# Patient Record
Sex: FEMALE | Race: WHITE | Employment: OTHER | URBAN - METROPOLITAN AREA
[De-identification: names, ages, dates, MRNs, and addresses within clinical notes are randomized per-mention and may not be internally consistent; named-entity substitution may affect disease eponyms.]

---

## 2022-12-13 ENCOUNTER — OFFICE VISIT (OUTPATIENT)
Dept: INTERNAL MEDICINE CLINIC | Age: 82
End: 2022-12-13
Payer: MEDICARE

## 2022-12-13 VITALS
TEMPERATURE: 97.3 F | RESPIRATION RATE: 24 BRPM | SYSTOLIC BLOOD PRESSURE: 120 MMHG | WEIGHT: 146.4 LBS | HEIGHT: 64 IN | DIASTOLIC BLOOD PRESSURE: 70 MMHG | OXYGEN SATURATION: 99 % | HEART RATE: 87 BPM | BODY MASS INDEX: 25 KG/M2

## 2022-12-13 DIAGNOSIS — E55.9 VITAMIN D DEFICIENCY: ICD-10-CM

## 2022-12-13 DIAGNOSIS — F32.A DEPRESSION, UNSPECIFIED DEPRESSION TYPE: ICD-10-CM

## 2022-12-13 DIAGNOSIS — M20.41 HAMMERTOES OF BOTH FEET: Primary | ICD-10-CM

## 2022-12-13 DIAGNOSIS — R61 EXCESSIVE SWEATING: ICD-10-CM

## 2022-12-13 DIAGNOSIS — M20.42 HAMMERTOES OF BOTH FEET: Primary | ICD-10-CM

## 2022-12-13 DIAGNOSIS — E78.00 HIGH CHOLESTEROL: ICD-10-CM

## 2022-12-13 PROCEDURE — 1123F ACP DISCUSS/DSCN MKR DOCD: CPT | Performed by: INTERNAL MEDICINE

## 2022-12-13 PROCEDURE — 99204 OFFICE O/P NEW MOD 45 MIN: CPT | Performed by: INTERNAL MEDICINE

## 2022-12-13 RX ORDER — ALPRAZOLAM 0.25 MG/1
0.25 TABLET ORAL EVERY OTHER DAY
COMMUNITY

## 2022-12-13 RX ORDER — ATORVASTATIN CALCIUM 20 MG/1
20 TABLET, FILM COATED ORAL DAILY
COMMUNITY

## 2022-12-13 RX ORDER — BUSPIRONE HYDROCHLORIDE 30 MG/1
30 TABLET ORAL 2 TIMES DAILY
COMMUNITY

## 2022-12-13 SDOH — ECONOMIC STABILITY: FOOD INSECURITY: WITHIN THE PAST 12 MONTHS, THE FOOD YOU BOUGHT JUST DIDN'T LAST AND YOU DIDN'T HAVE MONEY TO GET MORE.: NEVER TRUE

## 2022-12-13 SDOH — ECONOMIC STABILITY: FOOD INSECURITY: WITHIN THE PAST 12 MONTHS, YOU WORRIED THAT YOUR FOOD WOULD RUN OUT BEFORE YOU GOT MONEY TO BUY MORE.: NEVER TRUE

## 2022-12-13 ASSESSMENT — PATIENT HEALTH QUESTIONNAIRE - PHQ9
SUM OF ALL RESPONSES TO PHQ QUESTIONS 1-9: 0
2. FEELING DOWN, DEPRESSED OR HOPELESS: 0
1. LITTLE INTEREST OR PLEASURE IN DOING THINGS: 0
SUM OF ALL RESPONSES TO PHQ QUESTIONS 1-9: 0
SUM OF ALL RESPONSES TO PHQ QUESTIONS 1-9: 0
SUM OF ALL RESPONSES TO PHQ9 QUESTIONS 1 & 2: 0
SUM OF ALL RESPONSES TO PHQ QUESTIONS 1-9: 0

## 2022-12-13 ASSESSMENT — ANXIETY QUESTIONNAIRES
7. FEELING AFRAID AS IF SOMETHING AWFUL MIGHT HAPPEN: 0
2. NOT BEING ABLE TO STOP OR CONTROL WORRYING: 0
3. WORRYING TOO MUCH ABOUT DIFFERENT THINGS: 0
IF YOU CHECKED OFF ANY PROBLEMS ON THIS QUESTIONNAIRE, HOW DIFFICULT HAVE THESE PROBLEMS MADE IT FOR YOU TO DO YOUR WORK, TAKE CARE OF THINGS AT HOME, OR GET ALONG WITH OTHER PEOPLE: SOMEWHAT DIFFICULT
4. TROUBLE RELAXING: 0
GAD7 TOTAL SCORE: 0
1. FEELING NERVOUS, ANXIOUS, OR ON EDGE: 0
6. BECOMING EASILY ANNOYED OR IRRITABLE: 0
5. BEING SO RESTLESS THAT IT IS HARD TO SIT STILL: 0

## 2022-12-13 ASSESSMENT — SOCIAL DETERMINANTS OF HEALTH (SDOH): HOW HARD IS IT FOR YOU TO PAY FOR THE VERY BASICS LIKE FOOD, HOUSING, MEDICAL CARE, AND HEATING?: NOT HARD AT ALL

## 2022-12-13 NOTE — PROGRESS NOTES
Noemi Garcia is a 80 y.o. female who presents for   Chief Complaint   Patient presents with    Established New Doctor    Health Maintenance     Covid, dep, shingles, tdap, pneumo, flu, awv    Insect Bite     Patient currently have bed bug bites over her body    and follow up of chronic medical problems. Patient Active Problem List   Diagnosis    Lesion of liver    Weight loss     HPI  Here to reestablish as a new patient and recently moved from a different place and patient wants to check her feet and also complains of excessive sweating    Current Outpatient Medications   Medication Sig Dispense Refill    DULoxetine HCl 60 MG CSDR Take by mouth in the morning and at bedtime Taking 2 tabs at night      atorvastatin (LIPITOR) 20 MG tablet Take 20 mg by mouth daily      busPIRone (BUSPAR) 30 MG tablet Take 30 mg by mouth 2 times daily      ALPRAZolam (XANAX) 0.25 MG tablet Take 0.25 mg by mouth every other day. No current facility-administered medications for this visit.        No Known Allergies    Past Medical History:   Diagnosis Date    Anxiety and depression     Depression     GERD (gastroesophageal reflux disease)     Weight loss        Past Surgical History:   Procedure Laterality Date    COCCYX REMOVAL  1950's    COLONOSCOPY  2011    COLONOSCOPY  4/25/14    ENDOSCOPY, COLON, DIAGNOSTIC      EYE SURGERY      injury to eye    SPINE SURGERY      tailbone removed as teenager    TONSILLECTOMY      UPPER GASTROINTESTINAL ENDOSCOPY  4/25/14       Family History   Problem Relation Age of Onset    Stroke Father      ROS  Constitutional:  Negative for fatigue, loss of appetite and unexpected weight change  HEENT            : Negative for neck stiffness and pain, no congestion or sinus pressure  Eyes                : No visual disturbance or pain  Cardiovascular: No chest pain or palpitations or leg swelling  Respiratory      : Negative for cough, shortness of breath or wheezing  Gastrointestinal: Negative for abdominal pain, constipation or diarrhea and bloating No nausea or vomiting  Genitourinary:     No urgency or frequency, no burning or hematuria  Musculoskeletal: No arthralgias, back pain or myalgias  Skin                  : Negative for rash or erythema  Neurological    : Negative for dizziness, weakness, tremors ,light headedness or syncope  Psychiatric       : Negative for dysphoric mood, sleep disturbances, nervous or anxious, or decreased concentration  All other review of systems was negative    Objective  Physical Examination:    Nursing note reviewed    /70 (Site: Right Upper Arm, Position: Sitting, Cuff Size: Medium Adult)   Pulse 87   Temp 97.3 °F (36.3 °C) (Temporal)   Resp 24   Ht 5' 4\" (1.626 m)   Wt 146 lb 6.4 oz (66.4 kg)   SpO2 99%   BMI 25.13 kg/m²   BP Readings from Last 3 Encounters:   12/13/22 120/70   11/11/15 158/72   09/14/15 148/66         Constitutional:  Jenny Rodriguez is oriented to place, person and time ,appears well-developed and well-nourished  HEENT:  Atraumatic and normocephalic, external ears normal bilaterally, nose normal no oropharyngeal exudate and is clear and moist  Eyes:  EOCM normal; conjunctivae normal; PERRLA bilaterally  Neck:  Normal range of motion, neck supple, no JVD and no thyromegaly  Cardiovascular:  RRR, normal heart sounds and intact distal pulses  Pulmonary:  effort normal and breath sounds normal bilaterally,no wheezes or rales, no respiratory distress  Abdominal:  Soft, non-tender; normal bowel sounds, no masses  Musculoskeletal:  Normal range of motion and no edema or tenderness bilaterally  No lymphadenopathy  Neurological:  alert, oriented, and normal reflexes bilaterally  Skin: warm and dry  Psychiatric:  normal mood and effect; behavior normal.    Labs:   No results found for: LABA1C  Lab Results   Component Value Date    CHOL 233 (H) 07/09/2015     Lab Results   Component Value Date    HDL 69 07/09/2015     No results found for: [facility]  Lab Results   Component Value Date    TRIG 91 07/09/2015     No results found for: [place], [place]  Lab Results   Component Value Date    WBC 8.5 09/12/2015    HGB 13.2 09/12/2015    HCT 39.6 09/12/2015    MCV 93.6 09/12/2015     09/12/2015     Lab Results   Component Value Date    INR 1.0 09/12/2015    PROTIME 10.3 09/12/2015     Lab Results   Component Value Date    GLUCOSE 104 (H) 09/12/2015    CREATININE 0.45 (L) 09/12/2015    BUN 15 09/12/2015     09/12/2015    K 3.9 09/12/2015    CL 99 09/12/2015    CO2 27 09/12/2015     Lab Results   Component Value Date    ALT 12 09/12/2015    AST 15 09/12/2015    ALKPHOS 76 09/12/2015    BILITOT 0.21 (L) 09/12/2015     Lab Results   Component Value Date    LABALBU 4.3 09/12/2015     Lab Results   Component Value Date    TSH 2.76 07/09/2015     Assessment:  1. Hammertoes of both feet    2. Excessive sweating    3. Depression, unspecified depression type    4. Vitamin D deficiency    5. High cholesterol        Plan:  Patient had severe hammertoes and more distorted because and patient states that she has seen 2 different podiatrist did not help her and so patient is referred to a foot ankle specialist to evaluate and possible intervention  As patient had bipolar/depression and had difficulty ambulating patient wants TARP paperwork to be completed and was done  Patient has no recent lab work and patient has not seen her doctor for a more than a year and also complaining of excessive sweating and requesting a thyroid check and she will complete lab work ordered  Patient asking about Xanax and advised her that I do not give any prescriptions for Xanax and patient verbalized understanding and I explained to her that she can see psychiatrist or a different PCP  Review in 6 months           1. Ana Luisa received counseling on the following healthy behaviors: nutrition and exercise    2. Prior labs and health maintenance reviewed.      3.  Discussed use, benefit, and side effects of prescribed medications. Barriers to medication compliance addressed. All her questions were answered. Pt voiced understanding. Giovanni Castro will continue current medications, diet and exercise. No orders of the defined types were placed in this encounter. Completed Refills               Requested Prescriptions      No prescriptions requested or ordered in this encounter     4. Patient given educational materials - see patient instructions    5. Was a self-tracking handout given in paper form or via [facility]? NO    Orders Placed This Encounter   Procedures    Comprehensive Metabolic Panel     Standing Status:   Future     Standing Expiration Date:   12/13/2023    Lipid Panel     Standing Status:   Future     Standing Expiration Date:   12/13/2023     Order Specific Question:   Is Patient Fasting?/# of Hours     Answer:   12    TSH     Standing Status:   Future     Standing Expiration Date:   12/13/2023    Vitamin B12     Standing Status:   Future     Standing Expiration Date:   12/13/2023    CBC     Standing Status:   Future     Standing Expiration Date:   12/13/2023    Magnesium     Standing Status:   Future     Standing Expiration Date:   12/13/2023    Vitamin D 25 Hydroxy     Standing Status:   Future     Standing Expiration Date:   12/13/2023    Maegan Curry MD, Orthopedic Surgery (foot & ankle), [place]     Referral Priority:   Routine     Referral Type:   Eval and Treat     Referral Reason:   Specialty Services Required     Referred to Provider:   Aster Johns MD     Requested Specialty:   Orthopedic Surgery     Number of Visits Requested:   1     Return in about 6 months (around 6/13/2023). Patient voiced understanding and agreed to treatment plan.      Electronically signed by Rebeca Vera MD on 12/13/2022 at 12:26 PM    This note is created with a voice recognition program and while intend to generate a document that accurately reflects the content of the visit, no guarantee can be provided that every mistake has been identified and corrected by editing.

## 2022-12-19 DIAGNOSIS — M79.671 BILATERAL FOOT PAIN: Primary | ICD-10-CM

## 2022-12-19 DIAGNOSIS — M79.672 BILATERAL FOOT PAIN: Primary | ICD-10-CM

## 2022-12-20 ENCOUNTER — HOSPITAL ENCOUNTER (OUTPATIENT)
Dept: PHYSICAL THERAPY | Facility: CLINIC | Age: 82
Setting detail: THERAPIES SERIES
Discharge: HOME | End: 2022-12-20
Payer: MEDICARE

## 2022-12-20 ENCOUNTER — OFFICE VISIT (OUTPATIENT)
Dept: ORTHOPEDIC SURGERY | Age: 82
End: 2022-12-20
Payer: MEDICARE

## 2022-12-20 VITALS — RESPIRATION RATE: 16 BRPM | BODY MASS INDEX: 24.92 KG/M2 | OXYGEN SATURATION: 100 % | HEIGHT: 64 IN | WEIGHT: 146 LBS

## 2022-12-20 DIAGNOSIS — M20.40 HAMMER TOE, ACQUIRED: ICD-10-CM

## 2022-12-20 DIAGNOSIS — M21.862 GASTROCNEMIUS EQUINUS OF LEFT LOWER EXTREMITY: ICD-10-CM

## 2022-12-20 DIAGNOSIS — M20.11 VALGUS DEFORMITY OF BOTH GREAT TOES: Primary | ICD-10-CM

## 2022-12-20 DIAGNOSIS — M20.12 VALGUS DEFORMITY OF BOTH GREAT TOES: Primary | ICD-10-CM

## 2022-12-20 PROCEDURE — 97161 PT EVAL LOW COMPLEX 20 MIN: CPT

## 2022-12-20 PROCEDURE — 99204 OFFICE O/P NEW MOD 45 MIN: CPT | Performed by: ORTHOPAEDIC SURGERY

## 2022-12-20 PROCEDURE — 1123F ACP DISCUSS/DSCN MKR DOCD: CPT | Performed by: ORTHOPAEDIC SURGERY

## 2022-12-20 NOTE — LETTER
[address] [place] [address] and Sports Medicine  [place] [address]  Phone: [phone]  Fax: [phone]    Albino Sesay MD    December 20, 2022     Carolyn Mcghee MD  [place] [address] [address] [facility] [address]    Patient: Salvador Palm   MR Number: [id number]   YOB: 1940   Date of Visit: 12/20/2022       Dear Carolyn Mcghee:    Thank you for referring Salvador Palm to me for evaluation/treatment. Below are the relevant portions of my assessment and plan of care. She has left greater than right foot pain, secondary to bilateral hallux valgus with bilateral second hammertoes, with underlying gastroc equinus contractures. Notably, she has the past medical history as above. We had a discussion today about the likely diagnosis and its natural history, physical exam and imaging findings, as well as various treatment options in detail. Surgically, we discussed a left first MTP joint fusion with bone grafting, second hammertoe surgical correction with PIP fusion, and gastroc recession. We discussed the expected postoperative course, including the relevant weightbearing restrictions/immobilization. Prior to her initial office visit, she reports that she has tried toe sleeves and splints, but reports that her pain has gotten significantly worse with time, and she is having a difficult time walking through the grocery store. At today's visit, after a thorough discussion of surgical nonsurgical treatment options, she wishes to proceed with surgery in the near future. Orders/referrals were placed as below at today's visit. The patient has been ordered DME (crutches/walker/rolling knee scooter), which are medically necessary items postoperatively (to be used in the home to maintain daily living activities such as going to the kitchen, bedroom, and bathroom).  I also ordered physical therapy for the patient to help reinforce/teach the relevant weight bearing precautions, to help allow for safe transfers and early mobilization, as well as effectively utilize DME. Surgical booking paperwork was completed and submitted. All questions were answered and the above plan was agreed upon. The patient will return to clinic for a surgical discussion (left foot first MTP joint fusion, second hammertoe surgical correction with possible PIP joint fusion, possible use of bone allograft/autograft and/or bone substitute, and gastroc recession). If you have questions, please do not hesitate to call me. I look forward to following Ana Luisa along with you.     Sincerely,      Will Rodriguez MD

## 2022-12-20 NOTE — CONSULTS
THE [facility] &  Therapy  [facility] [address]   [place]: [phone]  F: [phone]           Physical Therapy Evaluation    Date:  2022   Patient: Evelyn Wu  : 1940  MRN: [id number]  Physician: Dr Yonatan Escobedo: [employer] (VERIFICATION PENDING)  Medical Diagnosis: Bilat valgus deformity toes  Rehab Codes: M20.11, M20.12  Onset date:    Next Dr's appt. : --      Subjective:   CC/HPI: The patient is an 80year old female who has bilat foot pain that began in  atraumatically. Left foot surgery planned for 23. She does not walk very much, up to half a block to ride the bus. She uses a straight cane for gait. Per MD note on 22    Surgically, we discussed a left first MTP joint fusion with bone grafting, second hammertoe surgical correction with PIP fusion, and gastroc recession. Given the severity of her deformity, rigidity, and her age/activity level, I do not believe that a bunionectomy surgery would serve the patient as well as a first MTP joint fusion. We discussed the expected postoperative course, including the relevant weightbearing restrictions/immobilization. Scheduled DOS 23    PMHx: Past Medical History:    She   Past Medical History in prose (no negatives)    has a past medical history of Anxiety and depression, Depression, GERD (gastroesophageal reflux disease), and Weight loss. Past Surgical History:    She  has a past surgical history that includes Spine surgery; Tonsillectomy; Colonoscopy (); eye surgery; Upper gastrointestinal endoscopy (14); Colonoscopy (14); Endoscopy, colon, diagnostic; and Coccyx removal (s).          Medications:  [x] Refer to full medical record [] None [] Other:  Allergies:       [x] Refer to full medical record [] None [] Other:        Martial Status Lives alone, have two sons in town    Home type Apartment    Stairs from outside 1   Stairs inside 5th floor, uses elevator       Pain present? yes   Location LLE 2nd toe foot    Pain Rating currently 8/10             Objective:    ROM/Strength: WNLS        Assessment:  STG: (to be met in 1 treatments)  Educate patient on proper use of DME   Patient to perform transfers and weight bearing status independent without assistance         Rehab Potential:  [x] Good  [] Fair  [] Poor   Suggested Professional Referral:  [x] No  [] Yes:  Barriers to Goal Achievement[de-identified]  [x] No  [] Yes:  Domestic Concerns:  [x] No  [] Yes:    Pt. Education:  [x] Plans/Goals, Risks/Benefits discussed  [x] Home exercise program    Method of Education: [x] Verbal  [x] Demo  [] Written  Comprehension of Education:  [x] Verbalizes understanding. [x] Demonstrates understanding. Treatment Plan:  [] Therapeutic Exercise      [] Manual Therapy       [] Instruction in HEP        [] Neuromuscular Re-education     [] Vasocompression Dk )        [x] Gait Training                      []  Medication allergies reviewed for use of    Dexamethasone Sodium Phosphate 4mg/ml     with iontophoresis treatments. Pt is not allergic. Frequency: 1 visit      Todays Treatment:      Educated pt on use of DME, including: (Check box of device used)     [x] Knee Scooter: Instructed pt on appropriate height being even with contralateral knee. Reviewed safety concerns such as not gliding on turns and using breaks appropriately. [x] Rolling Walker: Instructed pt on appropriate height of even with wrists with arms at resting at side. Educated pt on safe gait pattern while using walker. Explained to pt the difference between a 4 wheeled walker and rolling walker and how a rolling walker is most appropriate for a NWB pt. Comments: Pt with good understanding of all techniques/uses and expressed no safety concerns.  At this time pt will most benefit from use of knee scooter and rolling walker          Evaluation Complexity:  History (Personal factors, comorbidities) [] 0 [] 1-2 [] 3+   Exam (limitations, restrictions) [] 1-2 [] 3 [] 4+   Clinical presentation (progression) [] Stable [] Evolving  [] Unstable   Decision Making [] Low [] Moderate [] High    [x] Low Complexity [] Moderate Complexity [] High Complexity       The patient has been evaluated by Physical Therapy:     [x] He/She was able to demonstrate compliance with the relevant weight bearing restriction, and safe discharge to home is anticipated after surgery. []  He/She was unable to demonstrate compliance with the relevant weight bearing restriction, and further sessions with PT are unlikely to help this; discharge to a SNF is anticipated after surgery. The following pieces of DME are mandatory for safe discharge to home:    [x] rolling walker (2-wheel)    [] crutches   [x] rolling knee scooter      [] wheelchair    [] other: ________________    The following pieces of DME are recommended as helpful but are optional:    [] rolling walker (2-wheel)   [] crutches    [] rolling knee scooter      [] wheelchair     [] other: ________________        Treatment Charges: Mins Units   [x] Evaluation       [x]  Low       []  Moderate       []  High 20 1   []  Modalities     []  Ther Exercise     []  Manual Therapy     []  Ther Activities     []  Aquatics     []  Vasocompression     []  Gait       TOTAL TREATMENT TIME: 20    Time in:1000   Time Out:1020    Electronically signed by: Jessica Salinas PT        Physician Signature:________________________________Date:__________________  By signing above or cosigning this note, I have reviewed this plan of care and certify a need for medically necessary rehabilitation services.      *PLEASE SIGN ABOVE AND FAX BACK ALL PAGES*

## 2022-12-20 NOTE — PROGRESS NOTES
[address] [place] [address] AND SPORTS MEDICINE  [facility]  [address] [place] [address]  Dept: [phone]    Ambulatory Orthopedic Consult      CHIEF COMPLAINT:    Chief Complaint   Patient presents with    Foot Pain     Bilateral        HISTORY OF PRESENT ILLNESS:      The patient is a 80 y.o. female who is being seen for evaluation of the above, which began in 2020 atraumatically  . At today's visit, she is using no brace/assistive device. History is obtained today from:   [x]  the patient     [x]  EMR     []  one family member/friend    []  multiple family members/friends    []  other: At today's visit, she localizes her pain to her left greater than right bilateral forefoot. REVIEW OF SYSTEMS:  Musculoskeletal: See HPI for pertinent positives     Past Medical History:    She  has a past medical history of Anxiety and depression, Depression, GERD (gastroesophageal reflux disease), and Weight loss. Past Surgical History:    She  has a past surgical history that includes Spine surgery; Tonsillectomy; Colonoscopy (2011); eye surgery; Upper gastrointestinal endoscopy (4/25/14); Colonoscopy (4/25/14); Endoscopy, colon, diagnostic; and Coccyx removal (1950's). Current Medications:     Current Outpatient Medications:     DULoxetine HCl 60 MG CSDR, Take by mouth in the morning and at bedtime Taking 2 tabs at night, Disp: , Rfl:     atorvastatin (LIPITOR) 20 MG tablet, Take 20 mg by mouth daily, Disp: , Rfl:     busPIRone (BUSPAR) 30 MG tablet, Take 30 mg by mouth 2 times daily, Disp: , Rfl:     ALPRAZolam (XANAX) 0.25 MG tablet, Take 0.25 mg by mouth every other day., Disp: , Rfl:      Allergies:    Patient has no known allergies. Family History:  family history includes Stroke in her father.     Social History:   Social History     Occupational History     Employer: [employer]   Tobacco Use    Smoking status: Never Smokeless tobacco: Never   Substance and Sexual Activity    Alcohol use: No     Comment: very little    Drug use: No    Sexual activity: Not on file     Retired    OBJECTIVE:  Resp 16   Ht 5' 4\" (1.626 m)   Wt 146 lb (66.2 kg)   SpO2 100%   BMI 25.06 kg/m²    Psych: awake, alert  Cardio:  well perfused extremities, no cyanosis  Resp:  normal respiratory effort  Musculoskeletal:    RLE:  Vascular: Limb well perfused, compartments soft/compressible. Skin: No erythema/ulcers. Intact. Neurovascular Status:  Grossly neurovascularly intact throughout   Tenderness to Palpation: First MTP joint, second toe  -Hallux valgus (rigid) with equinus  -Second hammertoe, rigid      LLE:  Vascular: Limb well perfused, compartments soft/compressible. Skin: No erythema/ulcers. Intact. Neurovascular Status:  Grossly neurovascularly intact throughout   Tenderness to Palpation: First MTP joint, second toe  -Hallux valgus (rigid) with equinus  -Second hammertoe, rigid      RADIOLOGY:   12/20/2022 FINDINGS:  Three weightbearing views (AP, Mortise, and Lateral) of the bilateral ankle and three weightbearing views (AP, Oblique, Lateral) of the bilateral foot were obtained in the office today and reviewed, revealing no acute fracture, dislocation, or radioopaque foreign body/tumor. Severe hallux valgus noted bilaterally. Hammertoes noted at the bilateral second toes. IMPRESSION:  No acute fracture/dislocation. Hallux valgus as above. Electronically signed by Alexander Barrientos MD    Relevant previous imaging reviewed, both imaging and report(s) as below:    No results found. ASSESSMENT AND PLAN:  Body mass index is 25.06 kg/m². She has left greater than right foot pain, secondary to bilateral hallux valgus with bilateral second hammertoes, with underlying gastroc equinus contractures. Notably, she has the past medical history as above.      We had a discussion today about the likely diagnosis and its natural history, physical exam and imaging findings, as well as various treatment options in detail. Surgically, we discussed a left first MTP joint fusion with bone grafting, second hammertoe surgical correction with PIP fusion, and gastroc recession. Given the severity of her deformity, rigidity, and her age/activity level, I do not believe that a bunionectomy surgery would serve the patient as well as a first MTP joint fusion. We discussed the expected postoperative course, including the relevant weightbearing restrictions/immobilization. Prior to her initial office visit, she reports that she has tried toe sleeves and splints, but reports that her pain has gotten significantly worse with time, and she is having a difficult time walking through the grocery store. At today's visit, after a thorough discussion of surgical nonsurgical treatment options, she wishes to proceed with surgery in the near future. Orders/referrals were placed as below at today's visit. The patient has been ordered DME (crutches/walker/rolling knee scooter), which are medically necessary items postoperatively (to be used in the home to maintain daily living activities such as going to the kitchen, bedroom, and bathroom). I also ordered physical therapy for the patient to help reinforce/teach the relevant weight bearing precautions, to help allow for safe transfers and early mobilization, as well as effectively utilize DME. Surgical booking paperwork was completed and submitted. All questions were answered and the above plan was agreed upon. The patient will return to clinic for a surgical discussion (left foot first MTP joint fusion, second hammertoe surgical correction with possible PIP joint fusion, possible use of bone allograft/autograft and/or bone substitute, and gastroc recession).            At the patient's next visit, depending on how the patient is doing and/or new imaging/labs results, we may consider the following options:    []  Lace up ankle     []  CAM boot         []  removable wrist brace     []  PT:        []  Wean out immobilization         []  Adv activity      []  Footmind        []  Spenco       []  Custom Orthotic:               []  AZ brace                    []  Rocker Bottom      []  Night splint    []  Heel cups        []  Strap        []  Toe gizmos    []  Topl        []  NSAIDs         []  Lakeshia        []  Ref:         []  Stress Xray    []  CT        []  MRI  []  Inj:          []  Consider OR      []  Pick OR date    No follow-ups on file. No orders of the defined types were placed in this encounter. No orders of the defined types were placed in this encounter. Jabier Vargas MD  Orthopedic Surgery        Please excuse any typos/errors, as this note was created with the assistance of voice recognition software. While intending to generate a document that actually reflects the content of the visit, the document can still have some errors including those of syntax and sound-a-like substitutions which may escape proof reading. In such instances, actual meaning can be extrapolated by context.

## 2022-12-22 ENCOUNTER — HOSPITAL ENCOUNTER (OUTPATIENT)
Age: 82
Discharge: HOME | End: 2022-12-22
Payer: MEDICARE

## 2022-12-22 ENCOUNTER — TELEPHONE (OUTPATIENT)
Dept: ORTHOPEDIC SURGERY | Age: 82
End: 2022-12-22

## 2022-12-22 DIAGNOSIS — M20.41 HAMMERTOES OF BOTH FEET: ICD-10-CM

## 2022-12-22 DIAGNOSIS — E55.9 VITAMIN D DEFICIENCY: ICD-10-CM

## 2022-12-22 DIAGNOSIS — F32.A DEPRESSION, UNSPECIFIED DEPRESSION TYPE: ICD-10-CM

## 2022-12-22 DIAGNOSIS — M20.42 HAMMERTOES OF BOTH FEET: ICD-10-CM

## 2022-12-22 DIAGNOSIS — R61 EXCESSIVE SWEATING: ICD-10-CM

## 2022-12-22 DIAGNOSIS — E78.00 HIGH CHOLESTEROL: ICD-10-CM

## 2022-12-22 LAB
ALBUMIN SERPL-MCNC: 4.3 G/DL (ref 3.5–5.2)
ALBUMIN/GLOBULIN RATIO: 1.7 (ref 1–2.5)
ALP BLD-CCNC: 98 U/L (ref 35–104)
ALT SERPL-CCNC: 19 U/L (ref 5–33)
ANION GAP SERPL CALCULATED.3IONS-SCNC: 13 MMOL/L (ref 9–17)
AST SERPL-CCNC: 21 U/L
BILIRUB SERPL-MCNC: 0.3 MG/DL (ref 0.3–1.2)
BUN BLDV-MCNC: 12 MG/DL (ref 8–23)
CALCIUM SERPL-MCNC: 9.6 MG/DL (ref 8.6–10.4)
CHLORIDE BLD-SCNC: 105 MMOL/L (ref 98–107)
CHOLESTEROL/HDL RATIO: 2.6
CHOLESTEROL: 150 MG/DL
CO2: 25 MMOL/L (ref 20–31)
CREAT SERPL-MCNC: 0.6 MG/DL (ref 0.5–0.9)
GFR SERPL CREATININE-BSD FRML MDRD: >60 ML/MIN/1.73M2
GLUCOSE BLD-MCNC: 102 MG/DL (ref 70–99)
HCT VFR BLD CALC: 39 % (ref 36.3–47.1)
HDLC SERPL-MCNC: 57 MG/DL
HEMOGLOBIN: 12.7 G/DL (ref 11.9–15.1)
LDL CHOLESTEROL: 80 MG/DL (ref 0–130)
MAGNESIUM: 2.3 MG/DL (ref 1.6–2.6)
MCH RBC QN AUTO: 31.1 PG (ref 25.2–33.5)
MCHC RBC AUTO-ENTMCNC: 32.6 G/DL (ref 28.4–34.8)
MCV RBC AUTO: 95.6 FL (ref 82.6–102.9)
NRBC AUTOMATED: 0 PER 100 WBC
PDW BLD-RTO: 13.1 % (ref 11.8–14.4)
PLATELET # BLD: 303 K/UL (ref 138–453)
PMV BLD AUTO: 9.5 FL (ref 8.1–13.5)
POTASSIUM SERPL-SCNC: 4.3 MMOL/L (ref 3.7–5.3)
RBC # BLD: 4.08 M/UL (ref 3.95–5.11)
SODIUM BLD-SCNC: 143 MMOL/L (ref 135–144)
TOTAL PROTEIN: 6.9 G/DL (ref 6.4–8.3)
TRIGL SERPL-MCNC: 64 MG/DL
TSH SERPL DL<=0.05 MIU/L-ACNC: 2.44 UIU/ML (ref 0.3–5)
VITAMIN B-12: 560 PG/ML (ref 232–1245)
VITAMIN D 25-HYDROXY: 30.3 NG/ML
WBC # BLD: 6.6 K/UL (ref 3.5–11.3)

## 2022-12-22 PROCEDURE — 82607 VITAMIN B-12: CPT

## 2022-12-22 PROCEDURE — 85027 COMPLETE CBC AUTOMATED: CPT

## 2022-12-22 PROCEDURE — 80053 COMPREHEN METABOLIC PANEL: CPT

## 2022-12-22 PROCEDURE — 82306 VITAMIN D 25 HYDROXY: CPT

## 2022-12-22 PROCEDURE — 83735 ASSAY OF MAGNESIUM: CPT

## 2022-12-22 PROCEDURE — 84443 ASSAY THYROID STIM HORMONE: CPT

## 2022-12-22 PROCEDURE — 36415 COLL VENOUS BLD VENIPUNCTURE: CPT

## 2022-12-22 PROCEDURE — 80061 LIPID PANEL: CPT

## 2022-12-22 NOTE — TELEPHONE ENCOUNTER
Patient had called in and left voicemail regarding putting off surgery for now. She lives alone and is very concerned about being able to take care of herself after surgery. I spoke with Dr. Anton Padilla who wanted patient to know that arrangements can be made to make things easier for her after surgery. I called patient and advised but she is quite adamant that \"this season (winter)\" is not the time to do this. Patient said she definitely call back to schedule surgery with Dr. Anton Padilla.

## 2022-12-27 ENCOUNTER — TELEPHONE (OUTPATIENT)
Dept: INTERNAL MEDICINE CLINIC | Age: 82
End: 2022-12-27

## 2022-12-27 NOTE — TELEPHONE ENCOUNTER
----- Message from [address] sent at 12/22/2022  4:26 PM EST -----  Subject: Message to Provider    QUESTIONS  Information for Provider? Valentino Carolina would like to know what the status is on   the [employer] application. Was this approved? Please call and advise. Thank   you   ---------------------------------------------------------------------------  --------------  Garima Quinn INFO  [phone]; OK to leave message on voicemail  ---------------------------------------------------------------------------  --------------  SCRIPT ANSWERS  Relationship to Patient?  Self

## 2022-12-28 ENCOUNTER — TELEPHONE (OUTPATIENT)
Dept: INTERNAL MEDICINE CLINIC | Age: 82
End: 2022-12-28

## 2022-12-29 ENCOUNTER — TELEPHONE (OUTPATIENT)
Dept: INTERNAL MEDICINE CLINIC | Age: 82
End: 2022-12-29

## 2022-12-29 NOTE — TELEPHONE ENCOUNTER
----- Message from Prince Munson sent at 12/28/2022  4:14 PM EST -----  Subject: Message to Provider    QUESTIONS  Information for Provider? Patient called to get her lab results mailed out   to her. Patient stated she is use to getting them in the mail. Verified   address in the system which is correct. TY  ---------------------------------------------------------------------------  --------------  Michelle Abbasi [facility]  [phone]; OK to leave message on voicemail  ---------------------------------------------------------------------------  --------------  SCRIPT ANSWERS  Relationship to Patient?  Self

## 2023-01-04 ENCOUNTER — HOSPITAL ENCOUNTER (OUTPATIENT)
Dept: MAMMOGRAPHY | Age: 83
Discharge: HOME | End: 2023-01-06
Payer: MEDICARE

## 2023-01-04 DIAGNOSIS — Z12.31 ENCOUNTER FOR SCREENING MAMMOGRAM FOR BREAST CANCER: ICD-10-CM

## 2023-01-04 PROCEDURE — 77067 SCR MAMMO BI INCL CAD: CPT

## 2023-01-11 ENCOUNTER — TELEPHONE (OUTPATIENT)
Dept: INTERNAL MEDICINE CLINIC | Age: 83
End: 2023-01-11

## 2023-01-11 DIAGNOSIS — L84 CALLUS OF FOOT: Primary | ICD-10-CM

## 2023-01-11 RX ORDER — ATORVASTATIN CALCIUM 20 MG/1
20 TABLET, FILM COATED ORAL DAILY
Qty: 90 TABLET | Refills: 0 | Status: SHIPPED | OUTPATIENT
Start: 2023-01-11

## 2023-01-11 RX ORDER — BUSPIRONE HYDROCHLORIDE 30 MG/1
30 TABLET ORAL 2 TIMES DAILY
Qty: 180 TABLET | Refills: 0 | Status: SHIPPED | OUTPATIENT
Start: 2023-01-11

## 2023-01-11 NOTE — TELEPHONE ENCOUNTER
Genny Stone is calling to request a refill on the following medication(s):    Medication Request:  Requested Prescriptions     Pending Prescriptions Disp Refills    busPIRone (BUSPAR) 30 MG tablet 180 tablet 0     Sig: Take 30 mg by mouth 2 times daily    DULoxetine HCl 60 MG CSDR 180 capsule 0     Sig: Take 60 mg by mouth in the morning and at bedtime Taking 2 tabs at night    atorvastatin (LIPITOR) 20 MG tablet 90 tablet 0     Sig: Take 1 tablet by mouth daily       Last Visit Date (If Applicable):  92/49/3801    Next Visit Date:    Visit date not found

## 2023-01-11 NOTE — TELEPHONE ENCOUNTER
Discussed issues of TIA in the past at Wellness exam.  Patient is not interested in assessment at that point.  If symptoms have resolved suggest observation and follow-up as scheduled.  Continuing with aspirin therapy and if  symptoms recur or worsen should be seen ER   Patient asking for a referral to see podiatry for the calluses on her feet?     Please advise

## 2023-02-02 ENCOUNTER — OFFICE VISIT (OUTPATIENT)
Dept: FAMILY MEDICINE CLINIC | Age: 83
End: 2023-02-02
Payer: MEDICARE

## 2023-02-02 VITALS
WEIGHT: 146.4 LBS | TEMPERATURE: 96.4 F | SYSTOLIC BLOOD PRESSURE: 102 MMHG | OXYGEN SATURATION: 97 % | HEART RATE: 112 BPM | DIASTOLIC BLOOD PRESSURE: 60 MMHG | BODY MASS INDEX: 25.13 KG/M2

## 2023-02-02 DIAGNOSIS — E78.00 HYPERCHOLESTEROLEMIA: ICD-10-CM

## 2023-02-02 DIAGNOSIS — F41.1 GAD (GENERALIZED ANXIETY DISORDER): Primary | ICD-10-CM

## 2023-02-02 PROCEDURE — 1123F ACP DISCUSS/DSCN MKR DOCD: CPT | Performed by: INTERNAL MEDICINE

## 2023-02-02 PROCEDURE — 99214 OFFICE O/P EST MOD 30 MIN: CPT | Performed by: INTERNAL MEDICINE

## 2023-02-02 RX ORDER — ALPRAZOLAM 0.5 MG/1
0.25 TABLET ORAL DAILY PRN
Qty: 15 TABLET | Refills: 0 | Status: SHIPPED | OUTPATIENT
Start: 2023-02-02 | End: 2023-03-04

## 2023-02-02 RX ORDER — HYDROXYZINE HYDROCHLORIDE 10 MG/1
TABLET, FILM COATED ORAL
COMMUNITY
Start: 2023-01-18

## 2023-02-02 SDOH — ECONOMIC STABILITY: FOOD INSECURITY: WITHIN THE PAST 12 MONTHS, YOU WORRIED THAT YOUR FOOD WOULD RUN OUT BEFORE YOU GOT MONEY TO BUY MORE.: NEVER TRUE

## 2023-02-02 SDOH — ECONOMIC STABILITY: HOUSING INSECURITY
IN THE LAST 12 MONTHS, WAS THERE A TIME WHEN YOU DID NOT HAVE A STEADY PLACE TO SLEEP OR SLEPT IN A SHELTER (INCLUDING NOW)?: YES

## 2023-02-02 SDOH — ECONOMIC STABILITY: FOOD INSECURITY: WITHIN THE PAST 12 MONTHS, THE FOOD YOU BOUGHT JUST DIDN'T LAST AND YOU DIDN'T HAVE MONEY TO GET MORE.: NEVER TRUE

## 2023-02-02 SDOH — ECONOMIC STABILITY: INCOME INSECURITY: HOW HARD IS IT FOR YOU TO PAY FOR THE VERY BASICS LIKE FOOD, HOUSING, MEDICAL CARE, AND HEATING?: NOT HARD AT ALL

## 2023-02-02 ASSESSMENT — PATIENT HEALTH QUESTIONNAIRE - PHQ9
7. TROUBLE CONCENTRATING ON THINGS, SUCH AS READING THE NEWSPAPER OR WATCHING TELEVISION: 0
8. MOVING OR SPEAKING SO SLOWLY THAT OTHER PEOPLE COULD HAVE NOTICED. OR THE OPPOSITE, BEING SO FIGETY OR RESTLESS THAT YOU HAVE BEEN MOVING AROUND A LOT MORE THAN USUAL: 0
SUM OF ALL RESPONSES TO PHQ QUESTIONS 1-9: 2
1. LITTLE INTEREST OR PLEASURE IN DOING THINGS: 1
4. FEELING TIRED OR HAVING LITTLE ENERGY: 0
SUM OF ALL RESPONSES TO PHQ QUESTIONS 1-9: 2
SUM OF ALL RESPONSES TO PHQ9 QUESTIONS 1 & 2: 2
5. POOR APPETITE OR OVEREATING: 0
2. FEELING DOWN, DEPRESSED OR HOPELESS: 1
3. TROUBLE FALLING OR STAYING ASLEEP: 0
10. IF YOU CHECKED OFF ANY PROBLEMS, HOW DIFFICULT HAVE THESE PROBLEMS MADE IT FOR YOU TO DO YOUR WORK, TAKE CARE OF THINGS AT HOME, OR GET ALONG WITH OTHER PEOPLE: 0
SUM OF ALL RESPONSES TO PHQ QUESTIONS 1-9: 2
9. THOUGHTS THAT YOU WOULD BE BETTER OFF DEAD, OR OF HURTING YOURSELF: 0
SUM OF ALL RESPONSES TO PHQ QUESTIONS 1-9: 2
6. FEELING BAD ABOUT YOURSELF - OR THAT YOU ARE A FAILURE OR HAVE LET YOURSELF OR YOUR FAMILY DOWN: 0

## 2023-02-02 NOTE — PROGRESS NOTES
Pt is here today as a new pt     Pt would like Xanax called in, states she takes one every other day, states it helps her with her nerves, does have an upcoming appt with a psychiatrist, has been seen at the [address] [place] [address]      Pt states she has a popping in her RT ear, no draining       T states she ad a flu shot at her apartments, also had her [address] will call with the dates     States everything is going okay, just needed a new PCP   She is new to the area from [facility]

## 2023-02-02 NOTE — PROGRESS NOTES
[facility]  [facility] [address]  [address] [facility] [address]  Dept: [phone]  Dept Fax: [phone]      Mera Cedeno is a 80 y.o. female who presents today for hermedical conditions/complaints as noted below. Mera Cedeno is c/o of [facility] Care        Assessment/Plan:     1. OUSMANE (generalized anxiety disorder)  -     ALPRAZolam (XANAX) 0.5 MG tablet; Take 0.5 tablets by mouth daily as needed for Anxiety for up to 30 days. Max Daily Amount: 0.25 mg, Disp-15 tablet, R-0Normal  2. Hypercholesterolemia  Continue current medication, diet and lifestyle management     Pt to continue counseling through [facility]  Will call for refills on duloxetine and buspar when needs them   Med agreement signed today      No follow-ups on file. HPI     Moved here from [facility]   OUSMANE - she has been on xanax 0.25 mg every other day or so for several years, would like to continue. She is attending therapy through [facility], currently getting duloxetine 60mg QAM and 120mg QHS, and buspar through [facility] as well. Hyperlipidemia-tolerating current regimen without myalgias, dyspepsia, jaundice. Mostly compliant with diet recommendations for low salt diet, tries to limit greasy/cheesy/fried foods, not very compliant with exercise recommendations.     Cardiovascular risk factors: advanced age (older than 54 for men, 72 for women), dyslipidemia, and sedentary lifestyle      Previous PCP - [facility]     BP Readings from Last 3 Encounters:   02/02/23 102/60   12/13/22 120/70   11/11/15 158/72              Past Medical History:   Diagnosis Date    Anxiety and depression     Depression     GERD (gastroesophageal reflux disease)     Weight loss       Past Surgical History:   Procedure Laterality Date    BREAST BIOPSY Right 2010    COCCYX REMOVAL  09/01/1950    COLONOSCOPY  01/01/2011    COLONOSCOPY  04/25/2014    ENDOSCOPY, COLON, DIAGNOSTIC      EYE SURGERY      injury to eye    SPINE SURGERY      tailbone removed as teenager    TONSILLECTOMY      UPPER GASTROINTESTINAL ENDOSCOPY  04/25/2014       Family History   Problem Relation Age of Onset    Stroke Father        Social History     Tobacco Use    Smoking status: Never    Smokeless tobacco: Never   Substance Use Topics    Alcohol use: No     Comment: very little        No Known Allergies  Prior to Visit Medications    Medication Sig Taking? Authorizing Provider   hydrOXYzine HCl (ATARAX) 10 MG tablet take 1 tablet by mouth three times a day if needed for anxiety Yes Historical Provider, MD   busPIRone (BUSPAR) 30 MG tablet Take 30 mg by mouth 2 times daily Yes Konrad Baumgarten V, MD   DULoxetine HCl 60 MG CSDR Take 60 mg by mouth in the morning and at bedtime Taking 2 tabs at night Yes Margarita Sandra MD   atorvastatin (LIPITOR) 20 MG tablet Take 1 tablet by mouth daily Yes Margarita Sandra MD   ALPRAZolam Mariza Foristell) 0.25 MG tablet Take 0.25 mg by mouth every other day. Patient not taking: Reported on 2/2/2023  Historical Provider, MD       Review of Systems     Review of Systems   Constitutional:  Negative for fatigue, fever and unexpected weight change. Respiratory:  Negative for cough, choking, chest tightness, shortness of breath and wheezing. Cardiovascular:  Negative for chest pain, palpitations and leg swelling. Gastrointestinal:  Negative for abdominal pain, anal bleeding, blood in stool, constipation, diarrhea, nausea and vomiting. Endocrine: Negative. Musculoskeletal:  Negative for joint swelling and myalgias. Skin: Negative. Neurological:  Negative for dizziness. Psychiatric/Behavioral:  Positive for agitation, decreased concentration, dysphoric mood and sleep disturbance. The patient is nervous/anxious. All other systems reviewed and are negative.     Objective     /60 (Site: Left Upper Arm, Position: Sitting, Cuff Size: Medium Adult)   Pulse (!) 112   Temp (!) 96.4 °F (35.8 °C)   Wt 146 lb 6.4 oz (66.4 kg)   SpO2 97%   BMI 25.13 kg/m²   Physical Exam  Vitals and nursing note reviewed. Constitutional:       General: She is not in acute distress. Appearance: She is well-developed. She is not ill-appearing. Eyes:      General: Lids are normal. Vision grossly intact. Cardiovascular:      Rate and Rhythm: Normal rate and regular rhythm. Heart sounds: Normal heart sounds, S1 normal and S2 normal. No murmur heard. No friction rub. No gallop. Pulmonary:      Effort: Pulmonary effort is normal. No respiratory distress. Breath sounds: Normal breath sounds. No wheezing. Abdominal:      General: Bowel sounds are normal.      Palpations: Abdomen is soft. There is no mass. Tenderness: There is no abdominal tenderness. There is no guarding. Musculoskeletal:         General: Normal range of motion. Skin:     General: Skin is warm and dry. Capillary Refill: Capillary refill takes less than 2 seconds. Neurological:      General: No focal deficit present. Mental Status: She is alert and oriented to person, place, and time. Psychiatric:         Mood and Affect: Mood is anxious. Data Review     Controlled Substance Monitoring:    Acute and Chronic Pain Monitoring:   RX Monitoring 2/2/2023   Periodic Controlled Substance Monitoring Possible medication side effects, risk of tolerance/dependence & alternative treatments discussed. ;No signs of potential drug abuse or diversion identified. ;Assessed functional status. ;Obtaining appropriate analgesic effect of treatment. Health Maintenance Due   Topic Date Due    COVID-19 Vaccine (1) Never done    Shingles vaccine (1 of 2) Never done    DTaP/Tdap/Td vaccine (1 - Tdap) 07/08/2015    Pneumococcal 65+ years Vaccine (2 - PCV) 11/12/2015    Flu vaccine (1) 08/01/2022    Annual Wellness Visit (AWV)  Never done           Patient given educational materials- see patient instructions.   Discussed use, benefit, and side effects of prescribedmedications. All patient questions answered. Pt voiced understanding. Reviewedhealth maintenance. Instructed to continue current medications, diet and exercise. Patient agreed with treatment plan. Follow up as directed.      Electronically signedby Devin Montoya MD on 2/2/2023

## 2023-02-03 ENCOUNTER — TELEPHONE (OUTPATIENT)
Dept: FAMILY MEDICINE CLINIC | Age: 83
End: 2023-02-03

## 2023-02-03 DIAGNOSIS — H26.9 CATARACT OF LEFT EYE, UNSPECIFIED CATARACT TYPE: Primary | ICD-10-CM

## 2023-02-03 NOTE — TELEPHONE ENCOUNTER
----- Message from Talha Frank sent at 2/3/2023  9:54 AM EST -----  Subject: Referral Request    Reason for referral request? Ophthalmologists, pt needing cataract surgery   on left eye. Provider patient wants to be referred to(if known):     Provider Phone Number(if known): Additional Information for Provider? Please call pt back and inform her on   if a referral was sent. Pt states that she had an appointment 02/02/2023   and forgot to ask the  for the referral at that time.   ---------------------------------------------------------------------------  --------------  Jim Duarte PERI    [phone]; OK to leave message on voicemail  ---------------------------------------------------------------------------  --------------

## 2023-02-08 ASSESSMENT — ENCOUNTER SYMPTOMS
NAUSEA: 0
ANAL BLEEDING: 0
CHEST TIGHTNESS: 0
DIARRHEA: 0
SHORTNESS OF BREATH: 0
BLOOD IN STOOL: 0
ABDOMINAL PAIN: 0
CHOKING: 0
WHEEZING: 0
CONSTIPATION: 0
VOMITING: 0
COUGH: 0

## 2023-02-08 ASSESSMENT — VISUAL ACUITY: OU: 1

## 2023-02-21 DIAGNOSIS — E78.00 HYPERCHOLESTEROLEMIA: Primary | ICD-10-CM

## 2023-02-21 DIAGNOSIS — F41.1 GAD (GENERALIZED ANXIETY DISORDER): ICD-10-CM

## 2023-02-23 RX ORDER — BUSPIRONE HYDROCHLORIDE 30 MG/1
30 TABLET ORAL 2 TIMES DAILY
Qty: 180 TABLET | Refills: 1 | Status: SHIPPED | OUTPATIENT
Start: 2023-02-23

## 2023-02-23 RX ORDER — ATORVASTATIN CALCIUM 20 MG/1
20 TABLET, FILM COATED ORAL DAILY
Qty: 90 TABLET | Refills: 1 | Status: SHIPPED | OUTPATIENT
Start: 2023-02-23

## 2023-02-23 RX ORDER — ALPRAZOLAM 0.5 MG/1
0.25 TABLET ORAL DAILY PRN
Qty: 15 TABLET | Refills: 0 | Status: SHIPPED | OUTPATIENT
Start: 2023-02-23 | End: 2023-03-25

## 2023-03-02 ENCOUNTER — OFFICE VISIT (OUTPATIENT)
Dept: FAMILY MEDICINE CLINIC | Age: 83
End: 2023-03-02
Payer: MEDICARE

## 2023-03-02 ENCOUNTER — HOSPITAL ENCOUNTER (OUTPATIENT)
Age: 83
Setting detail: SPECIMEN
Discharge: HOME | End: 2023-03-02

## 2023-03-02 VITALS
SYSTOLIC BLOOD PRESSURE: 130 MMHG | TEMPERATURE: 97.2 F | HEART RATE: 98 BPM | DIASTOLIC BLOOD PRESSURE: 68 MMHG | OXYGEN SATURATION: 98 %

## 2023-03-02 DIAGNOSIS — R52 GENERALIZED BODY ACHES: ICD-10-CM

## 2023-03-02 DIAGNOSIS — F13.939 WITHDRAWAL FROM SEDATIVE, HYPNOTIC, OR ANXIOLYTIC DRUG (HCC): Primary | ICD-10-CM

## 2023-03-02 DIAGNOSIS — F41.9 ANXIETY: ICD-10-CM

## 2023-03-02 DIAGNOSIS — R61 NIGHT SWEATS: ICD-10-CM

## 2023-03-02 PROCEDURE — 1123F ACP DISCUSS/DSCN MKR DOCD: CPT

## 2023-03-02 PROCEDURE — 99213 OFFICE O/P EST LOW 20 MIN: CPT

## 2023-03-02 RX ORDER — HYDROXYZINE HYDROCHLORIDE 25 MG/1
25 TABLET, FILM COATED ORAL EVERY 8 HOURS PRN
Qty: 15 TABLET | Refills: 0 | Status: SHIPPED | OUTPATIENT
Start: 2023-03-02 | End: 2023-03-07

## 2023-03-02 ASSESSMENT — ENCOUNTER SYMPTOMS
ABDOMINAL PAIN: 0
SORE THROAT: 0
RHINORRHEA: 0
DIARRHEA: 1
COLOR CHANGE: 0
COUGH: 0
SHORTNESS OF BREATH: 0
VOMITING: 1

## 2023-03-02 NOTE — PROGRESS NOTES
[facility]  [address]    [facility]  [address] Milena Members [address]tor [address] [facility] [address]  Dept: [phone]    Elke Oneill is a 80 y.o. female Established patient, who presents to the walk-in clinic today with conditions/complaints as noted below:    Chief Complaint   Patient presents with    Generalized Body Aches     Onset 4-5 days, taking tylenol     Diarrhea    Sweats         HPI:     Patient is an 26-year-old female that presents today accompanied by her son for an acute visit. States that she woke up at 3 AM on Sunday with generalized body aches. Reports that she had one episode of diarrhea and vomiting that day, nothing persistent. Denies any fevers. A coworker at her son's work has [phone]. Notes that she's only been able to tolerate milkshakes. Denies any abdominal pain or urinary symptoms. Last night she developed night sweats and states that she hasn't been able to sleep. She attends therapy through [facility] and is prescribed Cymbalta and Buspar. It's also noted that she has been on Xanax for several years. She takes 0.25 mg daily PRN and is given 15 pills a month, but is currently out. Her next refill is scheduled for tomorrow through her PCP. Admits to feeling very anxious and states that she was instructed to come here by her PCP. Denies any URI symptoms, chest pain, shortness of breath, leg swelling, or syncope.        Past Medical History:   Diagnosis Date    Anxiety and depression     Depression     GERD (gastroesophageal reflux disease)     Weight loss        Current Outpatient Medications   Medication Sig Dispense Refill    hydrOXYzine HCl (ATARAX) 25 MG tablet Take 1 tablet by mouth every 8 hours as needed for Itching 15 tablet 0    atorvastatin (LIPITOR) 20 MG tablet Take 1 tablet by mouth daily 90 tablet 1    DULoxetine HCl 60 MG CSDR Take 60 mg by mouth in the morning and at bedtime 180 capsule 1    busPIRone (BUSPAR) 30 MG tablet Take 30 mg by mouth 2 times daily 180 tablet 1    ALPRAZolam (XANAX) 0.5 MG tablet Take 0.5 tablets by mouth daily as needed for Anxiety for up to 30 days. Max Daily Amount: 0.25 mg 15 tablet 0    hydrOXYzine HCl (ATARAX) 10 MG tablet take 1 tablet by mouth three times a day if needed for anxiety      ALPRAZolam (XANAX) 0.25 MG tablet Take 0.25 mg by mouth every other day. (Patient not taking: Reported on 3/2/2023)       No current facility-administered medications for this visit. No Known Allergies    :     Review of Systems   Constitutional:  Positive for diaphoresis (\"night sweats\"). Negative for chills and fever. HENT:  Negative for congestion, rhinorrhea and sore throat. Eyes:  Negative for visual disturbance. Respiratory:  Negative for cough and shortness of breath. Cardiovascular:  Negative for chest pain and leg swelling. Gastrointestinal:  Positive for diarrhea (x1 episode) and vomiting (x1 episode). Negative for abdominal pain. Genitourinary:  Negative for dysuria, frequency and hematuria. Musculoskeletal:  Positive for arthralgias and myalgias. Skin:  Negative for color change and rash. Neurological:  Negative for dizziness, syncope and headaches. Psychiatric/Behavioral:  Positive for sleep disturbance. The patient is nervous/anxious.      :     /68 (Site: Left Upper Arm, Position: Sitting, Cuff Size: Medium Adult)   Pulse 98   Temp 97.2 °F (36.2 °C) (Infrared)   SpO2 98%     Physical Exam  Vitals reviewed. Constitutional:       General: She is not in acute distress. Appearance: Normal appearance. She is diaphoretic. She is not toxic-appearing. HENT:      Head: Normocephalic and atraumatic.       Right Ear: Tympanic membrane, ear canal and external ear normal.      Left Ear: Tympanic membrane, ear canal and external ear normal.      Nose: Nose normal.      Mouth/Throat:      Mouth: Mucous membranes are moist.      Pharynx: Oropharynx is clear. Eyes:      Conjunctiva/sclera: Conjunctivae normal.      Pupils: Pupils are equal, round, and reactive to light. Cardiovascular:      Rate and Rhythm: Normal rate and regular rhythm. Heart sounds: Normal heart sounds. No murmur heard. Pulmonary:      Effort: Pulmonary effort is normal. Tachypnea (hyperventilating) present. No respiratory distress. Breath sounds: Normal breath sounds. Abdominal:      General: Bowel sounds are normal.      Palpations: Abdomen is soft. Tenderness: There is no abdominal tenderness. Musculoskeletal:         General: Normal range of motion. Cervical back: Neck supple. Right lower leg: No edema. Left lower leg: No edema. Lymphadenopathy:      Cervical: No cervical adenopathy. Skin:     General: Skin is warm. Capillary Refill: Capillary refill takes less than 2 seconds. Coloration: Skin is not pale. Neurological:      General: No focal deficit present. Mental Status: She is alert and oriented to person, place, and time. Psychiatric:         Mood and Affect: Mood normal.         Behavior: Behavior normal.         :          1. Withdrawal from sedative, hypnotic, or anxiolytic drug ([address]  2. Anxiety  3. Generalized body aches  -     COVID-19; Future  4. Night sweats  -     COVID-19; Future     :      Return if symptoms worsen or fail to improve. Orders Placed This Encounter   Medications    hydrOXYzine HCl (ATARAX) 25 MG tablet     Sig: Take 1 tablet by mouth every 8 hours as needed for Itching     Dispense:  15 tablet     Refill:  0      PCR COVID-19 swab obtained in office, will call with results. Spoke with the patient's son and advised to closely monitor her. Explained that I believe her symptoms may be related to withdrawal from Xanax as evidenced by long term use and being out.   Recommend trying Atarax to help alleviate symptoms, reinforced side effects. Call PCP office tomorrow for refill and follow-up. Instructed to go to the ER immediately for worsening symptoms or other concerns. Patient and/or parent given educational materials - see patient instructions. Discussed use, benefit, and side effects of prescribed medications. All patient questions answered. Patient and/or parent voiced understanding.       Electronically signed by AILYN Yanez 3/2/2023 at 11:28 AM

## 2023-03-03 ENCOUNTER — TELEPHONE (OUTPATIENT)
Dept: PRIMARY CARE CLINIC | Age: 83
End: 2023-03-03

## 2023-03-03 LAB
SARS-COV-2 RNA RESP QL NAA+PROBE: NORMAL
SARS-COV-2 RNA RESP QL NAA+PROBE: NOT DETECTED
SOURCE: NORMAL

## 2023-03-03 NOTE — TELEPHONE ENCOUNTER
Spoke with the patient, updated that she's doing better. Advised not to combine Xanax and Atarax. Patient acknowledged.

## 2023-03-09 ENCOUNTER — TELEPHONE (OUTPATIENT)
Dept: FAMILY MEDICINE CLINIC | Age: 83
End: 2023-03-09

## 2023-03-09 NOTE — TELEPHONE ENCOUNTER
Patient called the office very anxious. States she is shaking and sweating and is not sure what to do. Patient states she took her xanax this morning. Also [facility] Monday for a virtual appt. Explained to her that we do not have any openings today and if she feels she needs to be seen then suggested she goes to a walk-in clinic. Patient verbalized understanding.

## 2023-03-09 NOTE — TELEPHONE ENCOUNTER
Spoke with patient and states she is doing better. States she ate something and got some sleep. Patient is scheduled for a f/u on 3/15.

## 2023-03-09 NOTE — TELEPHONE ENCOUNTER
She was seen at the walk-in clinic on 3/2 with anxiety-at that time they felt that was due to Xanax withdrawal but she only has been getting 15 pills a month for several years by her report at the time of her visit. She can take an extra BuSpar today if she feels very anxious, please schedule for office visit to follow-up next week.

## 2023-03-14 ENCOUNTER — TELEPHONE (OUTPATIENT)
Dept: FAMILY MEDICINE CLINIC | Age: 83
End: 2023-03-14

## 2023-03-14 NOTE — TELEPHONE ENCOUNTER
She can take an extra Xanax today. She should also take her buspirone her usual medicines and start eating, this will help her anxiety as well. Lets revisit her anxiety tomorrow at her visit with me.

## 2023-03-14 NOTE — TELEPHONE ENCOUNTER
Pts son called stating pt is sweating and lynda     Is taking more than half a Xanax a day, son is not exactly sure how much she is taking a day     Son stated symptoms are not life threatening, no chest pain    Son stated that they know to take pt to ER if needed     Advised son to make sure pt comes to appt to discuss with provider, to see if there may be better treatment options

## 2023-03-14 NOTE — TELEPHONE ENCOUNTER
Patient called to ask if she can take another Xanax today. She is shaky, sweating and states nerves are really bad today. She has not taken the Buspar today or really eaten anything. She was advised to eat something and to take a Buspar.  She was notified some one from the office would call her back w=once Dr Te Phan responded to the message

## 2023-03-15 ENCOUNTER — TELEPHONE (OUTPATIENT)
Dept: ORTHOPEDIC SURGERY | Age: 83
End: 2023-03-15

## 2023-03-15 ENCOUNTER — OFFICE VISIT (OUTPATIENT)
Dept: FAMILY MEDICINE CLINIC | Age: 83
End: 2023-03-15
Payer: MEDICARE

## 2023-03-15 VITALS
WEIGHT: 132 LBS | BODY MASS INDEX: 22.66 KG/M2 | OXYGEN SATURATION: 99 % | DIASTOLIC BLOOD PRESSURE: 78 MMHG | SYSTOLIC BLOOD PRESSURE: 122 MMHG | TEMPERATURE: 97 F | HEART RATE: 72 BPM

## 2023-03-15 DIAGNOSIS — F41.1 GAD (GENERALIZED ANXIETY DISORDER): Primary | ICD-10-CM

## 2023-03-15 DIAGNOSIS — L30.9 DERMATITIS: ICD-10-CM

## 2023-03-15 PROCEDURE — 1123F ACP DISCUSS/DSCN MKR DOCD: CPT | Performed by: INTERNAL MEDICINE

## 2023-03-15 PROCEDURE — 99215 OFFICE O/P EST HI 40 MIN: CPT | Performed by: INTERNAL MEDICINE

## 2023-03-15 RX ORDER — DIAPER,BRIEF,INFANT-TODD,DISP
EACH MISCELLANEOUS
Qty: 30 G | Refills: 0 | Status: SHIPPED | OUTPATIENT
Start: 2023-03-15 | End: 2023-03-22

## 2023-03-15 RX ORDER — HYDROXYZINE HYDROCHLORIDE 25 MG/1
25 TABLET, FILM COATED ORAL EVERY 8 HOURS PRN
Qty: 180 TABLET | Refills: 1 | Status: SHIPPED | OUTPATIENT
Start: 2023-03-15

## 2023-03-15 RX ORDER — HYDROXYZINE HYDROCHLORIDE 25 MG/1
25 TABLET, FILM COATED ORAL EVERY 8 HOURS PRN
Qty: 60 TABLET | Refills: 0 | Status: SHIPPED | OUTPATIENT
Start: 2023-03-15

## 2023-03-15 RX ORDER — ALPRAZOLAM 0.5 MG/1
0.25 TABLET ORAL DAILY PRN
Qty: 15 TABLET | Refills: 0 | Status: SHIPPED | OUTPATIENT
Start: 2023-03-15 | End: 2023-04-14

## 2023-03-15 NOTE — TELEPHONE ENCOUNTER
Writer called patient to advise of details for surgery. Patient was VERY anxious from the time she answered the phone. Says she is deathly afraid of surgery and is not going to be able to go through with it. Wants to know if there is anything else you can offer.

## 2023-03-15 NOTE — PROGRESS NOTES
Patient is here due to being very shaky and sweating through all her clothes. She is not eating. She is fixated on taking Xanax.  She is very anxious

## 2023-03-15 NOTE — PROGRESS NOTES
[facility]  [facility] [address]  [address] [facility] [address]  Dept: [phone]  Dept Fax: [phone]      Mari Ashford is a 80 y.o. female who presents today for hermedical conditions/complaints as noted below. Mari Ashford is c/o of Anxiety        Assessment/Plan:     1. OUSMANE (generalized anxiety disorder)  -     hydrOXYzine HCl (ATARAX) 25 MG tablet; Take 1 tablet by mouth every 8 hours as needed for Anxiety, Disp-180 tablet, R-1Normal  -     hydrOXYzine HCl (ATARAX) 25 MG tablet; Take 1 tablet by mouth every 8 hours as needed for Anxiety, Disp-60 tablet, R-0Normal  -     ALPRAZolam (XANAX) 0.5 MG tablet; Take 0.5 tablets by mouth daily as needed for Anxiety for up to 30 days. Max Daily Amount: 0.25 mg, Disp-15 tablet, R-0Normal  2. Dermatitis  -     hydrocortisone (ALA-MATT) 1 % cream; Apply topically 2 times daily. , Disp-30 g, R-0, Normal        No follow-ups on file. HPI     Anxiety flared up about a week ago   She had an argument with someone in the apartment building where she lives at and following that her anxiety has been really bad. She would like more xanax because it helps her anxiety.        Anxiety          BP Readings from Last 3 Encounters:   03/15/23 122/78   03/02/23 130/68   02/02/23 102/60              Past Medical History:   Diagnosis Date    Anxiety and depression     Depression     GERD (gastroesophageal reflux disease)     Weight loss       Past Surgical History:   Procedure Laterality Date    BREAST BIOPSY Right 2010    COCCYX REMOVAL  09/01/1950    COLONOSCOPY  01/01/2011    COLONOSCOPY  04/25/2014    ENDOSCOPY, COLON, DIAGNOSTIC      EYE SURGERY      injury to eye    SPINE SURGERY      tailbone removed as teenager    TONSILLECTOMY      UPPER GASTROINTESTINAL ENDOSCOPY  04/25/2014       Family History   Problem Relation Age of Onset    Stroke Father        Social History     Tobacco Use    Smoking status: Never    Smokeless tobacco:

## 2023-03-30 ENCOUNTER — OFFICE VISIT (OUTPATIENT)
Dept: ORTHOPEDIC SURGERY | Age: 83
End: 2023-03-30
Payer: MEDICARE

## 2023-03-30 VITALS — HEIGHT: 64 IN | WEIGHT: 132 LBS | OXYGEN SATURATION: 100 % | RESPIRATION RATE: 16 BRPM | BODY MASS INDEX: 22.53 KG/M2

## 2023-03-30 DIAGNOSIS — M21.862 GASTROCNEMIUS EQUINUS OF LEFT LOWER EXTREMITY: ICD-10-CM

## 2023-03-30 DIAGNOSIS — L60.8 TOENAIL DEFORMITY: ICD-10-CM

## 2023-03-30 DIAGNOSIS — M79.672 BILATERAL FOOT PAIN: ICD-10-CM

## 2023-03-30 DIAGNOSIS — M20.12 VALGUS DEFORMITY OF BOTH GREAT TOES: Primary | ICD-10-CM

## 2023-03-30 DIAGNOSIS — M79.671 BILATERAL FOOT PAIN: ICD-10-CM

## 2023-03-30 DIAGNOSIS — M20.11 VALGUS DEFORMITY OF BOTH GREAT TOES: Primary | ICD-10-CM

## 2023-03-30 DIAGNOSIS — M20.40 HAMMER TOE, ACQUIRED: ICD-10-CM

## 2023-03-30 PROCEDURE — 1123F ACP DISCUSS/DSCN MKR DOCD: CPT | Performed by: ORTHOPAEDIC SURGERY

## 2023-03-30 PROCEDURE — 99213 OFFICE O/P EST LOW 20 MIN: CPT | Performed by: ORTHOPAEDIC SURGERY

## 2023-03-30 NOTE — PROGRESS NOTES
[address] [place] [address] AND SPORTS MEDICINE  [facility] Maryann Gamble  [address] [facility] [address]  Dept: [phone]    Ambulatory Orthopedic Consult      CHIEF COMPLAINT:    Chief Complaint   Patient presents with    Foot Pain     Left        HISTORY OF PRESENT ILLNESS:      The patient is a 80 y.o. female who is being seen for evaluation of the above, which began in 2020 atraumatically  . At today's visit, she is using no brace/assistive device. History is obtained today from:   [x]  the patient     [x]  EMR     []  one family member/friend    []  multiple family members/friends    []  other: At today's visit, she localizes her pain to her left greater than right bilateral forefoot. INTERVAL HISTORY 3/30/2023:  She is seen again today in the office for follow up of a previous issue (as above). Since being seen last, the patient is doing about the same overall. At today's visit, she is not using a brace or assistive device. History is obtained today from:   [x]  the patient     []  EMR     []  one family member/friend    []  multiple family members/friends    []  other:      Notably, a member of the office staff is present for the entire patient interaction, Katelin Tavares RN. Notably, the patient is extremely anxious throughout the entirety of the visit, it does seem as though she does potentially in the middle of a panic attack. REVIEW OF SYSTEMS:  Musculoskeletal: See HPI for pertinent positives     Past Medical History:    She  has a past medical history of Anxiety and depression, Depression, GERD (gastroesophageal reflux disease), and Weight loss. Past Surgical History:    She  has a past surgical history that includes Spine surgery; Tonsillectomy; Colonoscopy (01/01/2011); eye surgery; Upper gastrointestinal endoscopy (04/25/2014); Colonoscopy (04/25/2014);  Endoscopy, colon, diagnostic; Coccyx removal (09/01/1950); and

## 2023-04-06 DIAGNOSIS — F41.1 GAD (GENERALIZED ANXIETY DISORDER): ICD-10-CM

## 2023-04-06 RX ORDER — ALPRAZOLAM 0.5 MG/1
0.25 TABLET ORAL DAILY PRN
Qty: 15 TABLET | Refills: 0 | Status: SHIPPED | OUTPATIENT
Start: 2023-04-06 | End: 2023-05-06

## 2023-04-06 RX ORDER — HYDROXYZINE HYDROCHLORIDE 25 MG/1
25 TABLET, FILM COATED ORAL EVERY 8 HOURS PRN
Qty: 60 TABLET | Refills: 0 | Status: SHIPPED | OUTPATIENT
Start: 2023-04-06

## 2023-04-06 NOTE — TELEPHONE ENCOUNTER
Pt called and states she has an appointment with her therapist today. Pt states she needs a refill on her xanax. Pt states she took them all. Pt also states she has not received they hydroxyzine mail order yet. Pt states she needs them to go to [facility] because she is out.

## 2023-04-26 ENCOUNTER — OFFICE VISIT (OUTPATIENT)
Dept: FAMILY MEDICINE CLINIC | Age: 83
End: 2023-04-26
Payer: MEDICARE

## 2023-04-26 VITALS
TEMPERATURE: 96.4 F | HEART RATE: 119 BPM | BODY MASS INDEX: 21.49 KG/M2 | OXYGEN SATURATION: 98 % | SYSTOLIC BLOOD PRESSURE: 102 MMHG | DIASTOLIC BLOOD PRESSURE: 60 MMHG | WEIGHT: 125.2 LBS

## 2023-04-26 DIAGNOSIS — F41.1 GAD (GENERALIZED ANXIETY DISORDER): Primary | ICD-10-CM

## 2023-04-26 DIAGNOSIS — H26.9 CATARACT OF LEFT EYE, UNSPECIFIED CATARACT TYPE: ICD-10-CM

## 2023-04-26 PROCEDURE — 99214 OFFICE O/P EST MOD 30 MIN: CPT | Performed by: INTERNAL MEDICINE

## 2023-04-26 PROCEDURE — 1123F ACP DISCUSS/DSCN MKR DOCD: CPT | Performed by: INTERNAL MEDICINE

## 2023-04-26 RX ORDER — QUETIAPINE FUMARATE 25 MG/1
25 TABLET, FILM COATED ORAL
Qty: 30 TABLET | Refills: 3 | Status: SHIPPED | OUTPATIENT
Start: 2023-04-26

## 2023-05-06 ASSESSMENT — ENCOUNTER SYMPTOMS
ABDOMINAL PAIN: 0
DIARRHEA: 0
COUGH: 0
ANAL BLEEDING: 0
SHORTNESS OF BREATH: 0
CHOKING: 0
BLOOD IN STOOL: 0
VOMITING: 0
CONSTIPATION: 0
CHEST TIGHTNESS: 0
NAUSEA: 0
WHEEZING: 0

## 2023-05-06 ASSESSMENT — VISUAL ACUITY: OU: 1

## 2023-05-16 ENCOUNTER — OFFICE VISIT (OUTPATIENT)
Dept: PODIATRY | Age: 83
End: 2023-05-16
Payer: MEDICARE

## 2023-05-16 VITALS — WEIGHT: 125 LBS | HEIGHT: 64 IN | BODY MASS INDEX: 21.34 KG/M2

## 2023-05-16 DIAGNOSIS — M21.611 BILATERAL BUNIONS: ICD-10-CM

## 2023-05-16 DIAGNOSIS — M21.612 BILATERAL BUNIONS: ICD-10-CM

## 2023-05-16 DIAGNOSIS — M79.604 PAIN IN BOTH LOWER EXTREMITIES: ICD-10-CM

## 2023-05-16 DIAGNOSIS — M20.42 HAMMER TOE OF LEFT FOOT: Primary | ICD-10-CM

## 2023-05-16 DIAGNOSIS — M79.605 PAIN IN BOTH LOWER EXTREMITIES: ICD-10-CM

## 2023-05-16 PROCEDURE — 99204 OFFICE O/P NEW MOD 45 MIN: CPT | Performed by: PODIATRIST

## 2023-05-16 PROCEDURE — 1123F ACP DISCUSS/DSCN MKR DOCD: CPT | Performed by: PODIATRIST

## 2023-06-01 ENCOUNTER — TELEPHONE (OUTPATIENT)
Dept: PODIATRY | Age: 83
End: 2023-06-01

## 2023-06-01 NOTE — TELEPHONE ENCOUNTER
LVM confirming Erick Ko on 7/21/2023 at 10:15 a.am  arrive at 8:15 a.am. P.A.T  7/7/2023  at. 1:00 pm.am., and to call our [place] office to schedule post op visit on 7/27/203.  Mailed OP brochure

## 2023-06-05 PROBLEM — F41.1 GAD (GENERALIZED ANXIETY DISORDER): Status: ACTIVE | Noted: 2023-06-05

## 2023-06-20 ENCOUNTER — TELEPHONE (OUTPATIENT)
Dept: FAMILY MEDICINE CLINIC | Age: 83
End: 2023-06-20

## 2023-06-20 DIAGNOSIS — F41.1 GAD (GENERALIZED ANXIETY DISORDER): Primary | ICD-10-CM

## 2023-06-20 DIAGNOSIS — R25.1 SHAKING: ICD-10-CM

## 2023-06-20 DIAGNOSIS — F41.9 ANXIETY: ICD-10-CM

## 2023-06-20 RX ORDER — HYDROXYZINE HYDROCHLORIDE 25 MG/1
1 TABLET, FILM COATED ORAL DAILY
COMMUNITY
Start: 2023-06-13

## 2023-06-20 NOTE — TELEPHONE ENCOUNTER
Patient walked into the office this morning stating she is having a severe panic attack. She states it started on 06/19/2023. She said she placed a call to [facility] this morning but has not heard from her PSY yet. She did not call 911  She was advised to take 1 more hydroxyzine. She was advised to go to ER if the extra tab does not help. She will be given referral to different PSY.

## 2023-06-21 ENCOUNTER — TELEPHONE (OUTPATIENT)
Dept: FAMILY MEDICINE CLINIC | Age: 83
End: 2023-06-21

## 2023-06-27 ENCOUNTER — TELEPHONE (OUTPATIENT)
Dept: FAMILY MEDICINE CLINIC | Age: 83
End: 2023-06-27

## 2023-07-02 ENCOUNTER — HOSPITAL ENCOUNTER (INPATIENT)
Age: 83
LOS: 8 days | Discharge: NURSING, REHAB OR LONG-TERM CARE | DRG: 880 | End: 2023-07-10
Attending: EMERGENCY MEDICINE | Admitting: INTERNAL MEDICINE
Payer: MEDICARE

## 2023-07-02 ENCOUNTER — APPOINTMENT (OUTPATIENT)
Dept: CT IMAGING | Age: 83
DRG: 880 | End: 2023-07-02
Payer: MEDICARE

## 2023-07-02 ENCOUNTER — APPOINTMENT (OUTPATIENT)
Dept: MRI IMAGING | Age: 83
DRG: 880 | End: 2023-07-02
Payer: MEDICARE

## 2023-07-02 ENCOUNTER — APPOINTMENT (OUTPATIENT)
Dept: GENERAL RADIOLOGY | Age: 83
DRG: 880 | End: 2023-07-02
Payer: MEDICARE

## 2023-07-02 DIAGNOSIS — R29.898 RIGIDITY: ICD-10-CM

## 2023-07-02 DIAGNOSIS — E78.00 HYPERCHOLESTEROLEMIA: ICD-10-CM

## 2023-07-02 DIAGNOSIS — R25.1 TREMOR: ICD-10-CM

## 2023-07-02 DIAGNOSIS — G25.2 ACTION TREMOR: ICD-10-CM

## 2023-07-02 DIAGNOSIS — F41.1 ANXIETY STATE: Primary | ICD-10-CM

## 2023-07-02 DIAGNOSIS — F41.1 GAD (GENERALIZED ANXIETY DISORDER): ICD-10-CM

## 2023-07-02 PROBLEM — F41.9 ANXIETY: Status: ACTIVE | Noted: 2023-07-02

## 2023-07-02 PROBLEM — N39.0 UTI (URINARY TRACT INFECTION): Status: ACTIVE | Noted: 2023-07-02

## 2023-07-02 LAB
25(OH)D3 SERPL-MCNC: 17.7 NG/ML
ALBUMIN SERPL-MCNC: 4.5 G/DL (ref 3.5–5.2)
ALBUMIN/GLOB SERPL: 1.5 {RATIO} (ref 1–2.5)
ALP SERPL-CCNC: 86 U/L (ref 35–104)
ALT SERPL-CCNC: 17 U/L (ref 5–33)
AMMONIA PLAS-SCNC: 31 UMOL/L (ref 11–51)
ANION GAP SERPL CALCULATED.3IONS-SCNC: 18 MMOL/L (ref 9–17)
AST SERPL-CCNC: 21 U/L
BACTERIA URNS QL MICRO: ABNORMAL
BASOPHILS # BLD: 0.04 K/UL (ref 0–0.2)
BASOPHILS NFR BLD: 1 % (ref 0–2)
BILIRUB SERPL-MCNC: 0.7 MG/DL (ref 0.3–1.2)
BILIRUB UR QL STRIP: NEGATIVE
BUN SERPL-MCNC: 23 MG/DL (ref 8–23)
CALCIUM SERPL-MCNC: 10.6 MG/DL (ref 8.6–10.4)
CALCIUM SERPL-MCNC: 9.2 MG/DL (ref 8.6–10.4)
CHLORIDE SERPL-SCNC: 103 MMOL/L (ref 98–107)
CLARITY UR: ABNORMAL
CO2 SERPL-SCNC: 23 MMOL/L (ref 20–31)
COLOR UR: ABNORMAL
CREAT SERPL-MCNC: 0.67 MG/DL (ref 0.5–0.9)
CRP SERPL HS-MCNC: 8.3 MG/L (ref 0–5)
EOSINOPHIL # BLD: 0.05 K/UL (ref 0–0.44)
EOSINOPHILS RELATIVE PERCENT: 1 % (ref 1–4)
EPI CELLS #/AREA URNS HPF: ABNORMAL /HPF (ref 0–5)
ERYTHROCYTE [DISTWIDTH] IN BLOOD BY AUTOMATED COUNT: 12.9 % (ref 11.8–14.4)
GFR SERPL CREATININE-BSD FRML MDRD: >60 ML/MIN/1.73M2
GLUCOSE SERPL-MCNC: 95 MG/DL (ref 70–99)
GLUCOSE UR STRIP-MCNC: NEGATIVE MG/DL
HCT VFR BLD AUTO: 40.7 % (ref 36.3–47.1)
HGB BLD-MCNC: 13.6 G/DL (ref 11.9–15.1)
HGB UR QL STRIP.AUTO: ABNORMAL
IMM GRANULOCYTES # BLD AUTO: <0.03 K/UL (ref 0–0.3)
IMM GRANULOCYTES NFR BLD: 0 %
KETONES UR STRIP-MCNC: ABNORMAL MG/DL
LACTIC ACID, WHOLE BLOOD: 0.8 MMOL/L (ref 0.7–2.1)
LEUKOCYTE ESTERASE UR QL STRIP: ABNORMAL
LYMPHOCYTES # BLD: 32 % (ref 24–43)
LYMPHOCYTES NFR BLD: 2.6 K/UL (ref 1.1–3.7)
MAGNESIUM SERPL-MCNC: 2.2 MG/DL (ref 1.6–2.6)
MCH RBC QN AUTO: 30.7 PG (ref 25.2–33.5)
MCHC RBC AUTO-ENTMCNC: 33.4 G/DL (ref 28.4–34.8)
MCV RBC AUTO: 91.9 FL (ref 82.6–102.9)
MONOCYTES NFR BLD: 0.61 K/UL (ref 0.1–1.2)
MONOCYTES NFR BLD: 8 % (ref 3–12)
NEUTROPHILS NFR BLD: 58 % (ref 36–65)
NEUTS SEG NFR BLD: 4.7 K/UL (ref 1.5–8.1)
NITRITE UR QL STRIP: NEGATIVE
NRBC BLD-RTO: 0 PER 100 WBC
PH UR STRIP: 6 [PH] (ref 5–8)
PLATELET # BLD AUTO: 316 K/UL (ref 138–453)
PMV BLD AUTO: 8.8 FL (ref 8.1–13.5)
POTASSIUM SERPL-SCNC: 3.6 MMOL/L (ref 3.7–5.3)
PROT SERPL-MCNC: 7.5 G/DL (ref 6.4–8.3)
PROT UR STRIP-MCNC: ABNORMAL MG/DL
RBC # BLD AUTO: 4.43 M/UL (ref 3.95–5.11)
RBC #/AREA URNS HPF: ABNORMAL /HPF (ref 0–2)
SARS-COV-2 RDRP RESP QL NAA+PROBE: NOT DETECTED
SODIUM SERPL-SCNC: 144 MMOL/L (ref 135–144)
SP GR UR STRIP: 1.03 (ref 1–1.03)
SPECIMEN DESCRIPTION: NORMAL
TROPONIN I SERPL HS-MCNC: 13 NG/L (ref 0–14)
TSH SERPL DL<=0.05 MIU/L-ACNC: 0.88 UIU/ML (ref 0.3–5)
UROBILINOGEN UR STRIP-ACNC: NORMAL
WBC #/AREA URNS HPF: ABNORMAL /HPF (ref 0–5)
WBC OTHER # BLD: 8 K/UL (ref 3.5–11.3)

## 2023-07-02 PROCEDURE — 93005 ELECTROCARDIOGRAM TRACING: CPT | Performed by: STUDENT IN AN ORGANIZED HEALTH CARE EDUCATION/TRAINING PROGRAM

## 2023-07-02 PROCEDURE — 81001 URINALYSIS AUTO W/SCOPE: CPT

## 2023-07-02 PROCEDURE — 6370000000 HC RX 637 (ALT 250 FOR IP)

## 2023-07-02 PROCEDURE — 70551 MRI BRAIN STEM W/O DYE: CPT

## 2023-07-02 PROCEDURE — 2060000000 HC ICU INTERMEDIATE R&B

## 2023-07-02 PROCEDURE — 71045 X-RAY EXAM CHEST 1 VIEW: CPT

## 2023-07-02 PROCEDURE — 99285 EMERGENCY DEPT VISIT HI MDM: CPT

## 2023-07-02 PROCEDURE — 84484 ASSAY OF TROPONIN QUANT: CPT

## 2023-07-02 PROCEDURE — 6370000000 HC RX 637 (ALT 250 FOR IP): Performed by: STUDENT IN AN ORGANIZED HEALTH CARE EDUCATION/TRAINING PROGRAM

## 2023-07-02 PROCEDURE — 83735 ASSAY OF MAGNESIUM: CPT

## 2023-07-02 PROCEDURE — 2580000003 HC RX 258

## 2023-07-02 PROCEDURE — 82310 ASSAY OF CALCIUM: CPT

## 2023-07-02 PROCEDURE — 83605 ASSAY OF LACTIC ACID: CPT

## 2023-07-02 PROCEDURE — 36415 COLL VENOUS BLD VENIPUNCTURE: CPT

## 2023-07-02 PROCEDURE — 70450 CT HEAD/BRAIN W/O DYE: CPT

## 2023-07-02 PROCEDURE — 87635 SARS-COV-2 COVID-19 AMP PRB: CPT

## 2023-07-02 PROCEDURE — 99222 1ST HOSP IP/OBS MODERATE 55: CPT | Performed by: PSYCHIATRY & NEUROLOGY

## 2023-07-02 PROCEDURE — 99222 1ST HOSP IP/OBS MODERATE 55: CPT | Performed by: NURSE PRACTITIONER

## 2023-07-02 PROCEDURE — 82140 ASSAY OF AMMONIA: CPT

## 2023-07-02 PROCEDURE — 2580000003 HC RX 258: Performed by: STUDENT IN AN ORGANIZED HEALTH CARE EDUCATION/TRAINING PROGRAM

## 2023-07-02 PROCEDURE — 80053 COMPREHEN METABOLIC PANEL: CPT

## 2023-07-02 PROCEDURE — 82306 VITAMIN D 25 HYDROXY: CPT

## 2023-07-02 PROCEDURE — 86140 C-REACTIVE PROTEIN: CPT

## 2023-07-02 PROCEDURE — 6360000002 HC RX W HCPCS

## 2023-07-02 PROCEDURE — 73080 X-RAY EXAM OF ELBOW: CPT

## 2023-07-02 PROCEDURE — 72141 MRI NECK SPINE W/O DYE: CPT

## 2023-07-02 PROCEDURE — 87086 URINE CULTURE/COLONY COUNT: CPT

## 2023-07-02 PROCEDURE — 84443 ASSAY THYROID STIM HORMONE: CPT

## 2023-07-02 PROCEDURE — 85027 COMPLETE CBC AUTOMATED: CPT

## 2023-07-02 RX ORDER — POLYETHYLENE GLYCOL 3350 17 G/17G
17 POWDER, FOR SOLUTION ORAL DAILY PRN
Status: DISCONTINUED | OUTPATIENT
Start: 2023-07-02 | End: 2023-07-10 | Stop reason: HOSPADM

## 2023-07-02 RX ORDER — CEPHALEXIN 250 MG/1
500 CAPSULE ORAL ONCE
Status: COMPLETED | OUTPATIENT
Start: 2023-07-02 | End: 2023-07-02

## 2023-07-02 RX ORDER — LORAZEPAM 0.5 MG/1
0.5 TABLET ORAL
Status: DISCONTINUED | OUTPATIENT
Start: 2023-07-02 | End: 2023-07-03

## 2023-07-02 RX ORDER — BUSPIRONE HYDROCHLORIDE 15 MG/1
30 TABLET ORAL 2 TIMES DAILY
Status: DISCONTINUED | OUTPATIENT
Start: 2023-07-02 | End: 2023-07-03

## 2023-07-02 RX ORDER — ONDANSETRON 2 MG/ML
4 INJECTION INTRAMUSCULAR; INTRAVENOUS EVERY 6 HOURS PRN
Status: DISCONTINUED | OUTPATIENT
Start: 2023-07-02 | End: 2023-07-10 | Stop reason: HOSPADM

## 2023-07-02 RX ORDER — ATORVASTATIN CALCIUM 20 MG/1
20 TABLET, FILM COATED ORAL DAILY
Status: DISCONTINUED | OUTPATIENT
Start: 2023-07-02 | End: 2023-07-10 | Stop reason: HOSPADM

## 2023-07-02 RX ORDER — 0.9 % SODIUM CHLORIDE 0.9 %
500 INTRAVENOUS SOLUTION INTRAVENOUS ONCE
Status: COMPLETED | OUTPATIENT
Start: 2023-07-02 | End: 2023-07-02

## 2023-07-02 RX ORDER — SODIUM CHLORIDE, SODIUM LACTATE, POTASSIUM CHLORIDE, CALCIUM CHLORIDE 600; 310; 30; 20 MG/100ML; MG/100ML; MG/100ML; MG/100ML
INJECTION, SOLUTION INTRAVENOUS CONTINUOUS
Status: DISCONTINUED | OUTPATIENT
Start: 2023-07-02 | End: 2023-07-06

## 2023-07-02 RX ORDER — ENOXAPARIN SODIUM 100 MG/ML
40 INJECTION SUBCUTANEOUS DAILY
Status: DISCONTINUED | OUTPATIENT
Start: 2023-07-02 | End: 2023-07-10 | Stop reason: HOSPADM

## 2023-07-02 RX ORDER — OLANZAPINE 5 MG/1
5 TABLET ORAL NIGHTLY
Status: DISCONTINUED | OUTPATIENT
Start: 2023-07-02 | End: 2023-07-03

## 2023-07-02 RX ORDER — SODIUM CHLORIDE 0.9 % (FLUSH) 0.9 %
5-40 SYRINGE (ML) INJECTION PRN
Status: DISCONTINUED | OUTPATIENT
Start: 2023-07-02 | End: 2023-07-10 | Stop reason: HOSPADM

## 2023-07-02 RX ORDER — ACETAMINOPHEN 650 MG/1
650 SUPPOSITORY RECTAL EVERY 6 HOURS PRN
Status: DISCONTINUED | OUTPATIENT
Start: 2023-07-02 | End: 2023-07-10 | Stop reason: HOSPADM

## 2023-07-02 RX ORDER — POTASSIUM CHLORIDE 20 MEQ/1
40 TABLET, EXTENDED RELEASE ORAL ONCE
Status: DISCONTINUED | OUTPATIENT
Start: 2023-07-02 | End: 2023-07-05

## 2023-07-02 RX ORDER — SODIUM CHLORIDE 0.9 % (FLUSH) 0.9 %
5-40 SYRINGE (ML) INJECTION EVERY 12 HOURS SCHEDULED
Status: DISCONTINUED | OUTPATIENT
Start: 2023-07-02 | End: 2023-07-10 | Stop reason: HOSPADM

## 2023-07-02 RX ORDER — LORAZEPAM 0.5 MG/1
0.5 TABLET ORAL ONCE
Status: COMPLETED | OUTPATIENT
Start: 2023-07-02 | End: 2023-07-02

## 2023-07-02 RX ORDER — HYDROXYZINE HYDROCHLORIDE 10 MG/1
10 TABLET, FILM COATED ORAL 3 TIMES DAILY PRN
Status: DISCONTINUED | OUTPATIENT
Start: 2023-07-02 | End: 2023-07-03

## 2023-07-02 RX ORDER — DULOXETIN HYDROCHLORIDE 30 MG/1
60 CAPSULE, DELAYED RELEASE ORAL 2 TIMES DAILY
Status: DISCONTINUED | OUTPATIENT
Start: 2023-07-02 | End: 2023-07-10 | Stop reason: HOSPADM

## 2023-07-02 RX ORDER — LORAZEPAM 1 MG/1
1 TABLET ORAL EVERY 6 HOURS PRN
Status: DISCONTINUED | OUTPATIENT
Start: 2023-07-02 | End: 2023-07-02

## 2023-07-02 RX ORDER — SODIUM CHLORIDE 9 MG/ML
INJECTION, SOLUTION INTRAVENOUS PRN
Status: DISCONTINUED | OUTPATIENT
Start: 2023-07-02 | End: 2023-07-10 | Stop reason: HOSPADM

## 2023-07-02 RX ORDER — ACETAMINOPHEN 325 MG/1
650 TABLET ORAL EVERY 6 HOURS PRN
Status: DISCONTINUED | OUTPATIENT
Start: 2023-07-02 | End: 2023-07-10 | Stop reason: HOSPADM

## 2023-07-02 RX ORDER — ONDANSETRON 4 MG/1
4 TABLET, ORALLY DISINTEGRATING ORAL EVERY 8 HOURS PRN
Status: DISCONTINUED | OUTPATIENT
Start: 2023-07-02 | End: 2023-07-10 | Stop reason: HOSPADM

## 2023-07-02 RX ADMIN — LORAZEPAM 0.5 MG: 0.5 TABLET ORAL at 05:22

## 2023-07-02 RX ADMIN — DULOXETINE HYDROCHLORIDE 60 MG: 30 CAPSULE, DELAYED RELEASE ORAL at 21:08

## 2023-07-02 RX ADMIN — LORAZEPAM 0.5 MG: 0.5 TABLET ORAL at 13:41

## 2023-07-02 RX ADMIN — OLANZAPINE 5 MG: 5 TABLET, FILM COATED ORAL at 21:09

## 2023-07-02 RX ADMIN — LORAZEPAM 0.5 MG: 0.5 TABLET ORAL at 21:09

## 2023-07-02 RX ADMIN — ATORVASTATIN CALCIUM 20 MG: 20 TABLET, FILM COATED ORAL at 13:07

## 2023-07-02 RX ADMIN — HYDROXYZINE HYDROCHLORIDE 10 MG: 10 TABLET ORAL at 13:07

## 2023-07-02 RX ADMIN — SODIUM CHLORIDE, PRESERVATIVE FREE 10 ML: 5 INJECTION INTRAVENOUS at 21:09

## 2023-07-02 RX ADMIN — SODIUM CHLORIDE 500 ML: 9 INJECTION, SOLUTION INTRAVENOUS at 08:23

## 2023-07-02 RX ADMIN — SODIUM CHLORIDE, POTASSIUM CHLORIDE, SODIUM LACTATE AND CALCIUM CHLORIDE: 600; 310; 30; 20 INJECTION, SOLUTION INTRAVENOUS at 22:20

## 2023-07-02 RX ADMIN — ACETAMINOPHEN 650 MG: 325 TABLET ORAL at 15:14

## 2023-07-02 RX ADMIN — SODIUM CHLORIDE 500 ML: 9 INJECTION, SOLUTION INTRAVENOUS at 05:24

## 2023-07-02 RX ADMIN — DULOXETINE HYDROCHLORIDE 60 MG: 30 CAPSULE, DELAYED RELEASE ORAL at 13:07

## 2023-07-02 RX ADMIN — CEPHALEXIN 500 MG: 250 CAPSULE ORAL at 10:34

## 2023-07-02 RX ADMIN — BUSPIRONE HYDROCHLORIDE 30 MG: 15 TABLET ORAL at 13:07

## 2023-07-02 RX ADMIN — LORAZEPAM 0.5 MG: 0.5 TABLET ORAL at 16:34

## 2023-07-02 RX ADMIN — POLYETHYLENE GLYCOL 3350 17 G: 17 POWDER, FOR SOLUTION ORAL at 13:07

## 2023-07-02 RX ADMIN — BUSPIRONE HYDROCHLORIDE 30 MG: 15 TABLET ORAL at 21:08

## 2023-07-02 RX ADMIN — SODIUM CHLORIDE, PRESERVATIVE FREE 10 ML: 5 INJECTION INTRAVENOUS at 13:42

## 2023-07-02 RX ADMIN — CEFTRIAXONE SODIUM 1000 MG: 10 INJECTION, POWDER, FOR SOLUTION INTRAVENOUS at 13:42

## 2023-07-02 RX ADMIN — LORAZEPAM 0.5 MG: 0.5 TABLET ORAL at 22:23

## 2023-07-02 ASSESSMENT — ENCOUNTER SYMPTOMS
SHORTNESS OF BREATH: 0
RHINORRHEA: 0
DIARRHEA: 0
ABDOMINAL DISTENTION: 0
SHORTNESS OF BREATH: 1
VOMITING: 0
COLOR CHANGE: 0
CONSTIPATION: 0
BACK PAIN: 0
ABDOMINAL PAIN: 0
NAUSEA: 0

## 2023-07-02 ASSESSMENT — PAIN SCALES - GENERAL
PAINLEVEL_OUTOF10: 0
PAINLEVEL_OUTOF10: 0

## 2023-07-02 ASSESSMENT — PAIN - FUNCTIONAL ASSESSMENT: PAIN_FUNCTIONAL_ASSESSMENT: 0-10

## 2023-07-03 PROBLEM — R25.1 TREMOR: Status: ACTIVE | Noted: 2023-07-03

## 2023-07-03 PROBLEM — G25.2 ACTION TREMOR: Status: ACTIVE | Noted: 2023-07-03

## 2023-07-03 PROBLEM — F05 DELIRIUM DUE TO ANOTHER MEDICAL CONDITION: Status: ACTIVE | Noted: 2023-07-03

## 2023-07-03 LAB
MICROORGANISM SPEC CULT: NORMAL
SPECIMEN DESCRIPTION: NORMAL

## 2023-07-03 PROCEDURE — 99232 SBSQ HOSP IP/OBS MODERATE 35: CPT | Performed by: PSYCHIATRY & NEUROLOGY

## 2023-07-03 PROCEDURE — 6360000002 HC RX W HCPCS

## 2023-07-03 PROCEDURE — 2580000003 HC RX 258

## 2023-07-03 PROCEDURE — 99222 1ST HOSP IP/OBS MODERATE 55: CPT | Performed by: PSYCHIATRY & NEUROLOGY

## 2023-07-03 PROCEDURE — 99222 1ST HOSP IP/OBS MODERATE 55: CPT | Performed by: INTERNAL MEDICINE

## 2023-07-03 PROCEDURE — 6370000000 HC RX 637 (ALT 250 FOR IP)

## 2023-07-03 PROCEDURE — 97530 THERAPEUTIC ACTIVITIES: CPT

## 2023-07-03 PROCEDURE — 6370000000 HC RX 637 (ALT 250 FOR IP): Performed by: PSYCHIATRY & NEUROLOGY

## 2023-07-03 PROCEDURE — 97166 OT EVAL MOD COMPLEX 45 MIN: CPT

## 2023-07-03 PROCEDURE — 2060000000 HC ICU INTERMEDIATE R&B

## 2023-07-03 PROCEDURE — 97162 PT EVAL MOD COMPLEX 30 MIN: CPT

## 2023-07-03 PROCEDURE — 97535 SELF CARE MNGMENT TRAINING: CPT

## 2023-07-03 PROCEDURE — APPSS30 APP SPLIT SHARED TIME 16-30 MINUTES: Performed by: NURSE PRACTITIONER

## 2023-07-03 RX ORDER — QUETIAPINE FUMARATE 25 MG/1
25 TABLET, FILM COATED ORAL PRN
Status: DISCONTINUED | OUTPATIENT
Start: 2023-07-03 | End: 2023-07-05

## 2023-07-03 RX ORDER — BUSPIRONE HYDROCHLORIDE 15 MG/1
15 TABLET ORAL 3 TIMES DAILY
Status: DISCONTINUED | OUTPATIENT
Start: 2023-07-03 | End: 2023-07-10 | Stop reason: HOSPADM

## 2023-07-03 RX ORDER — PROPRANOLOL HYDROCHLORIDE 10 MG/1
10 TABLET ORAL 2 TIMES DAILY
Status: DISCONTINUED | OUTPATIENT
Start: 2023-07-03 | End: 2023-07-10 | Stop reason: HOSPADM

## 2023-07-03 RX ADMIN — LORAZEPAM 0.5 MG: 0.5 TABLET ORAL at 04:32

## 2023-07-03 RX ADMIN — DULOXETINE HYDROCHLORIDE 60 MG: 30 CAPSULE, DELAYED RELEASE ORAL at 20:49

## 2023-07-03 RX ADMIN — ATORVASTATIN CALCIUM 20 MG: 20 TABLET, FILM COATED ORAL at 08:53

## 2023-07-03 RX ADMIN — CEFTRIAXONE SODIUM 1000 MG: 10 INJECTION, POWDER, FOR SOLUTION INTRAVENOUS at 11:10

## 2023-07-03 RX ADMIN — DULOXETINE HYDROCHLORIDE 60 MG: 30 CAPSULE, DELAYED RELEASE ORAL at 08:53

## 2023-07-03 RX ADMIN — LORAZEPAM 0.5 MG: 0.5 TABLET ORAL at 08:53

## 2023-07-03 RX ADMIN — BUSPIRONE HYDROCHLORIDE 15 MG: 15 TABLET ORAL at 20:49

## 2023-07-03 RX ADMIN — LORAZEPAM 0.5 MG: 0.5 TABLET ORAL at 11:10

## 2023-07-03 RX ADMIN — PROPRANOLOL HYDROCHLORIDE 10 MG: 10 TABLET ORAL at 20:49

## 2023-07-03 RX ADMIN — ACETAMINOPHEN 650 MG: 325 TABLET ORAL at 04:34

## 2023-07-03 RX ADMIN — QUETIAPINE FUMARATE 25 MG: 25 TABLET ORAL at 19:52

## 2023-07-03 RX ADMIN — ENOXAPARIN SODIUM 40 MG: 40 INJECTION SUBCUTANEOUS at 08:53

## 2023-07-03 RX ADMIN — LORAZEPAM 0.5 MG: 0.5 TABLET ORAL at 14:03

## 2023-07-03 RX ADMIN — SODIUM CHLORIDE, PRESERVATIVE FREE 10 ML: 5 INJECTION INTRAVENOUS at 20:50

## 2023-07-03 RX ADMIN — BUSPIRONE HYDROCHLORIDE 30 MG: 15 TABLET ORAL at 08:53

## 2023-07-03 ASSESSMENT — ENCOUNTER SYMPTOMS
CONSTIPATION: 0
COUGH: 0
CHEST TIGHTNESS: 0
DIARRHEA: 0
SHORTNESS OF BREATH: 1
ABDOMINAL DISTENTION: 0
ABDOMINAL PAIN: 0
VOMITING: 0
WHEEZING: 0
NAUSEA: 0

## 2023-07-03 ASSESSMENT — PAIN DESCRIPTION - ORIENTATION: ORIENTATION: LEFT

## 2023-07-03 ASSESSMENT — PAIN SCALES - GENERAL
PAINLEVEL_OUTOF10: 3
PAINLEVEL_OUTOF10: 0

## 2023-07-03 ASSESSMENT — PAIN DESCRIPTION - LOCATION: LOCATION: SHOULDER

## 2023-07-03 ASSESSMENT — PAIN DESCRIPTION - DESCRIPTORS: DESCRIPTORS: ACHING

## 2023-07-03 ASSESSMENT — PAIN - FUNCTIONAL ASSESSMENT: PAIN_FUNCTIONAL_ASSESSMENT: PREVENTS OR INTERFERES SOME ACTIVE ACTIVITIES AND ADLS

## 2023-07-04 PROBLEM — G20.C PARKINSONISM: Status: ACTIVE | Noted: 2023-07-04

## 2023-07-04 PROBLEM — G25.2 ACTION TREMOR: Status: ACTIVE | Noted: 2023-07-04

## 2023-07-04 PROBLEM — G20 PARKINSONISM (HCC): Status: ACTIVE | Noted: 2023-07-04

## 2023-07-04 PROBLEM — R41.0 DELIRIUM: Status: ACTIVE | Noted: 2023-07-03

## 2023-07-04 LAB
ANION GAP SERPL CALCULATED.3IONS-SCNC: 11 MMOL/L (ref 9–17)
BASOPHILS # BLD: 0.05 K/UL (ref 0–0.2)
BASOPHILS NFR BLD: 1 % (ref 0–2)
BUN SERPL-MCNC: 7 MG/DL (ref 8–23)
CALCIUM SERPL-MCNC: 10 MG/DL (ref 8.6–10.4)
CHLORIDE SERPL-SCNC: 103 MMOL/L (ref 98–107)
CO2 SERPL-SCNC: 24 MMOL/L (ref 20–31)
CREAT SERPL-MCNC: 0.44 MG/DL (ref 0.5–0.9)
EKG ATRIAL RATE: 107 BPM
EKG P AXIS: 75 DEGREES
EKG P-R INTERVAL: 116 MS
EKG Q-T INTERVAL: 340 MS
EKG QRS DURATION: 80 MS
EKG QTC CALCULATION (BAZETT): 453 MS
EKG R AXIS: 69 DEGREES
EKG T AXIS: 39 DEGREES
EKG VENTRICULAR RATE: 107 BPM
EOSINOPHIL # BLD: 0.08 K/UL (ref 0–0.44)
EOSINOPHILS RELATIVE PERCENT: 1 % (ref 1–4)
ERYTHROCYTE [DISTWIDTH] IN BLOOD BY AUTOMATED COUNT: 13 % (ref 11.8–14.4)
GFR SERPL CREATININE-BSD FRML MDRD: >60 ML/MIN/1.73M2
GLUCOSE SERPL-MCNC: 108 MG/DL (ref 70–99)
HCT VFR BLD AUTO: 38.3 % (ref 36.3–47.1)
HGB BLD-MCNC: 12.7 G/DL (ref 11.9–15.1)
IMM GRANULOCYTES # BLD AUTO: <0.03 K/UL (ref 0–0.3)
IMM GRANULOCYTES NFR BLD: 0 %
LYMPHOCYTES # BLD: 35 % (ref 24–43)
LYMPHOCYTES NFR BLD: 2.2 K/UL (ref 1.1–3.7)
MCH RBC QN AUTO: 30.8 PG (ref 25.2–33.5)
MCHC RBC AUTO-ENTMCNC: 33.2 G/DL (ref 28.4–34.8)
MCV RBC AUTO: 93 FL (ref 82.6–102.9)
MONOCYTES NFR BLD: 0.63 K/UL (ref 0.1–1.2)
MONOCYTES NFR BLD: 10 % (ref 3–12)
NEUTROPHILS NFR BLD: 53 % (ref 36–65)
NEUTS SEG NFR BLD: 3.39 K/UL (ref 1.5–8.1)
NRBC BLD-RTO: 0 PER 100 WBC
PLATELET # BLD AUTO: 293 K/UL (ref 138–453)
PMV BLD AUTO: 9.6 FL (ref 8.1–13.5)
POTASSIUM SERPL-SCNC: 4 MMOL/L (ref 3.7–5.3)
RBC # BLD AUTO: 4.12 M/UL (ref 3.95–5.11)
SODIUM SERPL-SCNC: 138 MMOL/L (ref 135–144)
WBC OTHER # BLD: 6.4 K/UL (ref 3.5–11.3)

## 2023-07-04 PROCEDURE — 2060000000 HC ICU INTERMEDIATE R&B

## 2023-07-04 PROCEDURE — 36415 COLL VENOUS BLD VENIPUNCTURE: CPT

## 2023-07-04 PROCEDURE — 6370000000 HC RX 637 (ALT 250 FOR IP): Performed by: PSYCHIATRY & NEUROLOGY

## 2023-07-04 PROCEDURE — 93010 ELECTROCARDIOGRAM REPORT: CPT | Performed by: INTERNAL MEDICINE

## 2023-07-04 PROCEDURE — 6360000002 HC RX W HCPCS

## 2023-07-04 PROCEDURE — 6370000000 HC RX 637 (ALT 250 FOR IP)

## 2023-07-04 PROCEDURE — 2580000003 HC RX 258

## 2023-07-04 PROCEDURE — 80048 BASIC METABOLIC PNL TOTAL CA: CPT

## 2023-07-04 PROCEDURE — 99232 SBSQ HOSP IP/OBS MODERATE 35: CPT | Performed by: PSYCHIATRY & NEUROLOGY

## 2023-07-04 PROCEDURE — APPSS30 APP SPLIT SHARED TIME 16-30 MINUTES: Performed by: NURSE PRACTITIONER

## 2023-07-04 PROCEDURE — 85027 COMPLETE CBC AUTOMATED: CPT

## 2023-07-04 PROCEDURE — 99232 SBSQ HOSP IP/OBS MODERATE 35: CPT | Performed by: INTERNAL MEDICINE

## 2023-07-04 RX ADMIN — BUSPIRONE HYDROCHLORIDE 15 MG: 15 TABLET ORAL at 09:46

## 2023-07-04 RX ADMIN — BUSPIRONE HYDROCHLORIDE 15 MG: 15 TABLET ORAL at 20:26

## 2023-07-04 RX ADMIN — BUSPIRONE HYDROCHLORIDE 15 MG: 15 TABLET ORAL at 14:47

## 2023-07-04 RX ADMIN — DULOXETINE HYDROCHLORIDE 60 MG: 30 CAPSULE, DELAYED RELEASE ORAL at 20:26

## 2023-07-04 RX ADMIN — ENOXAPARIN SODIUM 40 MG: 40 INJECTION SUBCUTANEOUS at 09:46

## 2023-07-04 RX ADMIN — SODIUM CHLORIDE, PRESERVATIVE FREE 10 ML: 5 INJECTION INTRAVENOUS at 20:27

## 2023-07-04 RX ADMIN — PROPRANOLOL HYDROCHLORIDE 10 MG: 10 TABLET ORAL at 20:25

## 2023-07-04 RX ADMIN — SODIUM CHLORIDE, PRESERVATIVE FREE 10 ML: 5 INJECTION INTRAVENOUS at 09:52

## 2023-07-04 RX ADMIN — ATORVASTATIN CALCIUM 20 MG: 20 TABLET, FILM COATED ORAL at 09:46

## 2023-07-04 RX ADMIN — PROPRANOLOL HYDROCHLORIDE 10 MG: 10 TABLET ORAL at 10:53

## 2023-07-04 RX ADMIN — DULOXETINE HYDROCHLORIDE 60 MG: 30 CAPSULE, DELAYED RELEASE ORAL at 09:46

## 2023-07-04 RX ADMIN — CEFTRIAXONE SODIUM 1000 MG: 10 INJECTION, POWDER, FOR SOLUTION INTRAVENOUS at 09:45

## 2023-07-04 ASSESSMENT — ENCOUNTER SYMPTOMS
CHEST TIGHTNESS: 0
NAUSEA: 0
CONSTIPATION: 0
ABDOMINAL PAIN: 0
DIARRHEA: 0
VOMITING: 0
SHORTNESS OF BREATH: 0
COUGH: 0

## 2023-07-05 LAB
ANION GAP SERPL CALCULATED.3IONS-SCNC: 10 MMOL/L (ref 9–17)
BUN SERPL-MCNC: 11 MG/DL (ref 8–23)
CALCIUM SERPL-MCNC: 9.5 MG/DL (ref 8.6–10.4)
CHLORIDE SERPL-SCNC: 105 MMOL/L (ref 98–107)
CO2 SERPL-SCNC: 25 MMOL/L (ref 20–31)
CREAT SERPL-MCNC: 0.45 MG/DL (ref 0.5–0.9)
GFR SERPL CREATININE-BSD FRML MDRD: >60 ML/MIN/1.73M2
GLUCOSE SERPL-MCNC: 115 MG/DL (ref 70–99)
POTASSIUM SERPL-SCNC: 3.3 MMOL/L (ref 3.7–5.3)
SODIUM SERPL-SCNC: 140 MMOL/L (ref 135–144)

## 2023-07-05 PROCEDURE — 6370000000 HC RX 637 (ALT 250 FOR IP): Performed by: PSYCHIATRY & NEUROLOGY

## 2023-07-05 PROCEDURE — 80048 BASIC METABOLIC PNL TOTAL CA: CPT

## 2023-07-05 PROCEDURE — 97530 THERAPEUTIC ACTIVITIES: CPT

## 2023-07-05 PROCEDURE — 2060000000 HC ICU INTERMEDIATE R&B

## 2023-07-05 PROCEDURE — 6360000002 HC RX W HCPCS

## 2023-07-05 PROCEDURE — 2580000003 HC RX 258

## 2023-07-05 PROCEDURE — 6370000000 HC RX 637 (ALT 250 FOR IP)

## 2023-07-05 PROCEDURE — 36415 COLL VENOUS BLD VENIPUNCTURE: CPT

## 2023-07-05 PROCEDURE — 99232 SBSQ HOSP IP/OBS MODERATE 35: CPT | Performed by: INTERNAL MEDICINE

## 2023-07-05 PROCEDURE — 97535 SELF CARE MNGMENT TRAINING: CPT

## 2023-07-05 RX ORDER — DULOXETIN HYDROCHLORIDE 60 MG/1
60 CAPSULE, DELAYED RELEASE ORAL 2 TIMES DAILY
Qty: 30 CAPSULE | Refills: 3 | Status: SHIPPED | OUTPATIENT
Start: 2023-07-05 | End: 2023-07-09 | Stop reason: SDUPTHER

## 2023-07-05 RX ORDER — PROPRANOLOL HYDROCHLORIDE 10 MG/1
10 TABLET ORAL 2 TIMES DAILY
Qty: 90 TABLET | Refills: 3 | Status: SHIPPED | OUTPATIENT
Start: 2023-07-05 | End: 2023-07-09 | Stop reason: SDUPTHER

## 2023-07-05 RX ORDER — BUSPIRONE HYDROCHLORIDE 15 MG/1
15 TABLET ORAL 3 TIMES DAILY
Qty: 60 TABLET | Refills: 1 | Status: SHIPPED | OUTPATIENT
Start: 2023-07-05 | End: 2023-07-09 | Stop reason: SDUPTHER

## 2023-07-05 RX ORDER — QUETIAPINE FUMARATE 25 MG/1
25 TABLET, FILM COATED ORAL EVERY 6 HOURS PRN
Status: DISCONTINUED | OUTPATIENT
Start: 2023-07-05 | End: 2023-07-05

## 2023-07-05 RX ORDER — DOCUSATE SODIUM 100 MG/1
100 CAPSULE, LIQUID FILLED ORAL DAILY
Status: DISCONTINUED | OUTPATIENT
Start: 2023-07-05 | End: 2023-07-10 | Stop reason: HOSPADM

## 2023-07-05 RX ORDER — PSEUDOEPHEDRINE HCL 30 MG
100 TABLET ORAL DAILY PRN
Qty: 30 CAPSULE | Refills: 0 | Status: SHIPPED | OUTPATIENT
Start: 2023-07-05 | End: 2023-07-09 | Stop reason: HOSPADM

## 2023-07-05 RX ORDER — POTASSIUM CHLORIDE 20 MEQ/1
40 TABLET, EXTENDED RELEASE ORAL ONCE
Status: COMPLETED | OUTPATIENT
Start: 2023-07-05 | End: 2023-07-05

## 2023-07-05 RX ORDER — QUETIAPINE FUMARATE 25 MG/1
25 TABLET, FILM COATED ORAL
Status: COMPLETED | OUTPATIENT
Start: 2023-07-05 | End: 2023-07-06

## 2023-07-05 RX ADMIN — BUSPIRONE HYDROCHLORIDE 15 MG: 15 TABLET ORAL at 15:31

## 2023-07-05 RX ADMIN — CEFTRIAXONE SODIUM 1000 MG: 10 INJECTION, POWDER, FOR SOLUTION INTRAVENOUS at 09:49

## 2023-07-05 RX ADMIN — DOCUSATE SODIUM 100 MG: 100 CAPSULE ORAL at 15:31

## 2023-07-05 RX ADMIN — QUETIAPINE FUMARATE 25 MG: 25 TABLET ORAL at 08:38

## 2023-07-05 RX ADMIN — POTASSIUM CHLORIDE 40 MEQ: 1500 TABLET, EXTENDED RELEASE ORAL at 09:50

## 2023-07-05 RX ADMIN — ATORVASTATIN CALCIUM 20 MG: 20 TABLET, FILM COATED ORAL at 07:36

## 2023-07-05 RX ADMIN — PROPRANOLOL HYDROCHLORIDE 10 MG: 10 TABLET ORAL at 07:36

## 2023-07-05 RX ADMIN — DULOXETINE HYDROCHLORIDE 60 MG: 30 CAPSULE, DELAYED RELEASE ORAL at 07:36

## 2023-07-05 RX ADMIN — SODIUM CHLORIDE, PRESERVATIVE FREE 10 ML: 5 INJECTION INTRAVENOUS at 07:38

## 2023-07-05 RX ADMIN — ENOXAPARIN SODIUM 40 MG: 40 INJECTION SUBCUTANEOUS at 07:38

## 2023-07-05 RX ADMIN — SODIUM CHLORIDE, PRESERVATIVE FREE 10 ML: 5 INJECTION INTRAVENOUS at 21:44

## 2023-07-05 RX ADMIN — BUSPIRONE HYDROCHLORIDE 15 MG: 15 TABLET ORAL at 21:42

## 2023-07-05 RX ADMIN — BUSPIRONE HYDROCHLORIDE 15 MG: 15 TABLET ORAL at 07:38

## 2023-07-05 ASSESSMENT — ENCOUNTER SYMPTOMS
ABDOMINAL PAIN: 0
SORE THROAT: 0
GASTROINTESTINAL NEGATIVE: 1
CONSTIPATION: 0
NAUSEA: 0
WHEEZING: 0
RESPIRATORY NEGATIVE: 1
DIARRHEA: 0
ABDOMINAL DISTENTION: 0
STRIDOR: 0
APNEA: 0
SHORTNESS OF BREATH: 0
CHEST TIGHTNESS: 0
VOMITING: 0
EYES NEGATIVE: 1
COUGH: 0

## 2023-07-05 ASSESSMENT — PAIN SCALES - GENERAL: PAINLEVEL_OUTOF10: 0

## 2023-07-06 PROBLEM — R29.898 RIGIDITY: Status: ACTIVE | Noted: 2023-07-06

## 2023-07-06 LAB
AMPHET UR QL SCN: NEGATIVE
ANION GAP SERPL CALCULATED.3IONS-SCNC: 6 MMOL/L (ref 9–17)
BARBITURATES UR QL SCN: NEGATIVE
BASOPHILS # BLD: 0.04 K/UL (ref 0–0.2)
BASOPHILS NFR BLD: 1 % (ref 0–2)
BENZODIAZ UR QL: NEGATIVE
BUN SERPL-MCNC: 12 MG/DL (ref 8–23)
CALCIUM SERPL-MCNC: 9.8 MG/DL (ref 8.6–10.4)
CANNABINOIDS UR QL SCN: NEGATIVE
CHLORIDE SERPL-SCNC: 106 MMOL/L (ref 98–107)
CO2 SERPL-SCNC: 28 MMOL/L (ref 20–31)
COCAINE UR QL SCN: NEGATIVE
CREAT SERPL-MCNC: 0.53 MG/DL (ref 0.5–0.9)
EKG ATRIAL RATE: 97 BPM
EKG P AXIS: 72 DEGREES
EKG P-R INTERVAL: 134 MS
EKG Q-T INTERVAL: 362 MS
EKG QRS DURATION: 72 MS
EKG QTC CALCULATION (BAZETT): 459 MS
EKG R AXIS: 72 DEGREES
EKG T AXIS: 63 DEGREES
EKG VENTRICULAR RATE: 97 BPM
EOSINOPHIL # BLD: 0.13 K/UL (ref 0–0.44)
EOSINOPHILS RELATIVE PERCENT: 2 % (ref 1–4)
ERYTHROCYTE [DISTWIDTH] IN BLOOD BY AUTOMATED COUNT: 13.3 % (ref 11.8–14.4)
FENTANYL UR QL: NEGATIVE
GFR SERPL CREATININE-BSD FRML MDRD: >60 ML/MIN/1.73M2
GLUCOSE SERPL-MCNC: 104 MG/DL (ref 70–99)
HCT VFR BLD AUTO: 40.7 % (ref 36.3–47.1)
HGB BLD-MCNC: 13 G/DL (ref 11.9–15.1)
IMM GRANULOCYTES # BLD AUTO: <0.03 K/UL (ref 0–0.3)
IMM GRANULOCYTES NFR BLD: 0 %
LYMPHOCYTES # BLD: 38 % (ref 24–43)
LYMPHOCYTES NFR BLD: 2.18 K/UL (ref 1.1–3.7)
MCH RBC QN AUTO: 31.2 PG (ref 25.2–33.5)
MCHC RBC AUTO-ENTMCNC: 31.9 G/DL (ref 28.4–34.8)
MCV RBC AUTO: 97.6 FL (ref 82.6–102.9)
METHADONE UR QL: NEGATIVE
MONOCYTES NFR BLD: 0.55 K/UL (ref 0.1–1.2)
MONOCYTES NFR BLD: 10 % (ref 3–12)
NEUTROPHILS NFR BLD: 49 % (ref 36–65)
NEUTS SEG NFR BLD: 2.79 K/UL (ref 1.5–8.1)
NRBC BLD-RTO: 0 PER 100 WBC
OPIATES UR QL SCN: NEGATIVE
OXYCODONE UR QL SCN: NEGATIVE
PCP UR QL SCN: NEGATIVE
PLATELET # BLD AUTO: 275 K/UL (ref 138–453)
PMV BLD AUTO: 9.9 FL (ref 8.1–13.5)
POTASSIUM SERPL-SCNC: 4.7 MMOL/L (ref 3.7–5.3)
RBC # BLD AUTO: 4.17 M/UL (ref 3.95–5.11)
SODIUM SERPL-SCNC: 140 MMOL/L (ref 135–144)
TEST INFORMATION: NORMAL
TROPONIN I SERPL HS-MCNC: 15 NG/L (ref 0–14)
WBC OTHER # BLD: 5.7 K/UL (ref 3.5–11.3)

## 2023-07-06 PROCEDURE — 6370000000 HC RX 637 (ALT 250 FOR IP): Performed by: PSYCHIATRY & NEUROLOGY

## 2023-07-06 PROCEDURE — 93005 ELECTROCARDIOGRAM TRACING: CPT

## 2023-07-06 PROCEDURE — 93010 ELECTROCARDIOGRAM REPORT: CPT | Performed by: INTERNAL MEDICINE

## 2023-07-06 PROCEDURE — 6370000000 HC RX 637 (ALT 250 FOR IP)

## 2023-07-06 PROCEDURE — 80048 BASIC METABOLIC PNL TOTAL CA: CPT

## 2023-07-06 PROCEDURE — 2500000003 HC RX 250 WO HCPCS

## 2023-07-06 PROCEDURE — 6360000002 HC RX W HCPCS

## 2023-07-06 PROCEDURE — 2580000003 HC RX 258

## 2023-07-06 PROCEDURE — 99232 SBSQ HOSP IP/OBS MODERATE 35: CPT | Performed by: PSYCHIATRY & NEUROLOGY

## 2023-07-06 PROCEDURE — 99232 SBSQ HOSP IP/OBS MODERATE 35: CPT | Performed by: INTERNAL MEDICINE

## 2023-07-06 PROCEDURE — 2060000000 HC ICU INTERMEDIATE R&B

## 2023-07-06 PROCEDURE — 94761 N-INVAS EAR/PLS OXIMETRY MLT: CPT

## 2023-07-06 PROCEDURE — 85027 COMPLETE CBC AUTOMATED: CPT

## 2023-07-06 PROCEDURE — 80307 DRUG TEST PRSMV CHEM ANLYZR: CPT

## 2023-07-06 PROCEDURE — 36415 COLL VENOUS BLD VENIPUNCTURE: CPT

## 2023-07-06 PROCEDURE — 84484 ASSAY OF TROPONIN QUANT: CPT

## 2023-07-06 PROCEDURE — 6370000000 HC RX 637 (ALT 250 FOR IP): Performed by: INTERNAL MEDICINE

## 2023-07-06 RX ORDER — CLONAZEPAM 1 MG/1
1 TABLET ORAL 2 TIMES DAILY
Status: DISCONTINUED | OUTPATIENT
Start: 2023-07-06 | End: 2023-07-10 | Stop reason: HOSPADM

## 2023-07-06 RX ORDER — ERGOCALCIFEROL 1.25 MG/1
50000 CAPSULE ORAL WEEKLY
Status: DISCONTINUED | OUTPATIENT
Start: 2023-07-06 | End: 2023-07-10 | Stop reason: HOSPADM

## 2023-07-06 RX ORDER — METOPROLOL TARTRATE 5 MG/5ML
5 INJECTION INTRAVENOUS ONCE
Status: COMPLETED | OUTPATIENT
Start: 2023-07-06 | End: 2023-07-06

## 2023-07-06 RX ORDER — KETOROLAC TROMETHAMINE 30 MG/ML
15 INJECTION, SOLUTION INTRAMUSCULAR; INTRAVENOUS ONCE
Status: COMPLETED | OUTPATIENT
Start: 2023-07-06 | End: 2023-07-06

## 2023-07-06 RX ORDER — QUETIAPINE FUMARATE 25 MG/1
25 TABLET, FILM COATED ORAL ONCE
Status: COMPLETED | OUTPATIENT
Start: 2023-07-06 | End: 2023-07-06

## 2023-07-06 RX ORDER — VITAMIN B COMPLEX
1000 TABLET ORAL DAILY
Status: DISCONTINUED | OUTPATIENT
Start: 2023-07-06 | End: 2023-07-06

## 2023-07-06 RX ADMIN — DULOXETINE HYDROCHLORIDE 60 MG: 30 CAPSULE, DELAYED RELEASE ORAL at 20:22

## 2023-07-06 RX ADMIN — SODIUM CHLORIDE, PRESERVATIVE FREE 10 ML: 5 INJECTION INTRAVENOUS at 08:11

## 2023-07-06 RX ADMIN — ACETAMINOPHEN 650 MG: 325 TABLET ORAL at 11:47

## 2023-07-06 RX ADMIN — SODIUM CHLORIDE, PRESERVATIVE FREE 10 ML: 5 INJECTION INTRAVENOUS at 20:36

## 2023-07-06 RX ADMIN — CLONAZEPAM 1 MG: 1 TABLET ORAL at 16:15

## 2023-07-06 RX ADMIN — ENOXAPARIN SODIUM 40 MG: 40 INJECTION SUBCUTANEOUS at 10:41

## 2023-07-06 RX ADMIN — DOCUSATE SODIUM 100 MG: 100 CAPSULE ORAL at 10:41

## 2023-07-06 RX ADMIN — QUETIAPINE FUMARATE 25 MG: 25 TABLET ORAL at 05:10

## 2023-07-06 RX ADMIN — KETOROLAC TROMETHAMINE 15 MG: 30 INJECTION, SOLUTION INTRAMUSCULAR; INTRAVENOUS at 23:38

## 2023-07-06 RX ADMIN — DULOXETINE HYDROCHLORIDE 60 MG: 30 CAPSULE, DELAYED RELEASE ORAL at 10:40

## 2023-07-06 RX ADMIN — QUETIAPINE FUMARATE 25 MG: 25 TABLET ORAL at 12:10

## 2023-07-06 RX ADMIN — METOPROLOL TARTRATE 5 MG: 1 INJECTION, SOLUTION INTRAVENOUS at 12:56

## 2023-07-06 RX ADMIN — ERGOCALCIFEROL 50000 UNITS: 1.25 CAPSULE ORAL at 14:17

## 2023-07-06 RX ADMIN — ACETAMINOPHEN 650 MG: 325 TABLET ORAL at 20:23

## 2023-07-06 RX ADMIN — ATORVASTATIN CALCIUM 20 MG: 20 TABLET, FILM COATED ORAL at 10:40

## 2023-07-06 RX ADMIN — BUSPIRONE HYDROCHLORIDE 15 MG: 15 TABLET ORAL at 20:22

## 2023-07-06 RX ADMIN — BUSPIRONE HYDROCHLORIDE 15 MG: 15 TABLET ORAL at 14:16

## 2023-07-06 RX ADMIN — BUSPIRONE HYDROCHLORIDE 15 MG: 15 TABLET ORAL at 10:40

## 2023-07-06 RX ADMIN — ACETAMINOPHEN 650 MG: 325 TABLET ORAL at 18:01

## 2023-07-06 RX ADMIN — PROPRANOLOL HYDROCHLORIDE 10 MG: 10 TABLET ORAL at 20:23

## 2023-07-06 ASSESSMENT — PAIN SCALES - GENERAL
PAINLEVEL_OUTOF10: 3
PAINLEVEL_OUTOF10: 0
PAINLEVEL_OUTOF10: 3
PAINLEVEL_OUTOF10: 6
PAINLEVEL_OUTOF10: 6
PAINLEVEL_OUTOF10: 0
PAINLEVEL_OUTOF10: 0

## 2023-07-06 ASSESSMENT — ENCOUNTER SYMPTOMS
WHEEZING: 0
NAUSEA: 0
ABDOMINAL PAIN: 0
DIARRHEA: 0
APNEA: 0
COUGH: 0
GASTROINTESTINAL NEGATIVE: 1
CONSTIPATION: 0
EYES NEGATIVE: 1
SHORTNESS OF BREATH: 0
SORE THROAT: 0
ABDOMINAL DISTENTION: 0
VOMITING: 0
CHEST TIGHTNESS: 0
RESPIRATORY NEGATIVE: 1
STRIDOR: 0

## 2023-07-06 ASSESSMENT — PAIN DESCRIPTION - LOCATION
LOCATION: SHOULDER

## 2023-07-06 ASSESSMENT — PAIN DESCRIPTION - ORIENTATION
ORIENTATION: LEFT

## 2023-07-06 ASSESSMENT — PAIN DESCRIPTION - DESCRIPTORS
DESCRIPTORS: ACHING

## 2023-07-06 ASSESSMENT — PAIN - FUNCTIONAL ASSESSMENT
PAIN_FUNCTIONAL_ASSESSMENT: PREVENTS OR INTERFERES WITH MANY ACTIVE NOT PASSIVE ACTIVITIES
PAIN_FUNCTIONAL_ASSESSMENT: ACTIVITIES ARE NOT PREVENTED
PAIN_FUNCTIONAL_ASSESSMENT: PREVENTS OR INTERFERES SOME ACTIVE ACTIVITIES AND ADLS
PAIN_FUNCTIONAL_ASSESSMENT: PREVENTS OR INTERFERES WITH MANY ACTIVE NOT PASSIVE ACTIVITIES

## 2023-07-06 ASSESSMENT — PAIN SCALES - WONG BAKER: WONGBAKER_NUMERICALRESPONSE: 2

## 2023-07-07 LAB
ANION GAP SERPL CALCULATED.3IONS-SCNC: 9 MMOL/L (ref 9–17)
BASOPHILS # BLD: 0.04 K/UL (ref 0–0.2)
BASOPHILS NFR BLD: 1 % (ref 0–2)
BUN SERPL-MCNC: 16 MG/DL (ref 8–23)
CALCIUM SERPL-MCNC: 9.5 MG/DL (ref 8.6–10.4)
CHLORIDE SERPL-SCNC: 107 MMOL/L (ref 98–107)
CO2 SERPL-SCNC: 20 MMOL/L (ref 20–31)
CREAT SERPL-MCNC: 0.6 MG/DL (ref 0.5–0.9)
EOSINOPHIL # BLD: 0.14 K/UL (ref 0–0.44)
EOSINOPHILS RELATIVE PERCENT: 2 % (ref 1–4)
ERYTHROCYTE [DISTWIDTH] IN BLOOD BY AUTOMATED COUNT: 13.2 % (ref 11.8–14.4)
GFR SERPL CREATININE-BSD FRML MDRD: >60 ML/MIN/1.73M2
GLUCOSE SERPL-MCNC: 111 MG/DL (ref 70–99)
HCT VFR BLD AUTO: 38.2 % (ref 36.3–47.1)
HGB BLD-MCNC: 11.6 G/DL (ref 11.9–15.1)
IMM GRANULOCYTES # BLD AUTO: <0.03 K/UL (ref 0–0.3)
IMM GRANULOCYTES NFR BLD: 0 %
LYMPHOCYTES # BLD: 37 % (ref 24–43)
LYMPHOCYTES NFR BLD: 2.17 K/UL (ref 1.1–3.7)
MCH RBC QN AUTO: 30.6 PG (ref 25.2–33.5)
MCHC RBC AUTO-ENTMCNC: 30.4 G/DL (ref 28.4–34.8)
MCV RBC AUTO: 100.8 FL (ref 82.6–102.9)
MONOCYTES NFR BLD: 0.54 K/UL (ref 0.1–1.2)
MONOCYTES NFR BLD: 9 % (ref 3–12)
NEUTROPHILS NFR BLD: 51 % (ref 36–65)
NEUTS SEG NFR BLD: 2.93 K/UL (ref 1.5–8.1)
NRBC BLD-RTO: 0 PER 100 WBC
PLATELET # BLD AUTO: 265 K/UL (ref 138–453)
PMV BLD AUTO: 9.4 FL (ref 8.1–13.5)
POTASSIUM SERPL-SCNC: 3.9 MMOL/L (ref 3.7–5.3)
RBC # BLD AUTO: 3.79 M/UL (ref 3.95–5.11)
SODIUM SERPL-SCNC: 136 MMOL/L (ref 135–144)
TROPONIN I SERPL HS-MCNC: 17 NG/L (ref 0–14)
WBC OTHER # BLD: 5.8 K/UL (ref 3.5–11.3)

## 2023-07-07 PROCEDURE — 6370000000 HC RX 637 (ALT 250 FOR IP)

## 2023-07-07 PROCEDURE — 6360000002 HC RX W HCPCS

## 2023-07-07 PROCEDURE — 2060000000 HC ICU INTERMEDIATE R&B

## 2023-07-07 PROCEDURE — 2580000003 HC RX 258

## 2023-07-07 PROCEDURE — 6370000000 HC RX 637 (ALT 250 FOR IP): Performed by: PSYCHIATRY & NEUROLOGY

## 2023-07-07 PROCEDURE — 80048 BASIC METABOLIC PNL TOTAL CA: CPT

## 2023-07-07 PROCEDURE — 84484 ASSAY OF TROPONIN QUANT: CPT

## 2023-07-07 PROCEDURE — 36415 COLL VENOUS BLD VENIPUNCTURE: CPT

## 2023-07-07 PROCEDURE — 85027 COMPLETE CBC AUTOMATED: CPT

## 2023-07-07 PROCEDURE — 99232 SBSQ HOSP IP/OBS MODERATE 35: CPT | Performed by: INTERNAL MEDICINE

## 2023-07-07 RX ORDER — QUETIAPINE FUMARATE 25 MG/1
25 TABLET, FILM COATED ORAL ONCE
Status: COMPLETED | OUTPATIENT
Start: 2023-07-07 | End: 2023-07-07

## 2023-07-07 RX ADMIN — ATORVASTATIN CALCIUM 20 MG: 20 TABLET, FILM COATED ORAL at 09:08

## 2023-07-07 RX ADMIN — ACETAMINOPHEN 650 MG: 325 TABLET ORAL at 11:34

## 2023-07-07 RX ADMIN — DOCUSATE SODIUM 100 MG: 100 CAPSULE ORAL at 09:08

## 2023-07-07 RX ADMIN — ACETAMINOPHEN 650 MG: 325 TABLET ORAL at 03:06

## 2023-07-07 RX ADMIN — ACETAMINOPHEN 650 MG: 325 TABLET ORAL at 20:22

## 2023-07-07 RX ADMIN — BUSPIRONE HYDROCHLORIDE 15 MG: 15 TABLET ORAL at 15:32

## 2023-07-07 RX ADMIN — SODIUM CHLORIDE, PRESERVATIVE FREE 10 ML: 5 INJECTION INTRAVENOUS at 09:10

## 2023-07-07 RX ADMIN — CLONAZEPAM 1 MG: 1 TABLET ORAL at 09:08

## 2023-07-07 RX ADMIN — PROPRANOLOL HYDROCHLORIDE 10 MG: 10 TABLET ORAL at 20:22

## 2023-07-07 RX ADMIN — DULOXETINE HYDROCHLORIDE 60 MG: 30 CAPSULE, DELAYED RELEASE ORAL at 20:22

## 2023-07-07 RX ADMIN — QUETIAPINE FUMARATE 25 MG: 25 TABLET ORAL at 20:22

## 2023-07-07 RX ADMIN — ENOXAPARIN SODIUM 40 MG: 40 INJECTION SUBCUTANEOUS at 09:08

## 2023-07-07 RX ADMIN — CLONAZEPAM 1 MG: 1 TABLET ORAL at 20:22

## 2023-07-07 RX ADMIN — SODIUM CHLORIDE, PRESERVATIVE FREE 10 ML: 5 INJECTION INTRAVENOUS at 20:27

## 2023-07-07 RX ADMIN — PROPRANOLOL HYDROCHLORIDE 10 MG: 10 TABLET ORAL at 09:09

## 2023-07-07 RX ADMIN — BUSPIRONE HYDROCHLORIDE 15 MG: 15 TABLET ORAL at 09:08

## 2023-07-07 RX ADMIN — BUSPIRONE HYDROCHLORIDE 15 MG: 15 TABLET ORAL at 20:22

## 2023-07-07 RX ADMIN — DULOXETINE HYDROCHLORIDE 60 MG: 30 CAPSULE, DELAYED RELEASE ORAL at 09:09

## 2023-07-07 RX ADMIN — POLYETHYLENE GLYCOL 3350 17 G: 17 POWDER, FOR SOLUTION ORAL at 18:10

## 2023-07-07 ASSESSMENT — PAIN DESCRIPTION - LOCATION
LOCATION: SHOULDER

## 2023-07-07 ASSESSMENT — PAIN DESCRIPTION - DESCRIPTORS
DESCRIPTORS: DISCOMFORT
DESCRIPTORS: ACHING
DESCRIPTORS: ACHING

## 2023-07-07 ASSESSMENT — PAIN SCALES - GENERAL
PAINLEVEL_OUTOF10: 3
PAINLEVEL_OUTOF10: 0
PAINLEVEL_OUTOF10: 0
PAINLEVEL_OUTOF10: 3
PAINLEVEL_OUTOF10: 0
PAINLEVEL_OUTOF10: 2
PAINLEVEL_OUTOF10: 1
PAINLEVEL_OUTOF10: 3
PAINLEVEL_OUTOF10: 0
PAINLEVEL_OUTOF10: 3

## 2023-07-07 ASSESSMENT — PAIN DESCRIPTION - ORIENTATION
ORIENTATION: LEFT

## 2023-07-07 ASSESSMENT — PAIN - FUNCTIONAL ASSESSMENT
PAIN_FUNCTIONAL_ASSESSMENT: PREVENTS OR INTERFERES WITH MANY ACTIVE NOT PASSIVE ACTIVITIES
PAIN_FUNCTIONAL_ASSESSMENT: ACTIVITIES ARE NOT PREVENTED
PAIN_FUNCTIONAL_ASSESSMENT: PREVENTS OR INTERFERES WITH MANY ACTIVE NOT PASSIVE ACTIVITIES

## 2023-07-08 ENCOUNTER — APPOINTMENT (OUTPATIENT)
Dept: CT IMAGING | Age: 83
DRG: 880 | End: 2023-07-08
Payer: MEDICARE

## 2023-07-08 PROBLEM — G20.C PARKINSONISM: Status: RESOLVED | Noted: 2023-07-04 | Resolved: 2023-07-08

## 2023-07-08 PROBLEM — R10.9 ABDOMINAL PAIN: Status: ACTIVE | Noted: 2023-07-08

## 2023-07-08 PROBLEM — R41.0 DELIRIUM: Status: RESOLVED | Noted: 2023-07-03 | Resolved: 2023-07-08

## 2023-07-08 PROBLEM — N39.0 UTI (URINARY TRACT INFECTION): Status: RESOLVED | Noted: 2023-07-02 | Resolved: 2023-07-08

## 2023-07-08 PROBLEM — R25.1 TREMOR: Status: RESOLVED | Noted: 2023-07-03 | Resolved: 2023-07-08

## 2023-07-08 PROBLEM — F41.1 ANXIETY STATE: Status: RESOLVED | Noted: 2023-07-02 | Resolved: 2023-07-08

## 2023-07-08 PROBLEM — G20 PARKINSONISM (HCC): Status: RESOLVED | Noted: 2023-07-04 | Resolved: 2023-07-08

## 2023-07-08 PROBLEM — G25.2 ACTION TREMOR: Status: RESOLVED | Noted: 2023-07-04 | Resolved: 2023-07-08

## 2023-07-08 PROBLEM — R29.898 RIGIDITY: Status: ACTIVE | Noted: 2023-07-08

## 2023-07-08 PROBLEM — N17.9 AKI (ACUTE KIDNEY INJURY) (HCC): Status: ACTIVE | Noted: 2023-07-08

## 2023-07-08 LAB
ALBUMIN SERPL-MCNC: 3.3 G/DL (ref 3.5–5.2)
ALBUMIN/GLOB SERPL: 1.2 {RATIO} (ref 1–2.5)
ALP SERPL-CCNC: 68 U/L (ref 35–104)
ALT SERPL-CCNC: 23 U/L (ref 5–33)
ANION GAP SERPL CALCULATED.3IONS-SCNC: 10 MMOL/L (ref 9–17)
ANION GAP SERPL CALCULATED.3IONS-SCNC: 15 MMOL/L (ref 9–17)
AST SERPL-CCNC: 30 U/L
BASOPHILS # BLD: 0 K/UL (ref 0–0.2)
BASOPHILS NFR BLD: 0 % (ref 0–2)
BILIRUB DIRECT SERPL-MCNC: 0.2 MG/DL
BILIRUB INDIRECT SERPL-MCNC: 0.5 MG/DL (ref 0–1)
BILIRUB SERPL-MCNC: 0.7 MG/DL (ref 0.3–1.2)
BUN SERPL-MCNC: 28 MG/DL (ref 8–23)
BUN SERPL-MCNC: 30 MG/DL (ref 8–23)
CALCIUM SERPL-MCNC: 10.3 MG/DL (ref 8.6–10.4)
CALCIUM SERPL-MCNC: 9.8 MG/DL (ref 8.6–10.4)
CHLORIDE SERPL-SCNC: 100 MMOL/L (ref 98–107)
CHLORIDE SERPL-SCNC: 99 MMOL/L (ref 98–107)
CO2 SERPL-SCNC: 21 MMOL/L (ref 20–31)
CO2 SERPL-SCNC: 22 MMOL/L (ref 20–31)
CREAT SERPL-MCNC: 1.25 MG/DL (ref 0.5–0.9)
CREAT SERPL-MCNC: 1.4 MG/DL (ref 0.5–0.9)
EOSINOPHIL # BLD: 0 K/UL (ref 0–0.4)
EOSINOPHILS RELATIVE PERCENT: 0 % (ref 1–4)
ERYTHROCYTE [DISTWIDTH] IN BLOOD BY AUTOMATED COUNT: 13.2 % (ref 11.8–14.4)
GFR SERPL CREATININE-BSD FRML MDRD: 38 ML/MIN/1.73M2
GFR SERPL CREATININE-BSD FRML MDRD: 43 ML/MIN/1.73M2
GLUCOSE SERPL-MCNC: 120 MG/DL (ref 70–99)
GLUCOSE SERPL-MCNC: 180 MG/DL (ref 70–99)
HCT VFR BLD AUTO: 41.4 % (ref 36.3–47.1)
HCT VFR BLD AUTO: 43.4 % (ref 36.3–47.1)
HGB BLD-MCNC: 13.4 G/DL (ref 11.9–15.1)
HGB BLD-MCNC: 14.3 G/DL (ref 11.9–15.1)
IMM GRANULOCYTES # BLD AUTO: 0.57 K/UL (ref 0–0.3)
IMM GRANULOCYTES NFR BLD: 3 %
LACTIC ACID, WHOLE BLOOD: 1.5 MMOL/L (ref 0.7–2.1)
LYMPHOCYTES # BLD: 8 % (ref 24–44)
LYMPHOCYTES NFR BLD: 1.51 K/UL (ref 1–4.8)
MCH RBC QN AUTO: 31 PG (ref 25.2–33.5)
MCHC RBC AUTO-ENTMCNC: 32.9 G/DL (ref 28.4–34.8)
MCV RBC AUTO: 94.1 FL (ref 82.6–102.9)
MONOCYTES NFR BLD: 1.32 K/UL (ref 0.1–0.8)
MONOCYTES NFR BLD: 7 % (ref 1–7)
MORPHOLOGY: NORMAL
NEUTROPHILS NFR BLD: 82 % (ref 36–66)
NEUTS SEG NFR BLD: 15.5 K/UL (ref 1.8–7.7)
NRBC BLD-RTO: 0 PER 100 WBC
PLATELET # BLD AUTO: 276 K/UL (ref 138–453)
PMV BLD AUTO: 9.4 FL (ref 8.1–13.5)
POTASSIUM SERPL-SCNC: 3.6 MMOL/L (ref 3.7–5.3)
POTASSIUM SERPL-SCNC: 4.9 MMOL/L (ref 3.7–5.3)
PROT SERPL-MCNC: 6 G/DL (ref 6.4–8.3)
RBC # BLD AUTO: 4.61 M/UL (ref 3.95–5.11)
SODIUM SERPL-SCNC: 132 MMOL/L (ref 135–144)
SODIUM SERPL-SCNC: 135 MMOL/L (ref 135–144)
WBC OTHER # BLD: 18.9 K/UL (ref 3.5–11.3)

## 2023-07-08 PROCEDURE — 80076 HEPATIC FUNCTION PANEL: CPT

## 2023-07-08 PROCEDURE — 6370000000 HC RX 637 (ALT 250 FOR IP): Performed by: PSYCHIATRY & NEUROLOGY

## 2023-07-08 PROCEDURE — 85018 HEMOGLOBIN: CPT

## 2023-07-08 PROCEDURE — 6370000000 HC RX 637 (ALT 250 FOR IP)

## 2023-07-08 PROCEDURE — 36415 COLL VENOUS BLD VENIPUNCTURE: CPT

## 2023-07-08 PROCEDURE — 2580000003 HC RX 258

## 2023-07-08 PROCEDURE — 99232 SBSQ HOSP IP/OBS MODERATE 35: CPT | Performed by: INTERNAL MEDICINE

## 2023-07-08 PROCEDURE — 6360000002 HC RX W HCPCS

## 2023-07-08 PROCEDURE — 51798 US URINE CAPACITY MEASURE: CPT

## 2023-07-08 PROCEDURE — 85027 COMPLETE CBC AUTOMATED: CPT

## 2023-07-08 PROCEDURE — 85014 HEMATOCRIT: CPT

## 2023-07-08 PROCEDURE — 74176 CT ABD & PELVIS W/O CONTRAST: CPT

## 2023-07-08 PROCEDURE — 2580000003 HC RX 258: Performed by: INTERNAL MEDICINE

## 2023-07-08 PROCEDURE — 2060000000 HC ICU INTERMEDIATE R&B

## 2023-07-08 PROCEDURE — 80048 BASIC METABOLIC PNL TOTAL CA: CPT

## 2023-07-08 PROCEDURE — 94761 N-INVAS EAR/PLS OXIMETRY MLT: CPT

## 2023-07-08 PROCEDURE — 83605 ASSAY OF LACTIC ACID: CPT

## 2023-07-08 PROCEDURE — 2700000000 HC OXYGEN THERAPY PER DAY

## 2023-07-08 RX ORDER — POTASSIUM CHLORIDE 20 MEQ/1
40 TABLET, EXTENDED RELEASE ORAL ONCE
Status: COMPLETED | OUTPATIENT
Start: 2023-07-08 | End: 2023-07-08

## 2023-07-08 RX ORDER — METRONIDAZOLE 500 MG/100ML
500 INJECTION, SOLUTION INTRAVENOUS EVERY 8 HOURS
Status: DISCONTINUED | OUTPATIENT
Start: 2023-07-08 | End: 2023-07-10 | Stop reason: HOSPADM

## 2023-07-08 RX ORDER — SODIUM CHLORIDE 9 MG/ML
INJECTION, SOLUTION INTRAVENOUS CONTINUOUS
Status: DISCONTINUED | OUTPATIENT
Start: 2023-07-08 | End: 2023-07-09

## 2023-07-08 RX ORDER — CIPROFLOXACIN 2 MG/ML
400 INJECTION, SOLUTION INTRAVENOUS EVERY 12 HOURS
Status: DISCONTINUED | OUTPATIENT
Start: 2023-07-08 | End: 2023-07-10 | Stop reason: HOSPADM

## 2023-07-08 RX ORDER — 0.9 % SODIUM CHLORIDE 0.9 %
500 INTRAVENOUS SOLUTION INTRAVENOUS ONCE
Status: COMPLETED | OUTPATIENT
Start: 2023-07-08 | End: 2023-07-08

## 2023-07-08 RX ADMIN — DULOXETINE HYDROCHLORIDE 60 MG: 30 CAPSULE, DELAYED RELEASE ORAL at 20:56

## 2023-07-08 RX ADMIN — POTASSIUM CHLORIDE 40 MEQ: 1500 TABLET, EXTENDED RELEASE ORAL at 08:11

## 2023-07-08 RX ADMIN — METRONIDAZOLE 500 MG: 500 INJECTION, SOLUTION INTRAVENOUS at 15:12

## 2023-07-08 RX ADMIN — SODIUM CHLORIDE 500 ML: 9 INJECTION, SOLUTION INTRAVENOUS at 17:33

## 2023-07-08 RX ADMIN — CIPROFLOXACIN 400 MG: 400 INJECTION, SOLUTION INTRAVENOUS at 13:54

## 2023-07-08 RX ADMIN — BUSPIRONE HYDROCHLORIDE 15 MG: 15 TABLET ORAL at 20:55

## 2023-07-08 RX ADMIN — PROPRANOLOL HYDROCHLORIDE 10 MG: 10 TABLET ORAL at 08:13

## 2023-07-08 RX ADMIN — BUSPIRONE HYDROCHLORIDE 15 MG: 15 TABLET ORAL at 08:07

## 2023-07-08 RX ADMIN — SODIUM CHLORIDE, PRESERVATIVE FREE 10 ML: 5 INJECTION INTRAVENOUS at 08:13

## 2023-07-08 RX ADMIN — SODIUM CHLORIDE: 9 INJECTION, SOLUTION INTRAVENOUS at 08:16

## 2023-07-08 RX ADMIN — DULOXETINE HYDROCHLORIDE 60 MG: 30 CAPSULE, DELAYED RELEASE ORAL at 08:11

## 2023-07-08 RX ADMIN — CLONAZEPAM 1 MG: 1 TABLET ORAL at 20:56

## 2023-07-08 RX ADMIN — ENOXAPARIN SODIUM 40 MG: 40 INJECTION SUBCUTANEOUS at 08:12

## 2023-07-08 RX ADMIN — BUSPIRONE HYDROCHLORIDE 15 MG: 15 TABLET ORAL at 13:54

## 2023-07-08 RX ADMIN — METRONIDAZOLE 500 MG: 500 INJECTION, SOLUTION INTRAVENOUS at 21:29

## 2023-07-08 RX ADMIN — ATORVASTATIN CALCIUM 20 MG: 20 TABLET, FILM COATED ORAL at 08:13

## 2023-07-08 RX ADMIN — PROPRANOLOL HYDROCHLORIDE 10 MG: 10 TABLET ORAL at 20:58

## 2023-07-08 RX ADMIN — CLONAZEPAM 1 MG: 1 TABLET ORAL at 08:11

## 2023-07-08 ASSESSMENT — ENCOUNTER SYMPTOMS
ABDOMINAL PAIN: 1
COUGH: 0
NAUSEA: 0
SORE THROAT: 0
ABDOMINAL DISTENTION: 0
CONSTIPATION: 0
WHEEZING: 0
CHOKING: 0
BACK PAIN: 0
TROUBLE SWALLOWING: 0
CHEST TIGHTNESS: 0
SHORTNESS OF BREATH: 0
VOMITING: 0
DIARRHEA: 0

## 2023-07-09 ENCOUNTER — APPOINTMENT (OUTPATIENT)
Dept: ULTRASOUND IMAGING | Age: 83
DRG: 880 | End: 2023-07-09
Payer: MEDICARE

## 2023-07-09 PROBLEM — N17.9 AKI (ACUTE KIDNEY INJURY) (HCC): Status: RESOLVED | Noted: 2023-07-08 | Resolved: 2023-07-09

## 2023-07-09 PROBLEM — K52.9 COLITIS: Status: ACTIVE | Noted: 2023-07-09

## 2023-07-09 PROBLEM — N39.0 URINARY TRACT INFECTION WITHOUT HEMATURIA: Status: RESOLVED | Noted: 2023-07-02 | Resolved: 2023-07-09

## 2023-07-09 LAB
ANION GAP SERPL CALCULATED.3IONS-SCNC: 10 MMOL/L (ref 9–17)
BASOPHILS # BLD: 0 K/UL (ref 0–0.2)
BASOPHILS NFR BLD: 0 % (ref 0–2)
BILIRUB UR QL STRIP: NEGATIVE
BUN SERPL-MCNC: 21 MG/DL (ref 8–23)
CALCIUM SERPL-MCNC: 9.1 MG/DL (ref 8.6–10.4)
CASTS #/AREA URNS LPF: ABNORMAL /LPF (ref 0–8)
CHLORIDE SERPL-SCNC: 106 MMOL/L (ref 98–107)
CLARITY UR: CLEAR
CO2 SERPL-SCNC: 19 MMOL/L (ref 20–31)
COLOR UR: ABNORMAL
CREAT SERPL-MCNC: 0.76 MG/DL (ref 0.5–0.9)
CREAT UR-MCNC: 42.9 MG/DL (ref 28–217)
EOSINOPHIL # BLD: 0.14 K/UL (ref 0–0.4)
EOSINOPHILS RELATIVE PERCENT: 1 % (ref 1–4)
EPI CELLS #/AREA URNS HPF: ABNORMAL /HPF (ref 0–5)
ERYTHROCYTE [DISTWIDTH] IN BLOOD BY AUTOMATED COUNT: 13.5 % (ref 11.8–14.4)
GFR SERPL CREATININE-BSD FRML MDRD: >60 ML/MIN/1.73M2
GLUCOSE SERPL-MCNC: 104 MG/DL (ref 70–99)
GLUCOSE UR STRIP-MCNC: NEGATIVE MG/DL
HCT VFR BLD AUTO: 34.8 % (ref 36.3–47.1)
HGB BLD-MCNC: 11.3 G/DL (ref 11.9–15.1)
HGB UR QL STRIP.AUTO: NEGATIVE
IMM GRANULOCYTES # BLD AUTO: 0 K/UL (ref 0–0.3)
IMM GRANULOCYTES NFR BLD: 0 %
KETONES UR STRIP-MCNC: NEGATIVE MG/DL
LEUKOCYTE ESTERASE UR QL STRIP: NEGATIVE
LYMPHOCYTES # BLD: 13 % (ref 24–44)
LYMPHOCYTES NFR BLD: 1.76 K/UL (ref 1–4.8)
MCH RBC QN AUTO: 30.5 PG (ref 25.2–33.5)
MCHC RBC AUTO-ENTMCNC: 32.5 G/DL (ref 28.4–34.8)
MCV RBC AUTO: 94.1 FL (ref 82.6–102.9)
MONOCYTES NFR BLD: 1.08 K/UL (ref 0.1–0.8)
MONOCYTES NFR BLD: 8 % (ref 1–7)
MORPHOLOGY: NORMAL
NEUTROPHILS NFR BLD: 78 % (ref 36–66)
NEUTS SEG NFR BLD: 10.52 K/UL (ref 1.8–7.7)
NITRITE UR QL STRIP: NEGATIVE
NRBC BLD-RTO: 0 PER 100 WBC
PH UR STRIP: 5 [PH] (ref 5–8)
PLATELET # BLD AUTO: 248 K/UL (ref 138–453)
PMV BLD AUTO: 9.7 FL (ref 8.1–13.5)
POTASSIUM SERPL-SCNC: 4.5 MMOL/L (ref 3.7–5.3)
PROT UR STRIP-MCNC: NEGATIVE MG/DL
RBC # BLD AUTO: 3.7 M/UL (ref 3.95–5.11)
RBC #/AREA URNS HPF: ABNORMAL /HPF (ref 0–4)
SODIUM SERPL-SCNC: 135 MMOL/L (ref 135–144)
SODIUM UR-SCNC: <20 MMOL/L
SP GR UR STRIP: 1.01 (ref 1–1.03)
UROBILINOGEN UR STRIP-ACNC: NORMAL
WBC #/AREA URNS HPF: ABNORMAL /HPF (ref 0–5)
WBC OTHER # BLD: 13.5 K/UL (ref 3.5–11.3)

## 2023-07-09 PROCEDURE — 6370000000 HC RX 637 (ALT 250 FOR IP): Performed by: PSYCHIATRY & NEUROLOGY

## 2023-07-09 PROCEDURE — 51798 US URINE CAPACITY MEASURE: CPT

## 2023-07-09 PROCEDURE — 6370000000 HC RX 637 (ALT 250 FOR IP)

## 2023-07-09 PROCEDURE — 80048 BASIC METABOLIC PNL TOTAL CA: CPT

## 2023-07-09 PROCEDURE — 81001 URINALYSIS AUTO W/SCOPE: CPT

## 2023-07-09 PROCEDURE — 84300 ASSAY OF URINE SODIUM: CPT

## 2023-07-09 PROCEDURE — 85027 COMPLETE CBC AUTOMATED: CPT

## 2023-07-09 PROCEDURE — 76705 ECHO EXAM OF ABDOMEN: CPT

## 2023-07-09 PROCEDURE — 2580000003 HC RX 258

## 2023-07-09 PROCEDURE — 51701 INSERT BLADDER CATHETER: CPT

## 2023-07-09 PROCEDURE — 36415 COLL VENOUS BLD VENIPUNCTURE: CPT

## 2023-07-09 PROCEDURE — 6360000002 HC RX W HCPCS

## 2023-07-09 PROCEDURE — 99232 SBSQ HOSP IP/OBS MODERATE 35: CPT | Performed by: INTERNAL MEDICINE

## 2023-07-09 PROCEDURE — 2060000000 HC ICU INTERMEDIATE R&B

## 2023-07-09 PROCEDURE — 82570 ASSAY OF URINE CREATININE: CPT

## 2023-07-09 RX ORDER — CIPROFLOXACIN 500 MG/1
500 TABLET, FILM COATED ORAL 2 TIMES DAILY
Qty: 14 TABLET | Refills: 0 | DISCHARGE
Start: 2023-07-09 | End: 2023-07-16

## 2023-07-09 RX ORDER — ATORVASTATIN CALCIUM 20 MG/1
20 TABLET, FILM COATED ORAL DAILY
Qty: 90 TABLET | Refills: 1 | DISCHARGE
Start: 2023-07-09

## 2023-07-09 RX ORDER — QUETIAPINE FUMARATE 25 MG/1
25 TABLET, FILM COATED ORAL EVERY 12 HOURS PRN
Status: DISCONTINUED | OUTPATIENT
Start: 2023-07-09 | End: 2023-07-10 | Stop reason: HOSPADM

## 2023-07-09 RX ORDER — BUSPIRONE HYDROCHLORIDE 15 MG/1
15 TABLET ORAL 3 TIMES DAILY
Qty: 60 TABLET | Refills: 1 | DISCHARGE
Start: 2023-07-09

## 2023-07-09 RX ORDER — SODIUM CHLORIDE, SODIUM LACTATE, POTASSIUM CHLORIDE, CALCIUM CHLORIDE 600; 310; 30; 20 MG/100ML; MG/100ML; MG/100ML; MG/100ML
INJECTION, SOLUTION INTRAVENOUS CONTINUOUS
Status: DISCONTINUED | OUTPATIENT
Start: 2023-07-09 | End: 2023-07-10 | Stop reason: HOSPADM

## 2023-07-09 RX ORDER — METRONIDAZOLE 500 MG/1
500 TABLET ORAL 2 TIMES DAILY
Qty: 14 TABLET | Refills: 0 | DISCHARGE
Start: 2023-07-09 | End: 2023-07-16

## 2023-07-09 RX ORDER — PROPRANOLOL HYDROCHLORIDE 10 MG/1
10 TABLET ORAL 2 TIMES DAILY
Qty: 90 TABLET | Refills: 3 | DISCHARGE
Start: 2023-07-09

## 2023-07-09 RX ORDER — DULOXETIN HYDROCHLORIDE 60 MG/1
60 CAPSULE, DELAYED RELEASE ORAL 2 TIMES DAILY
Qty: 30 CAPSULE | Refills: 3 | DISCHARGE
Start: 2023-07-09

## 2023-07-09 RX ORDER — QUETIAPINE FUMARATE 25 MG/1
25 TABLET, FILM COATED ORAL DAILY PRN
Qty: 30 TABLET | Refills: 0 | DISCHARGE
Start: 2023-07-09 | End: 2023-08-08

## 2023-07-09 RX ORDER — CLONAZEPAM 1 MG/1
1 TABLET ORAL 2 TIMES DAILY PRN
Qty: 60 TABLET | Refills: 0 | Status: SHIPPED | OUTPATIENT
Start: 2023-07-09 | End: 2023-08-08

## 2023-07-09 RX ADMIN — ENOXAPARIN SODIUM 40 MG: 40 INJECTION SUBCUTANEOUS at 10:05

## 2023-07-09 RX ADMIN — ACETAMINOPHEN 650 MG: 325 TABLET ORAL at 16:23

## 2023-07-09 RX ADMIN — SODIUM CHLORIDE, PRESERVATIVE FREE 10 ML: 5 INJECTION INTRAVENOUS at 10:08

## 2023-07-09 RX ADMIN — CLONAZEPAM 1 MG: 1 TABLET ORAL at 10:04

## 2023-07-09 RX ADMIN — METRONIDAZOLE 500 MG: 500 INJECTION, SOLUTION INTRAVENOUS at 05:26

## 2023-07-09 RX ADMIN — BUSPIRONE HYDROCHLORIDE 15 MG: 15 TABLET ORAL at 20:50

## 2023-07-09 RX ADMIN — BUSPIRONE HYDROCHLORIDE 15 MG: 15 TABLET ORAL at 14:43

## 2023-07-09 RX ADMIN — ATORVASTATIN CALCIUM 20 MG: 20 TABLET, FILM COATED ORAL at 10:04

## 2023-07-09 RX ADMIN — METRONIDAZOLE 500 MG: 500 INJECTION, SOLUTION INTRAVENOUS at 14:44

## 2023-07-09 RX ADMIN — PROPRANOLOL HYDROCHLORIDE 10 MG: 10 TABLET ORAL at 10:05

## 2023-07-09 RX ADMIN — DULOXETINE HYDROCHLORIDE 60 MG: 30 CAPSULE, DELAYED RELEASE ORAL at 20:50

## 2023-07-09 RX ADMIN — DULOXETINE HYDROCHLORIDE 60 MG: 30 CAPSULE, DELAYED RELEASE ORAL at 10:04

## 2023-07-09 RX ADMIN — CLONAZEPAM 1 MG: 1 TABLET ORAL at 20:50

## 2023-07-09 RX ADMIN — SODIUM CHLORIDE, POTASSIUM CHLORIDE, SODIUM LACTATE AND CALCIUM CHLORIDE: 600; 310; 30; 20 INJECTION, SOLUTION INTRAVENOUS at 11:45

## 2023-07-09 RX ADMIN — CIPROFLOXACIN 400 MG: 400 INJECTION, SOLUTION INTRAVENOUS at 16:12

## 2023-07-09 RX ADMIN — CIPROFLOXACIN 400 MG: 400 INJECTION, SOLUTION INTRAVENOUS at 01:23

## 2023-07-09 RX ADMIN — BUSPIRONE HYDROCHLORIDE 15 MG: 15 TABLET ORAL at 10:04

## 2023-07-09 RX ADMIN — METRONIDAZOLE 500 MG: 500 INJECTION, SOLUTION INTRAVENOUS at 22:42

## 2023-07-09 ASSESSMENT — ENCOUNTER SYMPTOMS
CHOKING: 0
DIARRHEA: 1
SHORTNESS OF BREATH: 0
ABDOMINAL PAIN: 1
TROUBLE SWALLOWING: 0
ABDOMINAL DISTENTION: 0
CONSTIPATION: 0
NAUSEA: 0
WHEEZING: 0
COUGH: 0
CHEST TIGHTNESS: 0
VOMITING: 0
SORE THROAT: 0
BACK PAIN: 0

## 2023-07-09 ASSESSMENT — PAIN DESCRIPTION - DESCRIPTORS: DESCRIPTORS: ACHING

## 2023-07-09 ASSESSMENT — PAIN DESCRIPTION - LOCATION: LOCATION: NECK

## 2023-07-09 ASSESSMENT — PAIN DESCRIPTION - ORIENTATION: ORIENTATION: POSTERIOR

## 2023-07-09 ASSESSMENT — PAIN SCALES - GENERAL
PAINLEVEL_OUTOF10: 3
PAINLEVEL_OUTOF10: 0
PAINLEVEL_OUTOF10: 0

## 2023-07-10 VITALS
HEART RATE: 94 BPM | BODY MASS INDEX: 20.7 KG/M2 | WEIGHT: 120.59 LBS | TEMPERATURE: 97.8 F | RESPIRATION RATE: 16 BRPM | SYSTOLIC BLOOD PRESSURE: 136 MMHG | DIASTOLIC BLOOD PRESSURE: 77 MMHG | OXYGEN SATURATION: 97 %

## 2023-07-10 PROBLEM — R33.8 ACUTE URINARY RETENTION: Status: ACTIVE | Noted: 2023-07-10

## 2023-07-10 LAB
ANION GAP SERPL CALCULATED.3IONS-SCNC: 8 MMOL/L (ref 9–17)
BASOPHILS # BLD: <0.03 K/UL (ref 0–0.2)
BASOPHILS NFR BLD: 0 % (ref 0–2)
BILIRUB UR QL STRIP: NEGATIVE
BUN SERPL-MCNC: 10 MG/DL (ref 8–23)
CALCIUM SERPL-MCNC: 9.1 MG/DL (ref 8.6–10.4)
CHLORIDE SERPL-SCNC: 106 MMOL/L (ref 98–107)
CLARITY UR: CLEAR
CO2 SERPL-SCNC: 22 MMOL/L (ref 20–31)
COLOR UR: YELLOW
CREAT SERPL-MCNC: 0.6 MG/DL (ref 0.5–0.9)
EOSINOPHIL # BLD: 0.07 K/UL (ref 0–0.44)
EOSINOPHILS RELATIVE PERCENT: 1 % (ref 1–4)
EPI CELLS #/AREA URNS HPF: NORMAL /HPF (ref 0–5)
ERYTHROCYTE [DISTWIDTH] IN BLOOD BY AUTOMATED COUNT: 13.6 % (ref 11.8–14.4)
GFR SERPL CREATININE-BSD FRML MDRD: >60 ML/MIN/1.73M2
GLUCOSE SERPL-MCNC: 87 MG/DL (ref 70–99)
GLUCOSE UR STRIP-MCNC: NEGATIVE MG/DL
HCT VFR BLD AUTO: 32.5 % (ref 36.3–47.1)
HGB BLD-MCNC: 10.4 G/DL (ref 11.9–15.1)
HGB UR QL STRIP.AUTO: NEGATIVE
IMM GRANULOCYTES # BLD AUTO: 0.03 K/UL (ref 0–0.3)
IMM GRANULOCYTES NFR BLD: 0 %
KETONES UR STRIP-MCNC: NEGATIVE MG/DL
LEUKOCYTE ESTERASE UR QL STRIP: ABNORMAL
LYMPHOCYTES # BLD: 15 % (ref 24–43)
LYMPHOCYTES NFR BLD: 1.33 K/UL (ref 1.1–3.7)
MCH RBC QN AUTO: 31.6 PG (ref 25.2–33.5)
MCHC RBC AUTO-ENTMCNC: 32 G/DL (ref 28.4–34.8)
MCV RBC AUTO: 98.8 FL (ref 82.6–102.9)
MONOCYTES NFR BLD: 0.63 K/UL (ref 0.1–1.2)
MONOCYTES NFR BLD: 7 % (ref 3–12)
NEUTROPHILS NFR BLD: 77 % (ref 36–65)
NEUTS SEG NFR BLD: 6.99 K/UL (ref 1.5–8.1)
NITRITE UR QL STRIP: NEGATIVE
NRBC BLD-RTO: 0 PER 100 WBC
PH UR STRIP: 5 [PH] (ref 5–8)
PLATELET # BLD AUTO: 243 K/UL (ref 138–453)
PMV BLD AUTO: 9.7 FL (ref 8.1–13.5)
POTASSIUM SERPL-SCNC: 4.2 MMOL/L (ref 3.7–5.3)
PROT UR STRIP-MCNC: NEGATIVE MG/DL
RBC # BLD AUTO: 3.29 M/UL (ref 3.95–5.11)
RBC #/AREA URNS HPF: NORMAL /HPF (ref 0–4)
SODIUM SERPL-SCNC: 136 MMOL/L (ref 135–144)
SP GR UR STRIP: 1.01 (ref 1–1.03)
UROBILINOGEN UR STRIP-ACNC: NORMAL
WBC #/AREA URNS HPF: NORMAL /HPF (ref 0–5)
WBC OTHER # BLD: 9.1 K/UL (ref 3.5–11.3)

## 2023-07-10 PROCEDURE — 6370000000 HC RX 637 (ALT 250 FOR IP)

## 2023-07-10 PROCEDURE — 99239 HOSP IP/OBS DSCHRG MGMT >30: CPT | Performed by: INTERNAL MEDICINE

## 2023-07-10 PROCEDURE — 6360000002 HC RX W HCPCS

## 2023-07-10 PROCEDURE — 6370000000 HC RX 637 (ALT 250 FOR IP): Performed by: PSYCHIATRY & NEUROLOGY

## 2023-07-10 PROCEDURE — 36415 COLL VENOUS BLD VENIPUNCTURE: CPT

## 2023-07-10 PROCEDURE — 85027 COMPLETE CBC AUTOMATED: CPT

## 2023-07-10 PROCEDURE — 80048 BASIC METABOLIC PNL TOTAL CA: CPT

## 2023-07-10 PROCEDURE — 81001 URINALYSIS AUTO W/SCOPE: CPT

## 2023-07-10 RX ADMIN — ATORVASTATIN CALCIUM 20 MG: 20 TABLET, FILM COATED ORAL at 08:24

## 2023-07-10 RX ADMIN — ACETAMINOPHEN 650 MG: 325 TABLET ORAL at 08:27

## 2023-07-10 RX ADMIN — ENOXAPARIN SODIUM 40 MG: 40 INJECTION SUBCUTANEOUS at 08:24

## 2023-07-10 RX ADMIN — PROPRANOLOL HYDROCHLORIDE 10 MG: 10 TABLET ORAL at 08:24

## 2023-07-10 RX ADMIN — CIPROFLOXACIN 400 MG: 400 INJECTION, SOLUTION INTRAVENOUS at 01:52

## 2023-07-10 RX ADMIN — BUSPIRONE HYDROCHLORIDE 15 MG: 15 TABLET ORAL at 08:24

## 2023-07-10 RX ADMIN — METRONIDAZOLE 500 MG: 500 INJECTION, SOLUTION INTRAVENOUS at 06:37

## 2023-07-10 RX ADMIN — CLONAZEPAM 1 MG: 1 TABLET ORAL at 08:24

## 2023-07-10 RX ADMIN — DULOXETINE HYDROCHLORIDE 60 MG: 30 CAPSULE, DELAYED RELEASE ORAL at 08:24

## 2023-07-10 ASSESSMENT — PAIN SCALES - GENERAL
PAINLEVEL_OUTOF10: 1
PAINLEVEL_OUTOF10: 4

## 2023-07-10 ASSESSMENT — ENCOUNTER SYMPTOMS
BACK PAIN: 0
ABDOMINAL DISTENTION: 0
COUGH: 0
CHEST TIGHTNESS: 0
VOMITING: 0
SHORTNESS OF BREATH: 0
SORE THROAT: 0
NAUSEA: 0
DIARRHEA: 0
CONSTIPATION: 0
CHOKING: 0
WHEEZING: 0
ABDOMINAL PAIN: 0
TROUBLE SWALLOWING: 0

## 2023-07-10 ASSESSMENT — PAIN SCALES - WONG BAKER: WONGBAKER_NUMERICALRESPONSE: 0

## 2023-07-10 ASSESSMENT — PAIN DESCRIPTION - DESCRIPTORS: DESCRIPTORS: DISCOMFORT

## 2023-07-10 ASSESSMENT — PAIN DESCRIPTION - LOCATION: LOCATION: NECK;SHOULDER

## 2023-07-10 NOTE — PROGRESS NOTES
Attempted to call report to facility x 2. With no answer. Will attempt again at a later time .
Physical Therapy        Physical Therapy Cancel Note      DATE: 7/10/2023    NAME: Hussein Stovall  MRN: [id number]   : 1940      Patient not seen this date for Physical Therapy due to:    Pt to discharge @ ~12:00 today.       Electronically signed by Loki Helms PTA on 7/10/2023 at 11:30 AM
Pt belongings packed and sent with pt. Pt nervous about the change. Croft in place and draining. Iv removed tolerated well. Glasses on. Assisted pt to w/c called facility to give report.
Pt cooperative with staff using call light appropriately. Pt watching tv at this time with call light with in reach. Croft in place and draining.
Accepted Pre auth for SNF, valid till 7/13. Kathy Barnhart MD  Internal Medicine Resident, PGY-1  [address];  [place], [place]  7/10/2023, 9:14 AM

## 2023-07-10 NOTE — PLAN OF CARE
Problem: Pain  Goal: Verbalizes/displays adequate comfort level or baseline comfort level  7/10/2023 1029 by Onesimo Melton RN  Outcome: Progressing     Problem: Discharge Planning  Goal: Discharge to home or other facility with appropriate resources  7/10/2023 1029 by Onesimo Melton RN  Outcome: Progressing     Problem: Safety - Adult  Goal: Free from fall injury  7/10/2023 1029 by Onesimo Melton RN  Outcome: Progressing     Problem: ABCDS Injury Assessment  Goal: Absence of physical injury  7/10/2023 1029 by Onesimo Melton RN  Outcome: Progressing     Problem: Skin/Tissue Integrity  Goal: Absence of new skin breakdown  Description: 1. Monitor for areas of redness and/or skin breakdown  2. Assess vascular access sites hourly  3. Every 4-6 hours minimum:  Change oxygen saturation probe site  4. Every 4-6 hours:  If on nasal continuous positive airway pressure, respiratory therapy assess nares and determine need for appliance change or resting period.   7/10/2023 1029 by Onesimo Melton RN  Outcome: Progressing

## 2023-07-10 NOTE — PLAN OF CARE
Problem: Pain  Goal: Verbalizes/displays adequate comfort level or baseline comfort level  7/10/2023 1209 by Shazia Mendez RN  Outcome: Completed     Problem: Discharge Planning  Goal: Discharge to home or other facility with appropriate resources  7/10/2023 1209 by Shazia Mendez RN  Outcome: Completed     Problem: Safety - Adult  Goal: Free from fall injury  7/10/2023 1209 by Shazia Mendez RN  Outcome: Completed     Problem: ABCDS Injury Assessment  Goal: Absence of physical injury  7/10/2023 1209 by Shazia Mendez RN  Outcome: Completed     Problem: Skin/Tissue Integrity  Goal: Absence of new skin breakdown  Description: 1. Monitor for areas of redness and/or skin breakdown  2. Assess vascular access sites hourly  3. Every 4-6 hours minimum:  Change oxygen saturation probe site  4. Every 4-6 hours:  If on nasal continuous positive airway pressure, respiratory therapy assess nares and determine need for appliance change or resting period.   7/10/2023 1209 by Shazia Mendez RN  Outcome: Completed

## 2023-07-10 NOTE — PLAN OF CARE
Problem: Pain  Goal: Verbalizes/displays adequate comfort level or baseline comfort level  7/10/2023 0516 by Maria G Quijano RN  Outcome: Progressing  7/9/2023 1644 by Calixto Perkins RN  Outcome: Progressing     Problem: Safety - Adult  Goal: Free from fall injury  7/10/2023 0516 by Maria G Quijano RN  Outcome: Progressing  7/9/2023 1644 by Calixto Perkins RN  Outcome: Progressing  Flowsheets (Taken 7/9/2023 0800)  Free From Fall Injury: Instruct family/caregiver on patient safety     Problem: Discharge Planning  Goal: Discharge to home or other facility with appropriate resources  7/10/2023 0516 by Maria G Quijano RN  Outcome: Progressing  7/9/2023 1644 by Calixto Perkins RN  Outcome: Progressing  Flowsheets (Taken 7/9/2023 0800)  Discharge to home or other facility with appropriate resources: Identify barriers to discharge with patient and caregiver     Problem: Skin/Tissue Integrity  Goal: Absence of new skin breakdown  Description: 1. Monitor for areas of redness and/or skin breakdown  2. Assess vascular access sites hourly  3. Every 4-6 hours minimum:  Change oxygen saturation probe site  4. Every 4-6 hours:  If on nasal continuous positive airway pressure, respiratory therapy assess nares and determine need for appliance change or resting period.   Outcome: Progressing

## 2023-07-10 NOTE — CARE COORDINATION
Discharge [address] Case Management Department  Written by: Paulina Gonzáles RN    Patient Name: Eliud Turner  Attending Provider: Ramon Parson MD  Admit Date: 2023  5:06 AM  MRN: [id number]  Account: [de-identified]                     : 1940  Discharge Date: 7/10/23  [facility]  Faxed transport request to [facility]  @ 12  Faxed orders to [facility]  Hens already done  Called dyllan Owens left  with update    Disposition: [facility]    Paulina Gonzáles RN

## 2023-07-14 NOTE — DISCHARGE SUMMARY
[address]     Department of Internal Medicine - Staff Internal Medicine Teaching Service    INPATIENT DISCHARGE SUMMARY      Patient Identification:  Arabella Leigh is a 80 y.o. female. :  1940  MRN: [id number]     Acct: [de-identified]   PCP: Paola Calderón MD  Admit Date:  2023  Discharge date and time: 7/10/2023 12:17 PM   Attending Provider: No att. providers found                                     [address] [facility] Problem Lists:  Principal Problem:    OUSMANE (generalized anxiety disorder)  Active Problems:    Abdominal pain    Rigidity    Colitis    Acute urinary retention  Resolved Problems:    Urinary tract infection without hematuria    Anxiety state    Tremor    Delirium    Parkinsonism (HCC)    Action tremor    OLIVA (acute kidney injury) [facility])      HOSPITAL STAY     Brief Inpatient course:   Arabella Leigh is a 80 y.o. female who was admitted for the management of OUSMANE (generalized anxiety disorder). with a chief complaint of anxiety and tremors for the last 3 weeks. She has had this problem for many years but it has worsened recently. She states that she has had multiple falls at home due to this. She recently moved from [place] with over 200 pills of Xanax per prescription. Unclear how much she takes. MRI showed no acute changes. EKG showed no ischemic changes. No other medical issues are known. The patient was treated with Ativan 0.5 Mg in the ER. The patient continued to have repeated episodes of anxiety and tachycardia throughout her hospital stay. This is managed with as needed Ativan, changed to as needed Seroquel. Psychology consulted and began Klonopin. Psych agreed with Seroquel use as needed and beta-blocker as a scheduled med. On  the patient claims of overnight left abdominal pain and tenderness. CT showed evidence of colitis for which antibiotics were started.   Per nurse she had multiple episodes of

## 2023-07-27 ENCOUNTER — HOSPITAL ENCOUNTER (OUTPATIENT)
Age: 83
Setting detail: SPECIMEN
Discharge: HOME | End: 2023-07-27

## 2023-07-27 ENCOUNTER — OFFICE VISIT (OUTPATIENT)
Dept: UROLOGY | Age: 83
End: 2023-07-27
Payer: MEDICARE

## 2023-07-27 VITALS
BODY MASS INDEX: 20.49 KG/M2 | HEART RATE: 82 BPM | SYSTOLIC BLOOD PRESSURE: 119 MMHG | WEIGHT: 120 LBS | HEIGHT: 64 IN | DIASTOLIC BLOOD PRESSURE: 68 MMHG

## 2023-07-27 DIAGNOSIS — N28.1 ACQUIRED RENAL CYST OF RIGHT KIDNEY: ICD-10-CM

## 2023-07-27 DIAGNOSIS — R33.8 OTHER RETENTION OF URINE: Primary | ICD-10-CM

## 2023-07-27 DIAGNOSIS — R30.0 DYSURIA: ICD-10-CM

## 2023-07-27 DIAGNOSIS — N39.42 URINARY INCONTINENCE WITHOUT SENSORY AWARENESS: ICD-10-CM

## 2023-07-27 PROCEDURE — 51701 INSERT BLADDER CATHETER: CPT | Performed by: SPECIALIST

## 2023-07-27 PROCEDURE — 51798 US URINE CAPACITY MEASURE: CPT | Performed by: SPECIALIST

## 2023-07-27 RX ORDER — PHENAZOPYRIDINE HYDROCHLORIDE 200 MG/1
200 TABLET, FILM COATED ORAL 3 TIMES DAILY PRN
Qty: 9 TABLET | Refills: 0 | Status: SHIPPED | OUTPATIENT
Start: 2023-07-27 | End: 2023-07-30

## 2023-07-27 RX ORDER — NITROFURANTOIN 25; 75 MG/1; MG/1
100 CAPSULE ORAL 2 TIMES DAILY
Qty: 20 CAPSULE | Refills: 0 | Status: SHIPPED | OUTPATIENT
Start: 2023-07-27 | End: 2023-08-06

## 2023-07-27 NOTE — PROGRESS NOTES
Shannon Gallardo MD, [facility]  [address] [place] [address] Urology Clinic New Patient office note    Patient:  Jose Carlson  YOB: 1940  Date: 2023    HISTORY OF PRESENT ILLNESS:   The patient is a 80 y.o. female who presents today for evaluation of the following problems:   Urinary Retention:  Patient is here today for Acute Urinary Retention which occured 1-2 week(s) ago   and was sudden in onset. Patient currently does not have a urinary catheter in place because it fell out   Volume of urine obtained when catheter was placed: volume unknown  Prior to this event voiding symptoms consisted of decreased urinary stream  Prior treatments include: none   Patient has urinary incontinence without sensory awareness and ongoing dysuria. Today's AUA Symptom Score (QOL): n/a (n/a)  (Patient's old records have been requested, reviewed and summarized in today's note.)  Summary of old records:   History of AUR, incomplete bladder emptyin23 Postvoid Residual = 74 mL  Dysuria: st cath urine for C&S, empiric Rx with Macrobid  Urinary incontinence wo sensory awareness: will consider Rx depending on C&S results  Right renal cyst on 23 CT stone, suggestion of internal calcifications (complex)    Procedures Today: Postvoid Residual:  Post-void Residual by bladder scanner: 74 mL (23)     Urinalysis today:  No results found for this visit on 23. Last BUN and creatinine:  Lab Results   Component Value Date    BUN 10 07/10/2023     Lab Results   Component Value Date    CREATININE 0.6 07/10/2023       Last CBC:  Lab Results   Component Value Date    WBC 9.1 07/10/2023    HGB 10.4 (L) 07/10/2023    HCT 32.5 (L) 07/10/2023    MCV 98.8 07/10/2023     07/10/2023       Additional Lab/Culture results:   Component    Specimen Description . CLEAN CATCH URINE    Culture NO SIGNIFICANT GROWTH    Resulting Agency [address] [facility]              Specimen Collected: 23 22:45 EDT             Imaging

## 2023-07-29 LAB
MICROORGANISM SPEC CULT: ABNORMAL
SPECIMEN DESCRIPTION: ABNORMAL

## 2023-08-10 ENCOUNTER — APPOINTMENT (OUTPATIENT)
Dept: GENERAL RADIOLOGY | Age: 83
End: 2023-08-10
Payer: MEDICARE

## 2023-08-10 ENCOUNTER — APPOINTMENT (OUTPATIENT)
Dept: CT IMAGING | Age: 83
End: 2023-08-10
Payer: MEDICARE

## 2023-08-10 ENCOUNTER — HOSPITAL ENCOUNTER (INPATIENT)
Age: 83
LOS: 28 days | Discharge: NURSING, REHAB OR LONG-TERM CARE | End: 2023-09-07
Attending: EMERGENCY MEDICINE
Payer: MEDICARE

## 2023-08-10 DIAGNOSIS — N30.00 ACUTE CYSTITIS WITHOUT HEMATURIA: ICD-10-CM

## 2023-08-10 DIAGNOSIS — F41.1 GAD (GENERALIZED ANXIETY DISORDER): ICD-10-CM

## 2023-08-10 DIAGNOSIS — R41.82 ALTERED MENTAL STATUS, UNSPECIFIED ALTERED MENTAL STATUS TYPE: ICD-10-CM

## 2023-08-10 DIAGNOSIS — A41.9 SEPTICEMIA (HCC): Primary | ICD-10-CM

## 2023-08-10 LAB
ALBUMIN SERPL-MCNC: 4.5 G/DL (ref 3.5–5.2)
ALBUMIN/GLOB SERPL: 1.5 {RATIO} (ref 1–2.5)
ALP SERPL-CCNC: 70 U/L (ref 35–104)
ALT SERPL-CCNC: 16 U/L (ref 5–33)
AMMONIA PLAS-SCNC: 15 UMOL/L (ref 11–51)
ANION GAP SERPL CALCULATED.3IONS-SCNC: 15 MMOL/L (ref 9–17)
AST SERPL-CCNC: 28 U/L
BACTERIA URNS QL MICRO: ABNORMAL
BASOPHILS # BLD: 0.05 K/UL (ref 0–0.2)
BASOPHILS NFR BLD: 1 % (ref 0–2)
BILIRUB SERPL-MCNC: 1.2 MG/DL (ref 0.3–1.2)
BILIRUB UR QL STRIP: ABNORMAL
BUN BLD-MCNC: 18 MG/DL (ref 8–26)
BUN SERPL-MCNC: 18 MG/DL (ref 8–23)
CA-I BLD-SCNC: 1.3 MMOL/L (ref 1.15–1.33)
CALCIUM SERPL-MCNC: 10.9 MG/DL (ref 8.6–10.4)
CASTS #/AREA URNS LPF: ABNORMAL /LPF (ref 0–2)
CASTS #/AREA URNS LPF: ABNORMAL /LPF (ref 0–2)
CHLORIDE BLD-SCNC: 102 MMOL/L (ref 98–107)
CHLORIDE SERPL-SCNC: 98 MMOL/L (ref 98–107)
CLARITY UR: CLEAR
CO2 BLD CALC-SCNC: 28 MMOL/L (ref 22–30)
CO2 SERPL-SCNC: 24 MMOL/L (ref 20–31)
COLOR UR: ABNORMAL
CREAT SERPL-MCNC: 1.2 MG/DL (ref 0.5–0.9)
EGFR, POC: 41 ML/MIN/1.73M2
EOSINOPHIL # BLD: 0.04 K/UL (ref 0–0.44)
EOSINOPHILS RELATIVE PERCENT: 1 % (ref 1–4)
EPI CELLS #/AREA URNS HPF: ABNORMAL /HPF (ref 0–5)
ERYTHROCYTE [DISTWIDTH] IN BLOOD BY AUTOMATED COUNT: 13.8 % (ref 11.8–14.4)
GFR SERPL CREATININE-BSD FRML MDRD: 45 ML/MIN/1.73M2
GLUCOSE BLD-MCNC: 81 MG/DL (ref 65–105)
GLUCOSE BLD-MCNC: 85 MG/DL (ref 74–100)
GLUCOSE SERPL-MCNC: 93 MG/DL (ref 70–99)
GLUCOSE UR STRIP-MCNC: NEGATIVE MG/DL
HCO3 VENOUS: 27.8 MMOL/L (ref 22–29)
HCT VFR BLD AUTO: 38 % (ref 36–46)
HCT VFR BLD AUTO: 38.2 % (ref 36.3–47.1)
HGB BLD-MCNC: 12.5 G/DL (ref 11.9–15.1)
HGB UR QL STRIP.AUTO: NEGATIVE
IMM GRANULOCYTES # BLD AUTO: <0.03 K/UL (ref 0–0.3)
IMM GRANULOCYTES NFR BLD: 0 %
KETONES UR STRIP-MCNC: NEGATIVE MG/DL
LACTIC ACID, SEPSIS WHOLE BLOOD: 1 MMOL/L (ref 0.5–1.9)
LACTIC ACID, SEPSIS WHOLE BLOOD: 1.2 MMOL/L (ref 0.5–1.9)
LEUKOCYTE ESTERASE UR QL STRIP: ABNORMAL
LYMPHOCYTES NFR BLD: 3.34 K/UL (ref 1.1–3.7)
LYMPHOCYTES RELATIVE PERCENT: 41 % (ref 24–43)
MAGNESIUM SERPL-MCNC: 2.2 MG/DL (ref 1.6–2.6)
MCH RBC QN AUTO: 30.9 PG (ref 25.2–33.5)
MCHC RBC AUTO-ENTMCNC: 32.7 G/DL (ref 28.4–34.8)
MCV RBC AUTO: 94.3 FL (ref 82.6–102.9)
MONOCYTES NFR BLD: 0.69 K/UL (ref 0.1–1.2)
MONOCYTES NFR BLD: 8 % (ref 3–12)
MUCOUS THREADS URNS QL MICRO: ABNORMAL
NEUTROPHILS NFR BLD: 49 % (ref 36–65)
NEUTS SEG NFR BLD: 4.07 K/UL (ref 1.5–8.1)
NITRITE UR QL STRIP: POSITIVE
NRBC BLD-RTO: 0 PER 100 WBC
O2 SAT, VEN: 32.1 % (ref 60–85)
PCO2, VEN: 37.9 MM HG (ref 41–51)
PH UR STRIP: 5 [PH] (ref 5–8)
PH VENOUS: 7.47 (ref 7.32–7.43)
PLATELET # BLD AUTO: 297 K/UL (ref 138–453)
PMV BLD AUTO: 9.7 FL (ref 8.1–13.5)
PO2, VEN: 18.6 MM HG (ref 30–50)
POC ANION GAP: 11 MMOL/L (ref 7–16)
POC CREATININE: 1.3 MG/DL (ref 0.51–1.19)
POC HEMOGLOBIN (CALC): 12.9 G/DL (ref 12–16)
POC LACTIC ACID: 0.7 MMOL/L (ref 0.56–1.39)
POSITIVE BASE EXCESS, VEN: 4 MMOL/L (ref 0–3)
POTASSIUM BLD-SCNC: 3.1 MMOL/L (ref 3.5–4.5)
POTASSIUM SERPL-SCNC: 3.2 MMOL/L (ref 3.7–5.3)
PROT SERPL-MCNC: 7.5 G/DL (ref 6.4–8.3)
PROT UR STRIP-MCNC: ABNORMAL MG/DL
RBC # BLD AUTO: 4.05 M/UL (ref 3.95–5.11)
RBC #/AREA URNS HPF: ABNORMAL /HPF (ref 0–2)
SODIUM BLD-SCNC: 140 MMOL/L (ref 138–146)
SODIUM SERPL-SCNC: 137 MMOL/L (ref 135–144)
SP GR UR STRIP: 1.01 (ref 1–1.03)
UROBILINOGEN UR STRIP-ACNC: NORMAL EU/DL (ref 0–1)
WBC #/AREA URNS HPF: ABNORMAL /HPF (ref 0–5)
WBC OTHER # BLD: 8.2 K/UL (ref 3.5–11.3)

## 2023-08-10 PROCEDURE — 96374 THER/PROPH/DIAG INJ IV PUSH: CPT

## 2023-08-10 PROCEDURE — 82803 BLOOD GASES ANY COMBINATION: CPT

## 2023-08-10 PROCEDURE — 93005 ELECTROCARDIOGRAM TRACING: CPT

## 2023-08-10 PROCEDURE — 80051 ELECTROLYTE PANEL: CPT

## 2023-08-10 PROCEDURE — 83605 ASSAY OF LACTIC ACID: CPT

## 2023-08-10 PROCEDURE — 2580000003 HC RX 258

## 2023-08-10 PROCEDURE — 82330 ASSAY OF CALCIUM: CPT

## 2023-08-10 PROCEDURE — 80053 COMPREHEN METABOLIC PANEL: CPT

## 2023-08-10 PROCEDURE — 85025 COMPLETE CBC W/AUTO DIFF WBC: CPT

## 2023-08-10 PROCEDURE — 51798 US URINE CAPACITY MEASURE: CPT

## 2023-08-10 PROCEDURE — 71045 X-RAY EXAM CHEST 1 VIEW: CPT

## 2023-08-10 PROCEDURE — 87040 BLOOD CULTURE FOR BACTERIA: CPT

## 2023-08-10 PROCEDURE — 84520 ASSAY OF UREA NITROGEN: CPT

## 2023-08-10 PROCEDURE — 93005 ELECTROCARDIOGRAM TRACING: CPT | Performed by: STUDENT IN AN ORGANIZED HEALTH CARE EDUCATION/TRAINING PROGRAM

## 2023-08-10 PROCEDURE — 70450 CT HEAD/BRAIN W/O DYE: CPT

## 2023-08-10 PROCEDURE — 82140 ASSAY OF AMMONIA: CPT

## 2023-08-10 PROCEDURE — 6360000002 HC RX W HCPCS

## 2023-08-10 PROCEDURE — 81001 URINALYSIS AUTO W/SCOPE: CPT

## 2023-08-10 PROCEDURE — 2580000003 HC RX 258: Performed by: EMERGENCY MEDICINE

## 2023-08-10 PROCEDURE — 2580000003 HC RX 258: Performed by: STUDENT IN AN ORGANIZED HEALTH CARE EDUCATION/TRAINING PROGRAM

## 2023-08-10 PROCEDURE — 6360000002 HC RX W HCPCS: Performed by: EMERGENCY MEDICINE

## 2023-08-10 PROCEDURE — 83735 ASSAY OF MAGNESIUM: CPT

## 2023-08-10 PROCEDURE — 87086 URINE CULTURE/COLONY COUNT: CPT

## 2023-08-10 PROCEDURE — 85014 HEMATOCRIT: CPT

## 2023-08-10 PROCEDURE — 82947 ASSAY GLUCOSE BLOOD QUANT: CPT

## 2023-08-10 PROCEDURE — 2060000000 HC ICU INTERMEDIATE R&B

## 2023-08-10 PROCEDURE — 99285 EMERGENCY DEPT VISIT HI MDM: CPT

## 2023-08-10 PROCEDURE — 82565 ASSAY OF CREATININE: CPT

## 2023-08-10 RX ORDER — HALOPERIDOL 5 MG/ML
5 INJECTION INTRAMUSCULAR EVERY 6 HOURS PRN
Status: DISCONTINUED | OUTPATIENT
Start: 2023-08-10 | End: 2023-09-08 | Stop reason: HOSPADM

## 2023-08-10 RX ORDER — SODIUM CHLORIDE 9 MG/ML
INJECTION, SOLUTION INTRAVENOUS PRN
Status: DISCONTINUED | OUTPATIENT
Start: 2023-08-10 | End: 2023-09-08 | Stop reason: HOSPADM

## 2023-08-10 RX ORDER — ENOXAPARIN SODIUM 100 MG/ML
40 INJECTION SUBCUTANEOUS DAILY
Status: DISCONTINUED | OUTPATIENT
Start: 2023-08-10 | End: 2023-09-08 | Stop reason: HOSPADM

## 2023-08-10 RX ORDER — ACETAMINOPHEN 325 MG/1
650 TABLET ORAL EVERY 6 HOURS PRN
Status: DISCONTINUED | OUTPATIENT
Start: 2023-08-10 | End: 2023-09-08 | Stop reason: HOSPADM

## 2023-08-10 RX ORDER — QUETIAPINE FUMARATE 25 MG/1
25 TABLET, FILM COATED ORAL NIGHTLY
Status: DISCONTINUED | OUTPATIENT
Start: 2023-08-10 | End: 2023-09-08 | Stop reason: HOSPADM

## 2023-08-10 RX ORDER — SODIUM CHLORIDE 0.9 % (FLUSH) 0.9 %
5-40 SYRINGE (ML) INJECTION PRN
Status: DISCONTINUED | OUTPATIENT
Start: 2023-08-10 | End: 2023-09-08 | Stop reason: HOSPADM

## 2023-08-10 RX ORDER — POLYETHYLENE GLYCOL 3350 17 G/17G
17 POWDER, FOR SOLUTION ORAL DAILY PRN
Status: DISCONTINUED | OUTPATIENT
Start: 2023-08-10 | End: 2023-09-08 | Stop reason: HOSPADM

## 2023-08-10 RX ORDER — 0.9 % SODIUM CHLORIDE 0.9 %
1000 INTRAVENOUS SOLUTION INTRAVENOUS ONCE
Status: COMPLETED | OUTPATIENT
Start: 2023-08-10 | End: 2023-08-10

## 2023-08-10 RX ORDER — ONDANSETRON 4 MG/1
4 TABLET, ORALLY DISINTEGRATING ORAL EVERY 8 HOURS PRN
Status: DISCONTINUED | OUTPATIENT
Start: 2023-08-10 | End: 2023-09-08 | Stop reason: HOSPADM

## 2023-08-10 RX ORDER — SODIUM CHLORIDE 0.9 % (FLUSH) 0.9 %
5-40 SYRINGE (ML) INJECTION EVERY 12 HOURS SCHEDULED
Status: DISCONTINUED | OUTPATIENT
Start: 2023-08-10 | End: 2023-09-08 | Stop reason: HOSPADM

## 2023-08-10 RX ORDER — SODIUM CHLORIDE 9 MG/ML
INJECTION, SOLUTION INTRAVENOUS CONTINUOUS
Status: ACTIVE | OUTPATIENT
Start: 2023-08-10 | End: 2023-08-11

## 2023-08-10 RX ORDER — LORAZEPAM 2 MG/ML
1 INJECTION INTRAMUSCULAR ONCE
Status: COMPLETED | OUTPATIENT
Start: 2023-08-11 | End: 2023-08-10

## 2023-08-10 RX ORDER — ONDANSETRON 2 MG/ML
4 INJECTION INTRAMUSCULAR; INTRAVENOUS EVERY 6 HOURS PRN
Status: DISCONTINUED | OUTPATIENT
Start: 2023-08-10 | End: 2023-09-08 | Stop reason: HOSPADM

## 2023-08-10 RX ORDER — ACETAMINOPHEN 650 MG/1
650 SUPPOSITORY RECTAL EVERY 6 HOURS PRN
Status: DISCONTINUED | OUTPATIENT
Start: 2023-08-10 | End: 2023-09-08 | Stop reason: HOSPADM

## 2023-08-10 RX ORDER — LORAZEPAM 2 MG/ML
1 INJECTION INTRAMUSCULAR EVERY 4 HOURS PRN
Status: DISCONTINUED | OUTPATIENT
Start: 2023-08-10 | End: 2023-08-11

## 2023-08-10 RX ORDER — CLONAZEPAM 1 MG/1
1 TABLET ORAL 2 TIMES DAILY PRN
Status: DISCONTINUED | OUTPATIENT
Start: 2023-08-10 | End: 2023-08-13

## 2023-08-10 RX ORDER — POTASSIUM CHLORIDE 7.45 MG/ML
10 INJECTION INTRAVENOUS
Status: DISPENSED | OUTPATIENT
Start: 2023-08-10 | End: 2023-08-10

## 2023-08-10 RX ADMIN — POTASSIUM CHLORIDE 10 MEQ: 7.46 INJECTION, SOLUTION INTRAVENOUS at 21:34

## 2023-08-10 RX ADMIN — VANCOMYCIN HYDROCHLORIDE 750 MG: 750 INJECTION, POWDER, LYOPHILIZED, FOR SOLUTION INTRAVENOUS at 20:21

## 2023-08-10 RX ADMIN — LORAZEPAM 1 MG: 2 INJECTION INTRAMUSCULAR; INTRAVENOUS at 23:57

## 2023-08-10 RX ADMIN — HALOPERIDOL LACTATE 5 MG: 5 INJECTION, SOLUTION INTRAMUSCULAR at 17:01

## 2023-08-10 RX ADMIN — ENOXAPARIN SODIUM 40 MG: 100 INJECTION SUBCUTANEOUS at 21:20

## 2023-08-10 RX ADMIN — POTASSIUM CHLORIDE 10 MEQ: 7.46 INJECTION, SOLUTION INTRAVENOUS at 22:39

## 2023-08-10 RX ADMIN — SODIUM CHLORIDE, PRESERVATIVE FREE 10 ML: 5 INJECTION INTRAVENOUS at 21:20

## 2023-08-10 RX ADMIN — CEFTRIAXONE SODIUM 1000 MG: 1 INJECTION, POWDER, FOR SOLUTION INTRAMUSCULAR; INTRAVENOUS at 14:09

## 2023-08-10 RX ADMIN — SODIUM CHLORIDE: 9 INJECTION, SOLUTION INTRAVENOUS at 17:06

## 2023-08-10 RX ADMIN — SODIUM CHLORIDE 1000 ML: 9 INJECTION, SOLUTION INTRAVENOUS at 11:13

## 2023-08-10 RX ADMIN — POTASSIUM CHLORIDE 10 MEQ: 7.46 INJECTION, SOLUTION INTRAVENOUS at 23:59

## 2023-08-10 ASSESSMENT — PAIN - FUNCTIONAL ASSESSMENT: PAIN_FUNCTIONAL_ASSESSMENT: NONE - DENIES PAIN

## 2023-08-10 NOTE — ED NOTES
Pt arrived to ED via EMS  EMS reported that last known well for pt was \"48 hours or so ago. \"   EMS stated that \"pt was in nursing facility when asked by a worker where she was and pt responded that she was in her bosses's office when she was in her nursing room. \"  EMS states \"the nurse that saw the pt two days ago came back from vacation today and noticed that pt had yellow skin and eyes and her right pupil was more dilated than the left along with new onset of confusion worse than 48 hours prior. \"   Pt follows commands but continues asking Sil Wharton is my son, i'm on my menstrual period. \"  Pt applied to cardiac monitor   Pt placed on pur wick    Bed in lowest position. Call light within reach. Will continue to monitor.          Kendra Keyes RN  08/10/23 4785

## 2023-08-10 NOTE — H&P
[address]     Department of Internal Medicine - Staff Internal Medicine Teaching Service          ADMISSION NOTE/HISTORY AND PHYSICAL EXAMINATION   Date: 8/10/2023  Patient Name: Frannie Keyes  Date of admission: 8/10/2023 10:58 AM  YOB: 1940  PCP: Joseline Munoz MD  History Obtained From:  family member,electronic medical record    CHIEF COMPLAINT     Chief complaint: Altered mental status    HISTORY OF PRESENTING ILLNESS     The patient is a 80 y.o. female with past medical history of generalized anxiety disorder, parkinsonism, colitis, acute urinary retention, OLIVA presented  with altered mental status. As per her son the patient is being disoriented for more than 24 hours in the nursing facility. As per the medical record she was admitted in the hospital on 7/2/2023 for the management of generalized anxiety disorder. The patient had repeated episodes of anxiety and tachycardia throughout her hospital stay and she was managed with Ativan and Seroquel as needed, later it was changed to Klonopin by psychiatrist.  During her hospital stay she had a colitis episode for which she was treated with antibiotics and was catheterized with the Croft for acute urinary retention and she was asked to follow-up with urology outpatient for [facility]. On 7/27/2023 patient presented to the urology office for evaluation of urinary retention, at that time as per urology note the patient's urinary catheter was not in place and it fell out. Additionally according to that note the patient had decreased urinary stream, urinary incontinence and dysuria. A bladder scan was done on the same day revealed PVR of 74 mL and straight cath was done and urine was sent for culture sensitivity. she was put on Macrobid 1 p.o. twice daily for 10 days and Pyridium 200 mg p.o. 3 times daily for 3 days. The urine culture sensitivity showed MRSA.       Review of Systems:  Patient is confused and

## 2023-08-10 NOTE — ED PROVIDER NOTES
[address] ED  Emergency Department Encounter  Emergency Medicine Resident     Pt Julio Dias  MRN: [id number]  [address] [facility] 1940  Date of evaluation: 8/10/23  PCP:  Pb Delgado MD  Note Started: 11:03 AM EDT    CHIEF COMPLAINT       Chief Complaint   Patient presents with    Altered Mental Status     HISTORY OF PRESENT ILLNESS  (Location/Symptom, Timing/Onset, Context/Setting, Quality, Duration, Modifying Factors, Severity.)      Irina Gonzáles is a 80 y.o. female who presents with altered mental status for greater than 24 hours from nursing facility. Patient was telling the nursing staff that she was at her bosses office. Patient does have history of UTI. No report of fall in the nursing facility. EMS did notice a change in shape of her pupil in the right eye. Patient is overall noncontributory to history taking due to with confusion and altered mental status. PAST MEDICAL / SURGICAL / SOCIAL / FAMILY HISTORY      has a past medical history of OLIVA (acute kidney injury) ([address]), Anxiety and depression, Depression, GERD (gastroesophageal reflux disease), and Weight loss. has a past surgical history that includes Spine surgery; Tonsillectomy; Colonoscopy (01/01/2011); eye surgery; Upper gastrointestinal endoscopy (04/25/2014); Colonoscopy (04/25/2014); Endoscopy, colon, diagnostic; Coccyx removal (09/01/1950); and Breast biopsy (Right, 2010).     Social History     Socioeconomic History    Marital status: Single     Spouse name: Not on file    Number of children: Not on file    Years of education: Not on file    Highest education level: Not on file   Occupational History     Employer: [address] [place] [address]   Tobacco Use    Smoking status: Never    Smokeless tobacco: Never   Substance and Sexual Activity    Alcohol use: No     Comment: very little    Drug use: No    Sexual activity: Not on file   Other Topics Concern    Not on file   Social History Narrative    Not on

## 2023-08-10 NOTE — PROGRESS NOTES
Patient was previously Tachy,>120 after administering Haldol patient calmed down and went to sleep HR went as low as 78, she is now awake and HR is between 145-150. Resident notified.  No new orders

## 2023-08-11 ENCOUNTER — APPOINTMENT (OUTPATIENT)
Dept: CT IMAGING | Age: 83
End: 2023-08-11
Payer: MEDICARE

## 2023-08-11 PROBLEM — A49.02 MRSA INFECTION: Status: ACTIVE | Noted: 2023-08-11

## 2023-08-11 PROBLEM — E86.0 DEHYDRATION: Status: ACTIVE | Noted: 2023-08-11

## 2023-08-11 PROBLEM — T83.511A CATHETER-ASSOCIATED URINARY TRACT INFECTION (HCC): Status: ACTIVE | Noted: 2023-08-11

## 2023-08-11 PROBLEM — G20 PARKINSON'S DISEASE (HCC): Status: ACTIVE | Noted: 2023-08-11

## 2023-08-11 PROBLEM — R33.9 URINARY RETENTION: Status: ACTIVE | Noted: 2023-08-11

## 2023-08-11 PROBLEM — E87.6 HYPOKALEMIA: Status: ACTIVE | Noted: 2023-08-11

## 2023-08-11 PROBLEM — N39.0 CATHETER-ASSOCIATED URINARY TRACT INFECTION (HCC): Status: ACTIVE | Noted: 2023-08-11

## 2023-08-11 PROBLEM — G20.A1 PARKINSON'S DISEASE: Status: ACTIVE | Noted: 2023-08-11

## 2023-08-11 PROBLEM — R41.0 CONFUSION: Status: ACTIVE | Noted: 2023-08-11

## 2023-08-11 LAB
AMPHET UR QL SCN: NEGATIVE
ANION GAP SERPL CALCULATED.3IONS-SCNC: 15 MMOL/L (ref 9–17)
BARBITURATES UR QL SCN: NEGATIVE
BASOPHILS # BLD: 0.04 K/UL (ref 0–0.2)
BASOPHILS NFR BLD: 1 % (ref 0–2)
BENZODIAZ UR QL: POSITIVE
BUN SERPL-MCNC: 15 MG/DL (ref 8–23)
CALCIUM SERPL-MCNC: 9.3 MG/DL (ref 8.6–10.4)
CANNABINOIDS UR QL SCN: NEGATIVE
CHLORIDE SERPL-SCNC: 107 MMOL/L (ref 98–107)
CO2 SERPL-SCNC: 19 MMOL/L (ref 20–31)
COCAINE UR QL SCN: NEGATIVE
CREAT SERPL-MCNC: 0.9 MG/DL (ref 0.5–0.9)
CRP SERPL HS-MCNC: 4.6 MG/L (ref 0–5)
EKG ATRIAL RATE: 112 BPM
EKG ATRIAL RATE: 91 BPM
EKG P AXIS: 35 DEGREES
EKG P-R INTERVAL: 114 MS
EKG Q-T INTERVAL: 356 MS
EKG Q-T INTERVAL: 376 MS
EKG QRS DURATION: 70 MS
EKG QRS DURATION: 76 MS
EKG QTC CALCULATION (BAZETT): 462 MS
EKG QTC CALCULATION (BAZETT): 488 MS
EKG R AXIS: 46 DEGREES
EKG R AXIS: 52 DEGREES
EKG T AXIS: -32 DEGREES
EKG T AXIS: 49 DEGREES
EKG VENTRICULAR RATE: 113 BPM
EKG VENTRICULAR RATE: 91 BPM
EOSINOPHIL # BLD: 0.03 K/UL (ref 0–0.44)
EOSINOPHILS RELATIVE PERCENT: 1 % (ref 1–4)
ERYTHROCYTE [DISTWIDTH] IN BLOOD BY AUTOMATED COUNT: 14.4 % (ref 11.8–14.4)
FENTANYL UR QL: NEGATIVE
GFR SERPL CREATININE-BSD FRML MDRD: >60 ML/MIN/1.73M2
GLUCOSE SERPL-MCNC: 90 MG/DL (ref 70–99)
HCT VFR BLD AUTO: 33.6 % (ref 36.3–47.1)
HGB BLD-MCNC: 10.4 G/DL (ref 11.9–15.1)
IMM GRANULOCYTES # BLD AUTO: <0.03 K/UL (ref 0–0.3)
IMM GRANULOCYTES NFR BLD: 0 %
LYMPHOCYTES NFR BLD: 2.18 K/UL (ref 1.1–3.7)
LYMPHOCYTES RELATIVE PERCENT: 33 % (ref 24–43)
MAGNESIUM SERPL-MCNC: 2 MG/DL (ref 1.6–2.6)
MCH RBC QN AUTO: 30.8 PG (ref 25.2–33.5)
MCHC RBC AUTO-ENTMCNC: 31 G/DL (ref 28.4–34.8)
MCV RBC AUTO: 99.4 FL (ref 82.6–102.9)
METHADONE UR QL: NEGATIVE
MICROORGANISM SPEC CULT: NORMAL
MONOCYTES NFR BLD: 0.58 K/UL (ref 0.1–1.2)
MONOCYTES NFR BLD: 9 % (ref 3–12)
NEUTROPHILS NFR BLD: 57 % (ref 36–65)
NEUTS SEG NFR BLD: 3.76 K/UL (ref 1.5–8.1)
NRBC BLD-RTO: 0 PER 100 WBC
OPIATES UR QL SCN: NEGATIVE
OXYCODONE UR QL SCN: NEGATIVE
PCP UR QL SCN: NEGATIVE
PLATELET # BLD AUTO: 280 K/UL (ref 138–453)
PMV BLD AUTO: 9.9 FL (ref 8.1–13.5)
POTASSIUM SERPL-SCNC: 3.2 MMOL/L (ref 3.7–5.3)
RBC # BLD AUTO: 3.38 M/UL (ref 3.95–5.11)
SODIUM SERPL-SCNC: 141 MMOL/L (ref 135–144)
SPECIMEN DESCRIPTION: NORMAL
TEST INFORMATION: ABNORMAL
VANCOMYCIN SERPL-MCNC: 7.6 UG/ML
WBC OTHER # BLD: 6.6 K/UL (ref 3.5–11.3)

## 2023-08-11 PROCEDURE — 2580000003 HC RX 258

## 2023-08-11 PROCEDURE — 2580000003 HC RX 258: Performed by: INTERNAL MEDICINE

## 2023-08-11 PROCEDURE — 85025 COMPLETE CBC W/AUTO DIFF WBC: CPT

## 2023-08-11 PROCEDURE — 86140 C-REACTIVE PROTEIN: CPT

## 2023-08-11 PROCEDURE — 83735 ASSAY OF MAGNESIUM: CPT

## 2023-08-11 PROCEDURE — 51701 INSERT BLADDER CATHETER: CPT

## 2023-08-11 PROCEDURE — 99222 1ST HOSP IP/OBS MODERATE 55: CPT | Performed by: INTERNAL MEDICINE

## 2023-08-11 PROCEDURE — 80202 ASSAY OF VANCOMYCIN: CPT

## 2023-08-11 PROCEDURE — 51798 US URINE CAPACITY MEASURE: CPT

## 2023-08-11 PROCEDURE — 36415 COLL VENOUS BLD VENIPUNCTURE: CPT

## 2023-08-11 PROCEDURE — 99223 1ST HOSP IP/OBS HIGH 75: CPT | Performed by: INTERNAL MEDICINE

## 2023-08-11 PROCEDURE — 74176 CT ABD & PELVIS W/O CONTRAST: CPT

## 2023-08-11 PROCEDURE — 6370000000 HC RX 637 (ALT 250 FOR IP): Performed by: INTERNAL MEDICINE

## 2023-08-11 PROCEDURE — 80307 DRUG TEST PRSMV CHEM ANLYZR: CPT

## 2023-08-11 PROCEDURE — 6360000002 HC RX W HCPCS: Performed by: INTERNAL MEDICINE

## 2023-08-11 PROCEDURE — 51702 INSERT TEMP BLADDER CATH: CPT

## 2023-08-11 PROCEDURE — 6360000002 HC RX W HCPCS

## 2023-08-11 PROCEDURE — 80048 BASIC METABOLIC PNL TOTAL CA: CPT

## 2023-08-11 PROCEDURE — APPSS30 APP SPLIT SHARED TIME 16-30 MINUTES: Performed by: NURSE PRACTITIONER

## 2023-08-11 PROCEDURE — 2060000000 HC ICU INTERMEDIATE R&B

## 2023-08-11 RX ORDER — POTASSIUM CHLORIDE 7.45 MG/ML
10 INJECTION INTRAVENOUS
Status: COMPLETED | OUTPATIENT
Start: 2023-08-11 | End: 2023-08-11

## 2023-08-11 RX ORDER — SODIUM CHLORIDE 9 MG/ML
INJECTION, SOLUTION INTRAVENOUS CONTINUOUS
Status: ACTIVE | OUTPATIENT
Start: 2023-08-11 | End: 2023-08-11

## 2023-08-11 RX ORDER — DULOXETIN HYDROCHLORIDE 30 MG/1
60 CAPSULE, DELAYED RELEASE ORAL 2 TIMES DAILY
Status: DISCONTINUED | OUTPATIENT
Start: 2023-08-11 | End: 2023-08-11

## 2023-08-11 RX ORDER — BUSPIRONE HYDROCHLORIDE 15 MG/1
15 TABLET ORAL 3 TIMES DAILY
Status: DISCONTINUED | OUTPATIENT
Start: 2023-08-11 | End: 2023-09-08 | Stop reason: HOSPADM

## 2023-08-11 RX ORDER — DULOXETIN HYDROCHLORIDE 30 MG/1
60 CAPSULE, DELAYED RELEASE ORAL 2 TIMES DAILY
Status: DISCONTINUED | OUTPATIENT
Start: 2023-08-11 | End: 2023-09-08 | Stop reason: HOSPADM

## 2023-08-11 RX ADMIN — POTASSIUM CHLORIDE 10 MEQ: 10 INJECTION, SOLUTION INTRAVENOUS at 11:10

## 2023-08-11 RX ADMIN — POTASSIUM CHLORIDE 10 MEQ: 10 INJECTION, SOLUTION INTRAVENOUS at 07:41

## 2023-08-11 RX ADMIN — LORAZEPAM 1 MG: 2 INJECTION INTRAMUSCULAR; INTRAVENOUS at 06:55

## 2023-08-11 RX ADMIN — ENOXAPARIN SODIUM 40 MG: 100 INJECTION SUBCUTANEOUS at 07:44

## 2023-08-11 RX ADMIN — VANCOMYCIN HYDROCHLORIDE 750 MG: 750 INJECTION, POWDER, LYOPHILIZED, FOR SOLUTION INTRAVENOUS at 05:01

## 2023-08-11 RX ADMIN — POTASSIUM CHLORIDE 10 MEQ: 10 INJECTION, SOLUTION INTRAVENOUS at 14:52

## 2023-08-11 RX ADMIN — POTASSIUM CHLORIDE 10 MEQ: 10 INJECTION, SOLUTION INTRAVENOUS at 09:19

## 2023-08-11 RX ADMIN — SODIUM CHLORIDE: 9 INJECTION, SOLUTION INTRAVENOUS at 13:21

## 2023-08-11 RX ADMIN — CLONAZEPAM 1 MG: 1 TABLET ORAL at 09:53

## 2023-08-11 RX ADMIN — POTASSIUM CHLORIDE 10 MEQ: 7.46 INJECTION, SOLUTION INTRAVENOUS at 01:26

## 2023-08-11 NOTE — CONSULTS
[facility] - Initial Consult Note  Today's Date and Time: 8/11/2023, 12:35 PM    Impression :   Altered mentation  Recent hx MRSA UTI on 7/27/23-Treated with Macrobid  Urinary retention  Hypokalemia  GERD  Anxiety  Parkinson's    Recommendations:   D/C IV Vancomycin- No significant growth on urine culture from 8/10/23  Urology recommendations    Medical Decision Making/Summary/Discussion:8/11/2023       Infection Control Recommendations   [place] Precautions  Contact Precautions for MRSA hx    Antimicrobial Stewardship Recommendations     Discontinuation of therapy  Coordination of Outpatient Care:   Estimated Length of IV antimicrobials:TBD  Patient will need Midline Catheter Insertion: TBD  Patient will need PICC line Insertion:TBD  Patient will need: Home IV , [address],  SNF,  LTAC: TBD  Patient will need outpatient wound care: No    Chief complaint/reason for consultation:   MRSA UTI      History of Present Illness:   Lashawn Sanz is a 80y.o.-year-old  female who was initially admitted on 8/10/2023. Patient seen at the request of Dr. Zuleyka Wayne:    This patient, who sees Dr. Estee Manjarrez, with Urology, for urinary retention, and was treated for a MRSA UTI with Macrobid at the end of July 2023, presented to the ED from her nursing home with confusion x 24 hrs on 8/10/23. She has a history of anxiety that is treated with Klonopin. Her right pupil was noted to be dilated. A CT of her head was negative for any acute abnormalities. Labwork was mostly unremarkable other than hypokalemia and slightly elevated creatinine. The patient was given fluids and was administered a dose of IV Rocephin and was initiated on IV Vancomycin. Blood cultures have yielded no growth thus far and the urine culture shows no significant growth. Urology was consulted and a CT abd/pelvis was ordered.      ID was consulted for patient evaluation and abnormalities. ORBITS: The visualized portion of the orbits demonstrate no acute abnormality. Status post right cataract surgery. SINUSES: The visualized paranasal sinuses and mastoid air cells demonstrate no acute abnormality. SOFT TISSUES/SKULL:  No acute abnormality of the visualized skull or soft tissues. Unchanged hyperostosis frontalis interna. No acute intracranial abnormality. Similar senescent findings, as above. XR CHEST PORTABLE    Result Date: 8/10/2023  EXAMINATION: ONE XRAY VIEW OF THE CHEST 8/10/2023 12:05 pm COMPARISON: 07/02/2023 HISTORY: ORDERING SYSTEM PROVIDED HISTORY: AMS TECHNOLOGIST PROVIDED HISTORY: AMS Reason for Exam: altered mental status, port upr. FINDINGS: Cardiomediastinal silhouette and pulmonary vasculature are within normal limits. No focal airspace consolidation, pneumothorax, or pleural effusion. No free air beneath the diaphragm. No acute osseous abnormality. No acute intrathoracic process. Medical Decision [id number]:     Results       Procedure Component Value Units Date/Time    Culture, Urine [[id number]] Collected: 08/10/23 1222    Order Status: Completed Specimen: Urine, straight catheter Updated: 08/11/23 1111     Specimen Description . URINE,STRAIGHT CATHETER     Culture NO SIGNIFICANT GROWTH    Blood Culture 1 [[id number]] Collected: 08/10/23 1109    Order Status: Completed Specimen: Blood Updated: 08/11/23 1145     Specimen Description . BLOOD     Special Requests RAC 10ML     Culture NO GROWTH 1 DAY    Culture, Blood 2 [[id number]] Collected: 08/10/23 1109    Order Status: Completed Specimen: Blood Updated: 08/11/23 1145     Specimen Description . BLOOD     Special Requests LAC 10ML     Culture NO GROWTH 1 DAY          Contains abnormal data Culture, Urine  Order: [id number]  Status: Final result     Visible to patient: Yes (not seen)     Next appt: 09/07/2023 at 09:15 AM in Family Medicine ([address], [place])     Dx: Dysuria

## 2023-08-11 NOTE — PROGRESS NOTES
@4507 noted patient become tachycardic at around 150s when we woke up. Patient is confused. Informed resident on duty. Doctor come and assessed patient on bedside. Given ativan to help patient relax. Will monitor patient as per instruction. As per , will do ct scan once hear rate is  more controlled. . Will monitor patient. @0195 noted patient started to be relaxed heart rate at 80 bpm. Eyes closed. @3134 patient woke up gain and heart rate at 140s. .Informed resident on duty. returned to less than 100 when patient is asleep. @0230; patient hasnt pass urine since admission. Bladder scan done noted 475.done straight cath. Noted 450 ml output, jono in color. Post void is 0

## 2023-08-11 NOTE — PROGRESS NOTES
[facility]  Occupational Therapy Not Seen Note    DATE: 2023    NAME: Jose Carlson  MRN: [id number]   : 1940      Patient not seen this date for Occupational Therapy due to: Other: Per RN pt on sedation medications at this time and unable to participate in therapy. OT will check back tomorrow as able.          Electronically signed by CARLITO White on 2023 at 3:39 PM

## 2023-08-11 NOTE — PROGRESS NOTES
MD notified of patient retaining urine, straight cathed 2x, bladder scan showed 700mls in bladder primary stated to reach out to urology, urology stated to place lance.

## 2023-08-11 NOTE — CARE COORDINATION
Transitional planning    Writer to room to conduct intake assessment , patient sound asleep and did not wake to verbal stimuli, will reattempt if time allows.

## 2023-08-11 NOTE — CONSULTS
Urology Consultation    Patient:  Jonny Madera  MRN: [id number]  YOB: 1940    CHIEF COMPLAINT:  Urinary retention    HISTORY OF PRESENT ILLNESS:   The patient is a 80 y.o. female who presented from care facility with altered mental status. Urology was consulted for urinary retention. Patient has a pure wick in place, cannister draining clear yellow urine. She is known to Dr. Brisa Kelley for retention at a previous hospital admission. She was seen within the last month in clinic and at that time had a PVR 74cc indicating she was not in retention. UA concerning for infection. Ucx is pending. Blood cultures 2/2 preliminarily no growth. Cr is elevated at 1.2 (baseline 0.6). CT abd/pel is ordered and pending. Patient is unable to provide any history due to mentation status. On presentation she is afebrile with stable vitals. Most recent CT abd pel was 7/8/2023 which was negative for hydro or stones. Most recent Ucx was within 2 weeks ago and grew MRSA > 100K. Patient's old records, notes and chart reviewed and summarized above.     Past Medical History:    Past Medical History:   Diagnosis Date    OLIVA (acute kidney injury) ([address]) 7/8/2023    Anxiety and depression     Depression     GERD (gastroesophageal reflux disease)     Weight loss        Past Surgical History:    Past Surgical History:   Procedure Laterality Date    BREAST BIOPSY Right 2010    COCCYX REMOVAL  09/01/1950    COLONOSCOPY  01/01/2011    COLONOSCOPY  04/25/2014    ENDOSCOPY, COLON, DIAGNOSTIC      EYE SURGERY      injury to eye    SPINE SURGERY      tailbone removed as teenager    TONSILLECTOMY      UPPER GASTROINTESTINAL ENDOSCOPY  04/25/2014       Medications:    Scheduled Meds:   sodium chloride flush  5-40 mL IntraVENous 2 times per day    enoxaparin  40 mg SubCUTAneous Daily    QUEtiapine  25 mg Oral Nightly    potassium chloride  10 mEq IntraVENous Q1H    vancomycin (VANCOCIN) intermittent dosing (placeholder)   Other RX Placeholder

## 2023-08-11 NOTE — PLAN OF CARE
Problem: Discharge Planning  Goal: Discharge to home or other facility with appropriate resources  8/10/2023 2058 by Albaro Gibbs RN  Outcome: Progressing  8/10/2023 2058 by Albaro Gibbs RN  Outcome: Progressing     Problem: Skin/Tissue Integrity  Goal: Absence of new skin breakdown  Description: 1. Monitor for areas of redness and/or skin breakdown  2. Assess vascular access sites hourly  3. Every 4-6 hours minimum:  Change oxygen saturation probe site  4. Every 4-6 hours:  If on nasal continuous positive airway pressure, respiratory therapy assess nares and determine need for appliance change or resting period.   8/10/2023 2058 by Albaro Gibbs RN  Outcome: Progressing  8/10/2023 2058 by Albaro Gibbs RN  Outcome: Progressing     Problem: Safety - Adult  Goal: Free from fall injury  Outcome: Progressing

## 2023-08-11 NOTE — PLAN OF CARE
Problem: Discharge Planning  Goal: Discharge to home or other facility with appropriate resources  8/11/2023 0302 by Harry Garcia RN  Outcome: Progressing  8/10/2023 2058 by Erica Piper RN  Outcome: Progressing  8/10/2023 2058 by Erica Piper RN  Outcome: Progressing     Problem: Skin/Tissue Integrity  Goal: Absence of new skin breakdown  Description: 1. Monitor for areas of redness and/or skin breakdown  2. Assess vascular access sites hourly  3. Every 4-6 hours minimum:  Change oxygen saturation probe site  4. Every 4-6 hours:  If on nasal continuous positive airway pressure, respiratory therapy assess nares and determine need for appliance change or resting period.   8/11/2023 0302 by Harry Garcia RN  Outcome: Progressing  8/10/2023 2058 by Erica Piper RN  Outcome: Progressing  8/10/2023 2058 by Erica Piper RN  Outcome: Progressing     Problem: Safety - Adult  Goal: Free from fall injury  8/11/2023 0302 by Harry Garcia RN  Outcome: Progressing  8/10/2023 2058 by Erica Piper RN  Outcome: Progressing

## 2023-08-11 NOTE — PROGRESS NOTES
[facility]  Internal Medicine Teaching Residency Program  Inpatient Daily Progress Note  ______________________________________________________________________________    Patient: Briana Kemp  YOB: 1940   [id number]    Acct: [de-identified]     Room: [address] [place] [address]-  Admit date: 8/10/2023  Today's date: 08/11/23  Number of days in the hospital: 1    SUBJECTIVE   Admitting Diagnosis: Delirium  CC: Altered Mental status  Pt examined at bedside. Chart & results reviewed. Pt is disoriented and agitated. Pt is afebrile. ROS:  Constitutional:  negative for chills, fevers, sweats  Respiratory:  negative for cough, dyspnea on exertion, hemoptysis, shortness of breath, wheezing  Cardiovascular:  negative for chest pain, chest pressure/discomfort, lower extremity edema, palpitations  Gastrointestinal:  negative for abdominal pain, constipation, diarrhea, nausea, vomiting  Neurological:  negative for dizziness, headache  BRIEF HISTORY     The patient is a 80 y.o. female with past medical history of generalized anxiety disorder, parkinsonism, colitis, acute urinary retention, OLIVA presented  with altered mental status. As per her son the patient is being disoriented for more than 24 hours in the nursing facility. As per the medical record she was admitted in the hospital on 7/2/2023 for the management of generalized anxiety disorder. The patient had repeated episodes of anxiety and tachycardia throughout her hospital stay and she was managed with Ativan and Seroquel as needed, later it was changed to Klonopin by psychiatrist.  During her hospital stay she had a colitis episode for which she was treated with antibiotics and was catheterized with the Croft for acute urinary retention and she was asked to follow-up with urology outpatient for [facility].   On 7/27/2023 patient presented to the urology office for evaluation of urinary retention, at that time as per

## 2023-08-11 NOTE — CARE COORDINATION
Case Management Assessment  Initial Evaluation    Date/Time of Evaluation: 8/11/2023 2:58 PM  Assessment Completed by: Constantino Woods RN    If patient is discharged prior to next notation, then this note serves as note for discharge by case management. Patient Name: Kassie Welch                   YOB: 1940  Diagnosis: Altered mental status [R41.82]  Septicemia ([address]) [A41.9]  Acute cystitis without hematuria [N30.00]  Sepsis ([address]) [A41.9]  Altered mental status, unspecified altered mental status type [R41.82]                   Date / Time: 8/10/2023 10:58 AM    Patient Admission Status: Inpatient   Readmission Risk (Low < 19, Mod (19-27), High > 27): Readmission Risk Score: 18.5    Current PCP: Олег Yeboah MD  PCP verified by CM? No    Chart Reviewed: Yes      History Provided by: Child/Family  Patient Orientation: Person    Patient Cognition: Dementia / Early Alzheimer's    Hospitalization in the last 30 days (Readmission):  No    If yes, Readmission Assessment in  Navigator will be completed. Advance Directives:      Code Status: Full Code   Patient's Primary Decision Maker is:        Discharge Planning:    Patient lives with: Alone Type of Home: [address] [place] [address]  Primary Care Giver:  Other (Comment) ([facility] staff)  Patient Support Systems include: Family Members   Current Financial resources:    Current community resources:    Current services prior to admission: Durable Medical Equipment            Current DME: Walker            Type of Home Care services:       ADLS  Prior functional level: Assistance with the following:, Bathing, Dressing, Toileting, Cooking, Housework, Shopping, Mobility  Current functional level: Assistance with the following:, Bathing, Dressing, Toileting, Cooking, Housework, Shopping, Mobility    PT AM-PAC:   /24  OT AM-PAC:   /24    Family can provide assistance at DC: No  Would you like Case Management to discuss the discharge plan with Representative Agree with the Discharge Plan?       Noemy Medina RN  Case Management Department  Ph: [phone]

## 2023-08-11 NOTE — PLAN OF CARE
Called patient's son Mr Mack Wolf on phone to get information regarding patient's mentation and baseline before coming to ER as no family member has been seen around since admission. Ivelisse Jean Baptiste said she was at nursing facility from where she was brought in to ER and he and his wife visited her in ER. Per him she was more confused and altered in ER then she was at the facility when they saw her. He also mentioned that he had not seen her recently in the NF but his brother had seen her in person at nursing facility last week and she definitely as worse she is this time. I also updated him on patients current status that so far her infectious workup is negative but she is still delirious and we will continue to monitor. Ivelisse Jean Baptiste was able to understand the whole conversation and was appreciative of the call. Family's questions were answered to their satisfaction. Will keep them updated.     Electronically signed by Tata Magallanes MD on 8/11/2023 at 3:37 PM

## 2023-08-12 PROBLEM — E43 SEVERE PROTEIN-CALORIE MALNUTRITION (HCC): Status: ACTIVE | Noted: 2023-08-12

## 2023-08-12 PROBLEM — G93.41 ACUTE METABOLIC ENCEPHALOPATHY: Status: ACTIVE | Noted: 2023-08-12

## 2023-08-12 LAB
ALBUMIN SERPL-MCNC: 3.4 G/DL (ref 3.5–5.2)
ALBUMIN/GLOB SERPL: 1.5 {RATIO} (ref 1–2.5)
ALP SERPL-CCNC: 58 U/L (ref 35–104)
ALT SERPL-CCNC: 13 U/L (ref 5–33)
ANION GAP SERPL CALCULATED.3IONS-SCNC: 15 MMOL/L (ref 9–17)
AST SERPL-CCNC: 23 U/L
BASOPHILS # BLD: 0.05 K/UL (ref 0–0.2)
BASOPHILS NFR BLD: 1 % (ref 0–2)
BILIRUB DIRECT SERPL-MCNC: 0.2 MG/DL
BILIRUB INDIRECT SERPL-MCNC: 0.5 MG/DL (ref 0–1)
BILIRUB SERPL-MCNC: 0.7 MG/DL (ref 0.3–1.2)
BUN SERPL-MCNC: 8 MG/DL (ref 8–23)
CALCIUM SERPL-MCNC: 9.3 MG/DL (ref 8.6–10.4)
CHLORIDE SERPL-SCNC: 107 MMOL/L (ref 98–107)
CO2 SERPL-SCNC: 18 MMOL/L (ref 20–31)
CREAT SERPL-MCNC: 0.7 MG/DL (ref 0.5–0.9)
EKG ATRIAL RATE: 104 BPM
EKG Q-T INTERVAL: 504 MS
EKG QRS DURATION: 76 MS
EKG QTC CALCULATION (BAZETT): 675 MS
EKG R AXIS: 46 DEGREES
EKG T AXIS: -29 DEGREES
EKG VENTRICULAR RATE: 108 BPM
EOSINOPHIL # BLD: 0.05 K/UL (ref 0–0.44)
EOSINOPHILS RELATIVE PERCENT: 1 % (ref 1–4)
ERYTHROCYTE [DISTWIDTH] IN BLOOD BY AUTOMATED COUNT: 14.6 % (ref 11.8–14.4)
FOLATE SERPL-MCNC: 14.8 NG/ML
GFR SERPL CREATININE-BSD FRML MDRD: >60 ML/MIN/1.73M2
GLUCOSE BLD-MCNC: 213 MG/DL (ref 65–105)
GLUCOSE BLD-MCNC: 60 MG/DL (ref 65–105)
GLUCOSE SERPL-MCNC: 89 MG/DL (ref 70–99)
HCT VFR BLD AUTO: 35.8 % (ref 36.3–47.1)
HGB BLD-MCNC: 10.7 G/DL (ref 11.9–15.1)
IMM GRANULOCYTES # BLD AUTO: 0.03 K/UL (ref 0–0.3)
IMM GRANULOCYTES NFR BLD: 0 %
LYMPHOCYTES NFR BLD: 2.32 K/UL (ref 1.1–3.7)
LYMPHOCYTES RELATIVE PERCENT: 32 % (ref 24–43)
MAGNESIUM SERPL-MCNC: 1.7 MG/DL (ref 1.6–2.6)
MCH RBC QN AUTO: 30.7 PG (ref 25.2–33.5)
MCHC RBC AUTO-ENTMCNC: 29.9 G/DL (ref 28.4–34.8)
MCV RBC AUTO: 102.6 FL (ref 82.6–102.9)
MONOCYTES NFR BLD: 0.54 K/UL (ref 0.1–1.2)
MONOCYTES NFR BLD: 7 % (ref 3–12)
NEUTROPHILS NFR BLD: 59 % (ref 36–65)
NEUTS SEG NFR BLD: 4.34 K/UL (ref 1.5–8.1)
NRBC BLD-RTO: 0 PER 100 WBC
PLATELET # BLD AUTO: 256 K/UL (ref 138–453)
PMV BLD AUTO: 9.4 FL (ref 8.1–13.5)
POTASSIUM SERPL-SCNC: 3.3 MMOL/L (ref 3.7–5.3)
PROT SERPL-MCNC: 5.7 G/DL (ref 6.4–8.3)
RBC # BLD AUTO: 3.49 M/UL (ref 3.95–5.11)
RBC # BLD: ABNORMAL 10*6/UL
SODIUM SERPL-SCNC: 140 MMOL/L (ref 135–144)
VIT B12 SERPL-MCNC: 1056 PG/ML (ref 232–1245)
WBC OTHER # BLD: 7.3 K/UL (ref 3.5–11.3)

## 2023-08-12 PROCEDURE — 2500000003 HC RX 250 WO HCPCS

## 2023-08-12 PROCEDURE — 82746 ASSAY OF FOLIC ACID SERUM: CPT

## 2023-08-12 PROCEDURE — 6360000002 HC RX W HCPCS

## 2023-08-12 PROCEDURE — 2060000000 HC ICU INTERMEDIATE R&B

## 2023-08-12 PROCEDURE — 93010 ELECTROCARDIOGRAM REPORT: CPT | Performed by: INTERNAL MEDICINE

## 2023-08-12 PROCEDURE — 93005 ELECTROCARDIOGRAM TRACING: CPT | Performed by: INTERNAL MEDICINE

## 2023-08-12 PROCEDURE — 99232 SBSQ HOSP IP/OBS MODERATE 35: CPT | Performed by: INTERNAL MEDICINE

## 2023-08-12 PROCEDURE — 80076 HEPATIC FUNCTION PANEL: CPT

## 2023-08-12 PROCEDURE — 2580000003 HC RX 258

## 2023-08-12 PROCEDURE — 93005 ELECTROCARDIOGRAM TRACING: CPT

## 2023-08-12 PROCEDURE — 6370000000 HC RX 637 (ALT 250 FOR IP)

## 2023-08-12 PROCEDURE — 80048 BASIC METABOLIC PNL TOTAL CA: CPT

## 2023-08-12 PROCEDURE — 82607 VITAMIN B-12: CPT

## 2023-08-12 PROCEDURE — 85025 COMPLETE CBC W/AUTO DIFF WBC: CPT

## 2023-08-12 PROCEDURE — 99222 1ST HOSP IP/OBS MODERATE 55: CPT | Performed by: PSYCHIATRY & NEUROLOGY

## 2023-08-12 PROCEDURE — 83735 ASSAY OF MAGNESIUM: CPT

## 2023-08-12 PROCEDURE — 82947 ASSAY GLUCOSE BLOOD QUANT: CPT

## 2023-08-12 PROCEDURE — 6370000000 HC RX 637 (ALT 250 FOR IP): Performed by: INTERNAL MEDICINE

## 2023-08-12 PROCEDURE — 36415 COLL VENOUS BLD VENIPUNCTURE: CPT

## 2023-08-12 RX ORDER — POTASSIUM CHLORIDE 7.45 MG/ML
10 INJECTION INTRAVENOUS
Status: COMPLETED | OUTPATIENT
Start: 2023-08-12 | End: 2023-08-12

## 2023-08-12 RX ORDER — ATORVASTATIN CALCIUM 20 MG/1
20 TABLET, FILM COATED ORAL DAILY
Status: DISCONTINUED | OUTPATIENT
Start: 2023-08-12 | End: 2023-09-08 | Stop reason: HOSPADM

## 2023-08-12 RX ORDER — DEXTROSE MONOHYDRATE 25 G/50ML
25 INJECTION, SOLUTION INTRAVENOUS ONCE
Status: COMPLETED | OUTPATIENT
Start: 2023-08-12 | End: 2023-08-12

## 2023-08-12 RX ORDER — DEXTROSE MONOHYDRATE 100 MG/ML
INJECTION, SOLUTION INTRAVENOUS CONTINUOUS PRN
Status: DISCONTINUED | OUTPATIENT
Start: 2023-08-12 | End: 2023-09-08 | Stop reason: HOSPADM

## 2023-08-12 RX ORDER — SENNA AND DOCUSATE SODIUM 50; 8.6 MG/1; MG/1
2 TABLET, FILM COATED ORAL DAILY
Status: DISCONTINUED | OUTPATIENT
Start: 2023-08-12 | End: 2023-09-08 | Stop reason: HOSPADM

## 2023-08-12 RX ORDER — PROPRANOLOL HYDROCHLORIDE 10 MG/1
10 TABLET ORAL 2 TIMES DAILY
Status: DISCONTINUED | OUTPATIENT
Start: 2023-08-12 | End: 2023-08-13

## 2023-08-12 RX ORDER — MAGNESIUM SULFATE IN WATER 40 MG/ML
2000 INJECTION, SOLUTION INTRAVENOUS ONCE
Status: COMPLETED | OUTPATIENT
Start: 2023-08-12 | End: 2023-08-12

## 2023-08-12 RX ORDER — SODIUM CHLORIDE 9 MG/ML
INJECTION, SOLUTION INTRAVENOUS CONTINUOUS
Status: DISCONTINUED | OUTPATIENT
Start: 2023-08-12 | End: 2023-08-12

## 2023-08-12 RX ORDER — DEXTROSE AND SODIUM CHLORIDE 5; .45 G/100ML; G/100ML
INJECTION, SOLUTION INTRAVENOUS CONTINUOUS
Status: DISCONTINUED | OUTPATIENT
Start: 2023-08-12 | End: 2023-08-13

## 2023-08-12 RX ADMIN — MAGNESIUM SULFATE HEPTAHYDRATE 2000 MG: 40 INJECTION, SOLUTION INTRAVENOUS at 15:01

## 2023-08-12 RX ADMIN — POTASSIUM CHLORIDE 10 MEQ: 10 INJECTION, SOLUTION INTRAVENOUS at 09:59

## 2023-08-12 RX ADMIN — SODIUM CHLORIDE, PRESERVATIVE FREE 10 ML: 5 INJECTION INTRAVENOUS at 19:59

## 2023-08-12 RX ADMIN — BUSPIRONE HYDROCHLORIDE 15 MG: 15 TABLET ORAL at 14:55

## 2023-08-12 RX ADMIN — PROPRANOLOL HYDROCHLORIDE 10 MG: 10 TABLET ORAL at 11:30

## 2023-08-12 RX ADMIN — BUSPIRONE HYDROCHLORIDE 15 MG: 15 TABLET ORAL at 08:02

## 2023-08-12 RX ADMIN — SODIUM CHLORIDE, PRESERVATIVE FREE 10 ML: 5 INJECTION INTRAVENOUS at 08:29

## 2023-08-12 RX ADMIN — CLONAZEPAM 1 MG: 1 TABLET ORAL at 18:04

## 2023-08-12 RX ADMIN — DEXTROSE AND SODIUM CHLORIDE: 5; 450 INJECTION, SOLUTION INTRAVENOUS at 11:43

## 2023-08-12 RX ADMIN — POTASSIUM CHLORIDE 10 MEQ: 10 INJECTION, SOLUTION INTRAVENOUS at 11:33

## 2023-08-12 RX ADMIN — POTASSIUM CHLORIDE 10 MEQ: 10 INJECTION, SOLUTION INTRAVENOUS at 12:42

## 2023-08-12 RX ADMIN — POTASSIUM CHLORIDE 10 MEQ: 10 INJECTION, SOLUTION INTRAVENOUS at 08:23

## 2023-08-12 RX ADMIN — DULOXETINE HYDROCHLORIDE 60 MG: 30 CAPSULE, DELAYED RELEASE ORAL at 08:01

## 2023-08-12 RX ADMIN — ATORVASTATIN CALCIUM 20 MG: 20 TABLET, FILM COATED ORAL at 11:32

## 2023-08-12 RX ADMIN — DULOXETINE HYDROCHLORIDE 60 MG: 30 CAPSULE, DELAYED RELEASE ORAL at 19:58

## 2023-08-12 RX ADMIN — DEXTROSE MONOHYDRATE 25 G: 25 INJECTION, SOLUTION INTRAVENOUS at 01:05

## 2023-08-12 RX ADMIN — ENOXAPARIN SODIUM 40 MG: 100 INJECTION SUBCUTANEOUS at 08:33

## 2023-08-12 RX ADMIN — PROPRANOLOL HYDROCHLORIDE 10 MG: 10 TABLET ORAL at 19:59

## 2023-08-12 RX ADMIN — BUSPIRONE HYDROCHLORIDE 15 MG: 15 TABLET ORAL at 19:58

## 2023-08-12 RX ADMIN — SODIUM CHLORIDE: 9 INJECTION, SOLUTION INTRAVENOUS at 14:58

## 2023-08-12 RX ADMIN — SODIUM CHLORIDE: 9 INJECTION, SOLUTION INTRAVENOUS at 00:25

## 2023-08-12 NOTE — CONSULTS
Comprehensive Nutrition Assessment    Type and Reason for Visit:  Consult (Poor appetite x 5 days, ONS)    Nutrition Recommendations/Plan:   Continue current diet, Regular  Will send high kcal/high protein ONS BID (vanilla only)  Pt meets ASPEN criteria for Severe Protein-calorie malnutrition, dx added  Encourage/monitor PO intake     Malnutrition Assessment:  Malnutrition Status:  Severe malnutrition (08/12/23 1447)    Context:  Chronic Illness     Findings of the 6 clinical characteristics of malnutrition:  Energy Intake:  75% or less estimated energy requirements for 1 month or longer  Weight Loss:  Greater than 20% over 1 year     Body Fat Loss:  Mild body fat loss Orbital   Muscle Mass Loss:  Mild muscle mass loss Hand (interosseous)  Fluid Accumulation:  No significant fluid accumulation     Strength:  Not Performed    Nutrition Assessment:    81 yo F adm delirium. PMH significant for OLIVA, depression, GERD. Per chart review, 21% wt loss x 9 mos (significant). Pt reports poor PO intake pta, states she has been anxious and that has impacted her appetite. Per pt 25-50% PO intake at lunch. Pt is agreeable to ONS, prefers vanilla flavor. Per pt NKFA. LBM 8/12. No edema noted. Nutrition Related Findings:    labs/meds reviewed Wound Type: None       Current Nutrition Intake & Therapies:    Average Meal Intake: 1-25%  Average Supplements Intake: None Ordered  ADULT DIET; Regular    Anthropometric Measures:  Height: 5' 4\" (162.6 cm)  Ideal Body Weight (IBW): 120 lbs (55 kg)    Admission Body Weight: 114 lb 13.8 oz (52.1 kg) (8/12)  Current Body Weight: 114 lb 13.8 oz (52.1 kg) (8/12), 95.7 % IBW.     Current BMI (kg/m2): 19.7  Usual Body Weight: 146 lb 6.4 oz (66.4 kg) (12/13/22)  % Weight Change (Calculated): -21.5  Weight Adjustment For: No Adjustment                 BMI Categories: Underweight (BMI less than 22) age over 72    Estimated Daily Nutrient Needs:  Energy Requirements Based On: Kcal/kg  Weight Used

## 2023-08-12 NOTE — PROGRESS NOTES
[facility]  Progress Note    Kassie Welch  MRN: [id number]  Date: 8/12/2023  LOS: 2     Reason for F/U :   Urinary tract infection    Impression :   Altered mentation  Recent hx MRSA UTI on 7/27/23-Treated with Macrobid  Urinary retention  Hypokalemia  GERD  Anxiety  Parkinson's    Recommendations: The patient continues to be anxious and pleasantly confused was asking me repeatedly for Wellbutrin. From an infectious disease about is no evidence of an acute infection   And continues off systemic antimicrobial therapy    Infection Control Recommendations:   Universal precautions    Discharge Planning:   Patient will need Midline Catheter Insertion/ PICC line Insertion: No  Patient will need: Home IV , [address] [place],  SNF,  LTAC: Undetermined  Patient willneed outpatient wound care: No    Medical Decision making / Summary of Stay:   Kassie Welch is a 80y.o.-year-old  female who was initially admitted on 8/10/2023. Patient seen at the request of Dr. Liane Yu:     This patient, who sees Dr. Lucia Meeks, with Urology, for urinary retention, and was treated for a MRSA UTI with Macrobid at the end of July 2023, presented to the ED from her nursing home with confusion x 24 hrs on 8/10/23. She has a history of anxiety that is treated with Klonopin. Her right pupil was noted to be dilated. A CT of her head was negative for any acute abnormalities. Labwork was mostly unremarkable other than hypokalemia and slightly elevated creatinine. The patient was given fluids and was administered a dose of IV Rocephin and was initiated on IV Vancomycin. Blood cultures have yielded no growth thus far and the urine culture shows no significant growth. Urology was consulted and a CT abd/pelvis was ordered. ID was consulted for patient evaluation and antibiotic recommendations. CT Head 8/11/23:  No acute intracranial abnormality.   Similar senescent

## 2023-08-12 NOTE — CONSULTS
[facility]   IN-PATIENT SERVICE      NEUROLOGY CONSULT  NOTE            Date:   8/12/2023  Patient name:  Alicia Galloway  Date of admission:  8/10/2023  YOB: 1940      Chief Complaint:     Chief Complaint   Patient presents with    Altered Mental Status     Reason for Consult:      AMS    History of Present Illness: The patient is a 80 y.o. female with PMH of anxiety, tremor, recurrent UTI who was brought to ED from nursing facility due to agitation. She was found to have UTI. Patient was seen and evaluated by neurology service making July due to concern of tremor and recurrent falls. His tremor was believed to be associated with cardiac medications including olanzapine, Ativan and Atarax, Cymbalta and BuSpar. They were a concern for benzodiazepine withdrawal at the time. Upon evaluation, patient is somnolent. She opens her eyes on tactile and verbal stimuli, maintain eye contact briefly. She does not answer questions and is not able to provide any meaningful history, she is constantly repeated \"stop it \"and \"get away \". She is moving with all extremities. No focal deficit was appreciated. Past Medical History:     Past Medical History:   Diagnosis Date    OLIVA (acute kidney injury) ([address]) 7/8/2023    Anxiety and depression     Depression     GERD (gastroesophageal reflux disease)     Weight loss         Past Surgical History:     Past Surgical History:   Procedure Laterality Date    BREAST BIOPSY Right 2010    COCCYX REMOVAL  09/01/1950    COLONOSCOPY  01/01/2011    COLONOSCOPY  04/25/2014    ENDOSCOPY, COLON, DIAGNOSTIC      EYE SURGERY      injury to eye    SPINE SURGERY      tailbone removed as teenager    TONSILLECTOMY      UPPER GASTROINTESTINAL ENDOSCOPY  04/25/2014        Medications Prior to Admission:     Prior to Admission medications    Medication Sig Start Date End Date Taking?  Authorizing Provider   QUEtiapine (SEROQUEL) 25 MG tablet Take 1 tablet by mouth ORBITS: The visualized portion of the orbits demonstrate no acute abnormality. Status post right cataract surgery. SINUSES: The visualized paranasal sinuses and mastoid air cells demonstrate no acute abnormality. SOFT TISSUES/SKULL:  No acute abnormality of the visualized skull or soft tissues. Unchanged hyperostosis frontalis interna. No acute intracranial abnormality. Similar senescent findings, as above. XR CHEST PORTABLE    Result Date: 8/10/2023  EXAMINATION: ONE XRAY VIEW OF THE CHEST 8/10/2023 12:05 pm COMPARISON: 07/02/2023 HISTORY: ORDERING SYSTEM PROVIDED HISTORY: AMS TECHNOLOGIST PROVIDED HISTORY: AMS Reason for Exam: altered mental status, port upr. FINDINGS: Cardiomediastinal silhouette and pulmonary vasculature are within normal limits. No focal airspace consolidation, pneumothorax, or pleural effusion. No free air beneath the diaphragm. No acute osseous abnormality. No acute intrathoracic process. Impression and plan : Altered mental status, alternating hypo and hyperactive delirium. Likely multifactorial in nature , possible benzodiazepine withdrawal component, multiple antipsychotic and sedating medication.   CT head negative]  -Treat underlying condition: UTI and urinary retention  -Patient antipsychotic and sedating medications may need to be revised    Further recommendations will follow      Electronically signed by   Aruna Deutsch MD  8/12/2023  2:22 AM

## 2023-08-12 NOTE — PLAN OF CARE
Problem: Discharge Planning  Goal: Discharge to home or other facility with appropriate resources  Outcome: Progressing     Problem: Skin/Tissue Integrity  Goal: Absence of new skin breakdown  Description: 1. Monitor for areas of redness and/or skin breakdown  2. Assess vascular access sites hourly  3. Every 4-6 hours minimum:  Change oxygen saturation probe site  4. Every 4-6 hours:  If on nasal continuous positive airway pressure, respiratory therapy assess nares and determine need for appliance change or resting period. Outcome: Progressing     Problem: Safety - Adult  Goal: Free from fall injury  Outcome: Progressing     Problem: ABCDS Injury Assessment  Goal: Absence of physical injury  Outcome: Progressing     Problem: Confusion  Goal: Confusion, delirium, dementia, or psychosis is improved or at baseline  Description: INTERVENTIONS:  1. Assess for possible contributors to thought disturbance, including medications, impaired vision or hearing, underlying metabolic abnormalities, dehydration, psychiatric diagnoses, and notify attending LIP  2. [place] high risk fall precautions, as indicated  3. Provide frequent short contacts to provide reality reorientation, refocusing and direction  4. Decrease environmental stimuli, including noise as appropriate  5. Monitor and intervene to maintain adequate nutrition, hydration, elimination, sleep and activity  6. If unable to ensure safety without constant attention obtain sitter and review sitter guidelines with assigned personnel  7.  Initiate Psychosocial CNS and Spiritual Care consult, as indicated  Outcome: Progressing

## 2023-08-12 NOTE — PLAN OF CARE
Problem: Discharge Planning  Goal: Discharge to home or other facility with appropriate resources  8/12/2023 0749 by Anson Brady RN  Outcome: Progressing  8/11/2023 2209 by Crissy Russell RN  Outcome: Progressing     Problem: Skin/Tissue Integrity  Goal: Absence of new skin breakdown  Description: 1. Monitor for areas of redness and/or skin breakdown  2. Assess vascular access sites hourly  3. Every 4-6 hours minimum:  Change oxygen saturation probe site  4. Every 4-6 hours:  If on nasal continuous positive airway pressure, respiratory therapy assess nares and determine need for appliance change or resting period. 8/12/2023 0749 by Anson Brady RN  Outcome: Progressing  8/11/2023 2209 by Crissy Russell RN  Outcome: Progressing     Problem: Safety - Adult  Goal: Free from fall injury  8/12/2023 0749 by Anson Brady RN  Outcome: Progressing  8/11/2023 2209 by Crissy Russell RN  Outcome: Progressing     Problem: ABCDS Injury Assessment  Goal: Absence of physical injury  8/12/2023 0749 by Anson Brady RN  Outcome: Progressing  8/11/2023 2209 by Crissy Russell RN  Outcome: Progressing     Problem: Confusion  Goal: Confusion, delirium, dementia, or psychosis is improved or at baseline  Description: INTERVENTIONS:  1. Assess for possible contributors to thought disturbance, including medications, impaired vision or hearing, underlying metabolic abnormalities, dehydration, psychiatric diagnoses, and notify attending LIP  2. [place] high risk fall precautions, as indicated  3. Provide frequent short contacts to provide reality reorientation, refocusing and direction  4. Decrease environmental stimuli, including noise as appropriate  5. Monitor and intervene to maintain adequate nutrition, hydration, elimination, sleep and activity  6. If unable to ensure safety without constant attention obtain sitter and review sitter guidelines with assigned personnel  7.  Initiate Psychosocial CNS and

## 2023-08-13 LAB
ANION GAP SERPL CALCULATED.3IONS-SCNC: 11 MMOL/L (ref 9–17)
BASOPHILS # BLD: 0.04 K/UL (ref 0–0.2)
BASOPHILS NFR BLD: 1 % (ref 0–2)
BUN SERPL-MCNC: 5 MG/DL (ref 8–23)
CALCIUM SERPL-MCNC: 9.3 MG/DL (ref 8.6–10.4)
CHLORIDE SERPL-SCNC: 106 MMOL/L (ref 98–107)
CO2 SERPL-SCNC: 21 MMOL/L (ref 20–31)
CREAT SERPL-MCNC: 0.6 MG/DL (ref 0.5–0.9)
EOSINOPHIL # BLD: 0.06 K/UL (ref 0–0.44)
EOSINOPHILS RELATIVE PERCENT: 1 % (ref 1–4)
ERYTHROCYTE [DISTWIDTH] IN BLOOD BY AUTOMATED COUNT: 14.2 % (ref 11.8–14.4)
GFR SERPL CREATININE-BSD FRML MDRD: >60 ML/MIN/1.73M2
GLUCOSE SERPL-MCNC: 149 MG/DL (ref 70–99)
HCT VFR BLD AUTO: 31.9 % (ref 36.3–47.1)
HGB BLD-MCNC: 10.6 G/DL (ref 11.9–15.1)
IMM GRANULOCYTES # BLD AUTO: <0.03 K/UL (ref 0–0.3)
IMM GRANULOCYTES NFR BLD: 0 %
LYMPHOCYTES NFR BLD: 1.98 K/UL (ref 1.1–3.7)
LYMPHOCYTES RELATIVE PERCENT: 36 % (ref 24–43)
MAGNESIUM SERPL-MCNC: 2 MG/DL (ref 1.6–2.6)
MCH RBC QN AUTO: 31.4 PG (ref 25.2–33.5)
MCHC RBC AUTO-ENTMCNC: 33.2 G/DL (ref 28.4–34.8)
MCV RBC AUTO: 94.4 FL (ref 82.6–102.9)
MONOCYTES NFR BLD: 0.44 K/UL (ref 0.1–1.2)
MONOCYTES NFR BLD: 8 % (ref 3–12)
NEUTROPHILS NFR BLD: 54 % (ref 36–65)
NEUTS SEG NFR BLD: 2.99 K/UL (ref 1.5–8.1)
NRBC BLD-RTO: 0 PER 100 WBC
PLATELET # BLD AUTO: 297 K/UL (ref 138–453)
PMV BLD AUTO: 9.9 FL (ref 8.1–13.5)
POTASSIUM SERPL-SCNC: 3.5 MMOL/L (ref 3.7–5.3)
RBC # BLD AUTO: 3.38 M/UL (ref 3.95–5.11)
SODIUM SERPL-SCNC: 138 MMOL/L (ref 135–144)
WBC OTHER # BLD: 5.5 K/UL (ref 3.5–11.3)

## 2023-08-13 PROCEDURE — 83735 ASSAY OF MAGNESIUM: CPT

## 2023-08-13 PROCEDURE — 85025 COMPLETE CBC W/AUTO DIFF WBC: CPT

## 2023-08-13 PROCEDURE — 93005 ELECTROCARDIOGRAM TRACING: CPT

## 2023-08-13 PROCEDURE — 6370000000 HC RX 637 (ALT 250 FOR IP): Performed by: INTERNAL MEDICINE

## 2023-08-13 PROCEDURE — 2580000003 HC RX 258

## 2023-08-13 PROCEDURE — 99232 SBSQ HOSP IP/OBS MODERATE 35: CPT | Performed by: INTERNAL MEDICINE

## 2023-08-13 PROCEDURE — 97116 GAIT TRAINING THERAPY: CPT

## 2023-08-13 PROCEDURE — 6370000000 HC RX 637 (ALT 250 FOR IP)

## 2023-08-13 PROCEDURE — 2060000000 HC ICU INTERMEDIATE R&B

## 2023-08-13 PROCEDURE — 80048 BASIC METABOLIC PNL TOTAL CA: CPT

## 2023-08-13 PROCEDURE — 6360000002 HC RX W HCPCS

## 2023-08-13 PROCEDURE — 51798 US URINE CAPACITY MEASURE: CPT

## 2023-08-13 PROCEDURE — 97162 PT EVAL MOD COMPLEX 30 MIN: CPT

## 2023-08-13 PROCEDURE — 36415 COLL VENOUS BLD VENIPUNCTURE: CPT

## 2023-08-13 PROCEDURE — 99231 SBSQ HOSP IP/OBS SF/LOW 25: CPT | Performed by: INTERNAL MEDICINE

## 2023-08-13 PROCEDURE — 99231 SBSQ HOSP IP/OBS SF/LOW 25: CPT | Performed by: PSYCHIATRY & NEUROLOGY

## 2023-08-13 RX ORDER — PROPRANOLOL HYDROCHLORIDE 20 MG/1
20 TABLET ORAL 2 TIMES DAILY
Status: DISCONTINUED | OUTPATIENT
Start: 2023-08-13 | End: 2023-09-08 | Stop reason: HOSPADM

## 2023-08-13 RX ORDER — LABETALOL HYDROCHLORIDE 5 MG/ML
10 INJECTION, SOLUTION INTRAVENOUS EVERY 4 HOURS PRN
Status: DISCONTINUED | OUTPATIENT
Start: 2023-08-13 | End: 2023-09-08 | Stop reason: HOSPADM

## 2023-08-13 RX ORDER — CLONAZEPAM 1 MG/1
1 TABLET ORAL 2 TIMES DAILY
Status: DISCONTINUED | OUTPATIENT
Start: 2023-08-13 | End: 2023-08-14

## 2023-08-13 RX ORDER — POTASSIUM CHLORIDE 7.45 MG/ML
10 INJECTION INTRAVENOUS
Status: COMPLETED | OUTPATIENT
Start: 2023-08-13 | End: 2023-08-13

## 2023-08-13 RX ADMIN — PROPRANOLOL HYDROCHLORIDE 20 MG: 20 TABLET ORAL at 08:48

## 2023-08-13 RX ADMIN — CLONAZEPAM 1 MG: 1 TABLET ORAL at 20:32

## 2023-08-13 RX ADMIN — BUSPIRONE HYDROCHLORIDE 15 MG: 15 TABLET ORAL at 20:33

## 2023-08-13 RX ADMIN — DULOXETINE HYDROCHLORIDE 60 MG: 30 CAPSULE, DELAYED RELEASE ORAL at 08:03

## 2023-08-13 RX ADMIN — PROPRANOLOL HYDROCHLORIDE 20 MG: 20 TABLET ORAL at 20:33

## 2023-08-13 RX ADMIN — STANDARDIZED SENNA CONCENTRATE AND DOCUSATE SODIUM 2 TABLET: 8.6; 5 TABLET ORAL at 08:03

## 2023-08-13 RX ADMIN — BUSPIRONE HYDROCHLORIDE 15 MG: 15 TABLET ORAL at 08:03

## 2023-08-13 RX ADMIN — POTASSIUM CHLORIDE 10 MEQ: 7.46 INJECTION, SOLUTION INTRAVENOUS at 10:30

## 2023-08-13 RX ADMIN — HALOPERIDOL LACTATE 5 MG: 5 INJECTION, SOLUTION INTRAMUSCULAR at 19:50

## 2023-08-13 RX ADMIN — CLONAZEPAM 1 MG: 1 TABLET ORAL at 08:47

## 2023-08-13 RX ADMIN — POTASSIUM CHLORIDE 10 MEQ: 7.46 INJECTION, SOLUTION INTRAVENOUS at 08:02

## 2023-08-13 RX ADMIN — ATORVASTATIN CALCIUM 20 MG: 20 TABLET, FILM COATED ORAL at 08:04

## 2023-08-13 RX ADMIN — QUETIAPINE FUMARATE 25 MG: 25 TABLET ORAL at 20:33

## 2023-08-13 RX ADMIN — Medication 10 MG: at 09:46

## 2023-08-13 RX ADMIN — ENOXAPARIN SODIUM 40 MG: 100 INJECTION SUBCUTANEOUS at 08:06

## 2023-08-13 RX ADMIN — POTASSIUM CHLORIDE 10 MEQ: 7.46 INJECTION, SOLUTION INTRAVENOUS at 09:18

## 2023-08-13 RX ADMIN — BUSPIRONE HYDROCHLORIDE 15 MG: 15 TABLET ORAL at 13:08

## 2023-08-13 RX ADMIN — POTASSIUM CHLORIDE 10 MEQ: 7.46 INJECTION, SOLUTION INTRAVENOUS at 11:44

## 2023-08-13 RX ADMIN — SODIUM CHLORIDE, PRESERVATIVE FREE 10 ML: 5 INJECTION INTRAVENOUS at 08:06

## 2023-08-13 RX ADMIN — DULOXETINE HYDROCHLORIDE 60 MG: 30 CAPSULE, DELAYED RELEASE ORAL at 20:33

## 2023-08-13 RX ADMIN — SODIUM CHLORIDE, PRESERVATIVE FREE 10 ML: 5 INJECTION INTRAVENOUS at 20:45

## 2023-08-13 ASSESSMENT — ENCOUNTER SYMPTOMS
RESPIRATORY NEGATIVE: 1
EYES NEGATIVE: 1

## 2023-08-13 NOTE — PROGRESS NOTES
[facility]   IN-PATIENT SERVICE      NEUROLOGY CONSULT  NOTE            Date:   8/13/2023  Patient name:  Oseas Alvarado  Date of admission:  8/10/2023  YOB: 1940      Chief Complaint:     Chief Complaint   Patient presents with    Altered Mental Status     Reason for Consult:      AMS    History of Present Illness: The patient is a 80 y.o. female with PMH of anxiety, tremor, recurrent UTI who was brought to ED from nursing facility due to agitation. She was found to have UTI. Patient was seen and evaluated by neurology service making July due to concern of tremor and recurrent falls. His tremor was believed to be associated with cardiac medications including olanzapine, Ativan and Atarax, Cymbalta and BuSpar. They were a concern for benzodiazepine withdrawal at the time. Upon evaluation, patient is somnolent. She opens her eyes on tactile and verbal stimuli, maintain eye contact briefly. She does not answer questions and is not able to provide any meaningful history, she is constantly repeated \"stop it \"and \"get away \". She is moving with all extremities. No focal deficit was appreciated. 8/13: Patient is still confused and mildly agitated but discuss with RN that patient is relatively better as compared to yesterday. QT interval is improving will recommend continuing Seroquel nightly. Patient symptoms are secondary from UTI.      Past Medical History:     Past Medical History:   Diagnosis Date    OLIVA (acute kidney injury) ([address]) 7/8/2023    Anxiety and depression     Depression     GERD (gastroesophageal reflux disease)     Weight loss         Past Surgical History:     Past Surgical History:   Procedure Laterality Date    BREAST BIOPSY Right 2010    COCCYX REMOVAL  09/01/1950    COLONOSCOPY  01/01/2011    COLONOSCOPY  04/25/2014    ENDOSCOPY, COLON, DIAGNOSTIC      EYE SURGERY      injury to eye    SPINE SURGERY      tailbone removed as teenager    TONSILLECTOMY workup recommended from neurology. Will signoff.     Further recommendations will follow      Electronically signed by   Curt Ledezma MD  8/13/2023  7:58 AM

## 2023-08-13 NOTE — PROGRESS NOTES
[facility]  Internal Medicine Teaching Residency Program  Inpatient Daily Progress Note  ______________________________________________________________________________    Patient: Branden Grimes  YOB: 1940   ZRX:[id number]    Acct: [de-identified]     Room: [address] [place] [address]  Admit date: 8/10/2023  Today's date: 08/13/23  Number of days in the hospital: 3    SUBJECTIVE   Admitting Diagnosis: Delirium  CC: Altered Mental status    Pt examined at bedside. Chart & results reviewed. -Patient was anxious and having tremors when I examined this morning.  -Heart rate was in 130s-140s and blood pressure was high 153/101 morning, her blood sugars are 149 this morning  -Gave 1 dose of Klonopin  -  ROS:  Review of Systems   Constitutional:  Positive for activity change and appetite change. HENT: Negative. Eyes: Negative. Respiratory: Negative. Genitourinary:         Suprapubic tenderness present     BRIEF HISTORY     The patient is a 80 y.o. female with past medical history of generalized anxiety disorder, parkinsonism, colitis, acute urinary retention, OLIVA presented  with altered mental status. As per her son the patient is being disoriented for more than 24 hours in the nursing facility. As per the medical record she was admitted in the hospital on 7/2/2023 for the management of generalized anxiety disorder. The patient had repeated episodes of anxiety and tachycardia throughout her hospital stay and she was managed with Ativan and Seroquel as needed, later it was changed to Klonopin by psychiatrist.  During her hospital stay she had a colitis episode for which she was treated with antibiotics and was catheterized with the Croft for acute urinary retention and she was asked to follow-up with urology outpatient for [facility].   On 7/27/2023 patient presented to the urology office for evaluation of urinary retention, at that time as per urology note the

## 2023-08-13 NOTE — PROGRESS NOTES
Physical Therapy  Facility/Department: [address] STEPDOWN  Physical Therapy Initial Assessment    Name: Briana Kemp  : 1940  MRN: [id number]  Date of Service: 2023  Chief Complaint   Patient presents with    Altered Mental Status       Discharge Recommendations:  Patient would benefit from continued therapy after discharge          Patient Diagnosis(es): The primary encounter diagnosis was Septicemia [facility]). Diagnoses of Altered mental status, unspecified altered mental status type and Acute cystitis without hematuria were also pertinent to this visit. Past Medical History:  has a past medical history of OLIVA (acute kidney injury) ([address]), Anxiety and depression, Depression, GERD (gastroesophageal reflux disease), and Weight loss. Past Surgical History:  has a past surgical history that includes Spine surgery; Tonsillectomy; Colonoscopy (2011); eye surgery; Upper gastrointestinal endoscopy (2014); Colonoscopy (2014); Endoscopy, colon, diagnostic; Coccyx removal (1950); and Breast biopsy (Right, 2010). Assessment   Body Structures, Functions, Activity Limitations Requiring Skilled Therapeutic Intervention: Decreased functional mobility ; Increased pain;Decreased posture;Decreased strength;Decreased safe awareness;Decreased cognition;Decreased coordination;Decreased balance  Assessment: Despite being mostly in bed for 3 days, patient was able to sit up, stand, and ambulate from one side of bed to the other. Complaining of back pain upon therapist's arrival and said her back felt better after, though when asked for a number, she said \"10\" despite saying it hurt only \"a little\". Patient will need further PT to regain functional mobility.   Therapy Prognosis: Good  Decision Making: Medium Complexity  Clinical Presentation: evolving  Requires PT Follow-Up: Yes     Plan   Physcial Therapy Plan  General Plan:  (5-6x/wk)  Current Treatment Recommendations: Home exercise program, Gait

## 2023-08-13 NOTE — PLAN OF CARE
Problem: Discharge Planning  Goal: Discharge to home or other facility with appropriate resources  Outcome: Progressing     Problem: Skin/Tissue Integrity  Goal: Absence of new skin breakdown  Description: 1. Monitor for areas of redness and/or skin breakdown  2. Assess vascular access sites hourly  3. Every 4-6 hours minimum:  Change oxygen saturation probe site  4. Every 4-6 hours:  If on nasal continuous positive airway pressure, respiratory therapy assess nares and determine need for appliance change or resting period. Outcome: Progressing     Problem: Safety - Adult  Goal: Free from fall injury  Outcome: Progressing     Problem: ABCDS Injury Assessment  Goal: Absence of physical injury  Outcome: Progressing     Problem: Confusion  Goal: Confusion, delirium, dementia, or psychosis is improved or at baseline  Description: INTERVENTIONS:  1. Assess for possible contributors to thought disturbance, including medications, impaired vision or hearing, underlying metabolic abnormalities, dehydration, psychiatric diagnoses, and notify attending LIP  2. [place] high risk fall precautions, as indicated  3. Provide frequent short contacts to provide reality reorientation, refocusing and direction  4. Decrease environmental stimuli, including noise as appropriate  5. Monitor and intervene to maintain adequate nutrition, hydration, elimination, sleep and activity  6. If unable to ensure safety without constant attention obtain sitter and review sitter guidelines with assigned personnel  7.  Initiate Psychosocial CNS and Spiritual Care consult, as indicated  Outcome: Progressing     Problem: Nutrition Deficit:  Goal: Optimize nutritional status  Outcome: Progressing

## 2023-08-13 NOTE — PROGRESS NOTES
[facility]  Progress Note    Rosario eRed  MRN: [id number]  Date: 8/13/2023  LOS: 3     Reason for F/U :   Urinary tract infection    Impression :   Altered mentation  Recent hx MRSA UTI on 7/27/23-Treated with Macrobid  Urinary retention  Hypokalemia  GERD  Anxiety  Parkinson's    Recommendations:   Infectious disease as the patient does not have any evidence of an acute infection   Continue off systemic antimicrobial therapy  She continues to be anxious and currently has a TeleSitter    Infection Control Recommendations:   Universal precautions    Discharge Planning:   Patient will need Midline Catheter Insertion/ PICC line Insertion: No  Patient will need: Home IV , [address] [place],  SNF,  LTAC: Undetermined  Patient willneed outpatient wound care: No    Medical Decision making / Summary of Stay:   Rosario Reed is a 80y.o.-year-old  female who was initially admitted on 8/10/2023. Patient seen at the request of Dr. Alondra Schmidt:     This patient, who sees Dr. Dudley Courtney, with Urology, for urinary retention, and was treated for a MRSA UTI with Macrobid at the end of July 2023, presented to the ED from her nursing home with confusion x 24 hrs on 8/10/23. She has a history of anxiety that is treated with Klonopin. Her right pupil was noted to be dilated. A CT of her head was negative for any acute abnormalities. Labwork was mostly unremarkable other than hypokalemia and slightly elevated creatinine. The patient was given fluids and was administered a dose of IV Rocephin and was initiated on IV Vancomycin. Blood cultures have yielded no growth thus far and the urine culture shows no significant growth. Urology was consulted and a CT abd/pelvis was ordered. ID was consulted for patient evaluation and antibiotic recommendations. CT Head 8/11/23:  No acute intracranial abnormality. Similar senescent findings, as above.      CT found for: PROCAL  Lab Results   Component Value Date/Time    CRP 4.6 08/11/2023 02:47 AM    CRP 8.3 07/02/2023 11:34 AM     Lab Results   Component Value Date    SEDRATE 5 07/09/2015         No results found for: DDIMER  Lab Results   Component Value Date/Time    FERRITIN 42 07/10/2015 04:52 PM     No results found for: LDH  No results found for: FIBRINOGEN    No results found for requested labs within last 30 days. Lab Results   Component Value Date/Time    COVID19 Not Detected 07/02/2023 10:20 AM    COVID19 Not Detected 03/02/2023 10:37 PM       No results for input(s): VANCOTROUGH in the last 72 hours. Imaging Studies:   CT OF THE ABDOMEN AND PELVIS WITHOUT CONTRAST 8/11/2023 11:31 am  IMPRESSION:  1. No acute process in the abdomen or pelvis. Previously noted findings of  colitis at the left side of the colon have improved. 2.  Moderate stool burden, suggesting constipation. 3.  No obstructive uropathy or urolithiasis. 4.  Single focus of gas noted in the urinary bladder, likely related to  recent instrumentation. Specimen Collected: 08/11/23 11:57 EDT Last Resulted: 08/11/23 16:26 EDT             Cultures:     Blood Culture 1 [[id number]] Collected: 08/10/23 1109   Order Status: Completed Specimen: Blood Updated: 08/13/23 1145    Specimen Description . BLOOD    Special Requests RAC 10ML    Culture NO GROWTH 3 DAYS   Culture, Blood 2 [[id number]] Collected: 08/10/23 1109   Order Status: Completed Specimen: Blood Updated: 08/13/23 1145    Specimen Description . BLOOD    Special Requests LAC 10ML    Culture NO GROWTH 3 DAYS   Culture, Urine [[id number]] Collected: 08/10/23 1222   Order Status: Completed Specimen: Urine, straight catheter Updated: 08/11/23 1111    Specimen Description . URINE,STRAIGHT CATHETER    Culture NO SIGNIFICANT GROWTH     Medications:      propranolol  20 mg Oral BID    clonazePAM  1 mg Oral BID    atorvastatin  20 mg Oral Daily    sennosides-docusate sodium

## 2023-08-13 NOTE — PROGRESS NOTES
Physician Progress Note      PATIENT:               Emperatriz Seals  CSN #:                  [id number]  :                       1940  ADMIT DATE:       8/10/2023 10:58 AM  DISCH DATE:  RESPONDING  PROVIDER #:        Smita Murphy          QUERY TEXT:    Pt admitted with AMS. Pt noted to have UTI. If possible, please document in   the progress notes and discharge summary if you are evaluating and / or   treating any of the following: The medical record reflects the following:  Risk Factors: UTI, age of 80 years and OLIVA    Clinical Indicators: AMS UA with nitrites, moderate leukocyte esterase, WBC   20-50 and few bacteria. UC pending. CTH negative for acute process. OLIVA with   creatine 1.2 on admission. Treatment: IV Rocephin, Vanco. CTH. Labs and monitoring. Dana Tobar RN, [email or web address]  Options provided:  -- Metabolic encephalopathy secondary to UTI, now resolved  -- Metabolic encephalopathy secondary to UTI  -- Toxic metabolic encephalopathy secondary to UTI, now resolved. -- Toxic metabolic encephalopathy secondary to UTI.   -- Other - I will add my own diagnosis  -- Disagree - Not applicable / Not valid  -- Disagree - Clinically unable to determine / Unknown  -- Refer to Clinical Documentation Reviewer    PROVIDER RESPONSE TEXT:    Acute delirium due to Benzodiazepine withdrawal vs UTI    Query created by: Darrian Lyman on 2023 10:33 AM      Electronically signed by:  Smita Murphy 2023 11:39 AM

## 2023-08-13 NOTE — PLAN OF CARE
Problem: Discharge Planning  Goal: Discharge to home or other facility with appropriate resources  8/13/2023 0732 by Joanna Arriola RN  Outcome: Progressing  8/13/2023 0101 by Candi Chou RN  Outcome: Progressing     Problem: Skin/Tissue Integrity  Goal: Absence of new skin breakdown  Description: 1. Monitor for areas of redness and/or skin breakdown  2. Assess vascular access sites hourly  3. Every 4-6 hours minimum:  Change oxygen saturation probe site  4. Every 4-6 hours:  If on nasal continuous positive airway pressure, respiratory therapy assess nares and determine need for appliance change or resting period. 8/13/2023 0732 by Joanna Arriola RN  Outcome: Progressing  8/13/2023 0101 by Candi Chou RN  Outcome: Progressing     Problem: Safety - Adult  Goal: Free from fall injury  8/13/2023 0732 by Joanna Arriola RN  Outcome: Progressing  8/13/2023 0101 by Candi Chou RN  Outcome: Progressing     Problem: ABCDS Injury Assessment  Goal: Absence of physical injury  8/13/2023 0732 by Joanna Arriola RN  Outcome: Progressing  8/13/2023 0101 by Candi Chou RN  Outcome: Progressing     Problem: Confusion  Goal: Confusion, delirium, dementia, or psychosis is improved or at baseline  Description: INTERVENTIONS:  1. Assess for possible contributors to thought disturbance, including medications, impaired vision or hearing, underlying metabolic abnormalities, dehydration, psychiatric diagnoses, and notify attending LIP  2. [place] high risk fall precautions, as indicated  3. Provide frequent short contacts to provide reality reorientation, refocusing and direction  4. Decrease environmental stimuli, including noise as appropriate  5. Monitor and intervene to maintain adequate nutrition, hydration, elimination, sleep and activity  6. If unable to ensure safety without constant attention obtain sitter and review sitter guidelines with assigned personnel  7.  Initiate Psychosocial CNS and Spiritual Care consult,

## 2023-08-13 NOTE — PROGRESS NOTES
Pt BP throughout shift showed high due to pt anxiety and arm positioning. Retaken correctly and results were within pt baseline.

## 2023-08-14 LAB
ANION GAP SERPL CALCULATED.3IONS-SCNC: 9 MMOL/L (ref 9–17)
BASOPHILS # BLD: 0.04 K/UL (ref 0–0.2)
BASOPHILS NFR BLD: 1 % (ref 0–2)
BUN SERPL-MCNC: 5 MG/DL (ref 8–23)
CALCIUM SERPL-MCNC: 9.6 MG/DL (ref 8.6–10.4)
CHLORIDE SERPL-SCNC: 106 MMOL/L (ref 98–107)
CO2 SERPL-SCNC: 27 MMOL/L (ref 20–31)
CREAT SERPL-MCNC: 0.7 MG/DL (ref 0.5–0.9)
EKG ATRIAL RATE: 89 BPM
EKG ATRIAL RATE: 93 BPM
EKG P AXIS: 53 DEGREES
EKG P AXIS: 56 DEGREES
EKG P-R INTERVAL: 106 MS
EKG P-R INTERVAL: 132 MS
EKG Q-T INTERVAL: 380 MS
EKG Q-T INTERVAL: 392 MS
EKG QRS DURATION: 78 MS
EKG QRS DURATION: 80 MS
EKG QTC CALCULATION (BAZETT): 472 MS
EKG QTC CALCULATION (BAZETT): 476 MS
EKG R AXIS: 47 DEGREES
EKG R AXIS: 47 DEGREES
EKG T AXIS: 29 DEGREES
EKG T AXIS: 9 DEGREES
EKG VENTRICULAR RATE: 89 BPM
EKG VENTRICULAR RATE: 93 BPM
EOSINOPHIL # BLD: 0.07 K/UL (ref 0–0.44)
EOSINOPHILS RELATIVE PERCENT: 1 % (ref 1–4)
ERYTHROCYTE [DISTWIDTH] IN BLOOD BY AUTOMATED COUNT: 14.5 % (ref 11.8–14.4)
GFR SERPL CREATININE-BSD FRML MDRD: >60 ML/MIN/1.73M2
GLUCOSE SERPL-MCNC: 113 MG/DL (ref 70–99)
HCT VFR BLD AUTO: 33.8 % (ref 36.3–47.1)
HGB BLD-MCNC: 10.7 G/DL (ref 11.9–15.1)
IMM GRANULOCYTES # BLD AUTO: <0.03 K/UL (ref 0–0.3)
IMM GRANULOCYTES NFR BLD: 0 %
LYMPHOCYTES NFR BLD: 2.31 K/UL (ref 1.1–3.7)
LYMPHOCYTES RELATIVE PERCENT: 43 % (ref 24–43)
MCH RBC QN AUTO: 30.7 PG (ref 25.2–33.5)
MCHC RBC AUTO-ENTMCNC: 31.7 G/DL (ref 28.4–34.8)
MCV RBC AUTO: 97.1 FL (ref 82.6–102.9)
MONOCYTES NFR BLD: 0.56 K/UL (ref 0.1–1.2)
MONOCYTES NFR BLD: 11 % (ref 3–12)
NEUTROPHILS NFR BLD: 44 % (ref 36–65)
NEUTS SEG NFR BLD: 2.31 K/UL (ref 1.5–8.1)
NRBC BLD-RTO: 0 PER 100 WBC
PLATELET # BLD AUTO: 283 K/UL (ref 138–453)
PMV BLD AUTO: 9.6 FL (ref 8.1–13.5)
POTASSIUM SERPL-SCNC: 3.6 MMOL/L (ref 3.7–5.3)
RBC # BLD AUTO: 3.48 M/UL (ref 3.95–5.11)
RBC # BLD: ABNORMAL 10*6/UL
SODIUM SERPL-SCNC: 142 MMOL/L (ref 135–144)
WBC OTHER # BLD: 5.3 K/UL (ref 3.5–11.3)

## 2023-08-14 PROCEDURE — 95819 EEG AWAKE AND ASLEEP: CPT | Performed by: PSYCHIATRY & NEUROLOGY

## 2023-08-14 PROCEDURE — 51798 US URINE CAPACITY MEASURE: CPT

## 2023-08-14 PROCEDURE — 6370000000 HC RX 637 (ALT 250 FOR IP): Performed by: INTERNAL MEDICINE

## 2023-08-14 PROCEDURE — 6360000002 HC RX W HCPCS

## 2023-08-14 PROCEDURE — 2580000003 HC RX 258

## 2023-08-14 PROCEDURE — 95819 EEG AWAKE AND ASLEEP: CPT

## 2023-08-14 PROCEDURE — 36415 COLL VENOUS BLD VENIPUNCTURE: CPT

## 2023-08-14 PROCEDURE — 93010 ELECTROCARDIOGRAM REPORT: CPT | Performed by: INTERNAL MEDICINE

## 2023-08-14 PROCEDURE — 6370000000 HC RX 637 (ALT 250 FOR IP)

## 2023-08-14 PROCEDURE — 80048 BASIC METABOLIC PNL TOTAL CA: CPT

## 2023-08-14 PROCEDURE — 99232 SBSQ HOSP IP/OBS MODERATE 35: CPT | Performed by: INTERNAL MEDICINE

## 2023-08-14 PROCEDURE — 90792 PSYCH DIAG EVAL W/MED SRVCS: CPT | Performed by: PSYCHIATRY & NEUROLOGY

## 2023-08-14 PROCEDURE — 99233 SBSQ HOSP IP/OBS HIGH 50: CPT | Performed by: HOSPITALIST

## 2023-08-14 PROCEDURE — 4A10X4Z MONITORING OF CENTRAL NERVOUS ELECTRICAL ACTIVITY, EXTERNAL APPROACH: ICD-10-PCS | Performed by: PSYCHIATRY & NEUROLOGY

## 2023-08-14 PROCEDURE — 51701 INSERT BLADDER CATHETER: CPT

## 2023-08-14 PROCEDURE — 2060000000 HC ICU INTERMEDIATE R&B

## 2023-08-14 PROCEDURE — 85025 COMPLETE CBC W/AUTO DIFF WBC: CPT

## 2023-08-14 PROCEDURE — 93005 ELECTROCARDIOGRAM TRACING: CPT

## 2023-08-14 RX ORDER — CLONAZEPAM 0.5 MG/1
0.5 TABLET ORAL 2 TIMES DAILY
Status: DISCONTINUED | OUTPATIENT
Start: 2023-08-14 | End: 2023-08-18

## 2023-08-14 RX ADMIN — BUSPIRONE HYDROCHLORIDE 15 MG: 15 TABLET ORAL at 19:50

## 2023-08-14 RX ADMIN — BUSPIRONE HYDROCHLORIDE 15 MG: 15 TABLET ORAL at 14:07

## 2023-08-14 RX ADMIN — SODIUM CHLORIDE, PRESERVATIVE FREE 10 ML: 5 INJECTION INTRAVENOUS at 09:22

## 2023-08-14 RX ADMIN — SODIUM CHLORIDE, PRESERVATIVE FREE 10 ML: 5 INJECTION INTRAVENOUS at 19:51

## 2023-08-14 RX ADMIN — ENOXAPARIN SODIUM 40 MG: 100 INJECTION SUBCUTANEOUS at 09:21

## 2023-08-14 RX ADMIN — QUETIAPINE FUMARATE 25 MG: 25 TABLET ORAL at 19:50

## 2023-08-14 RX ADMIN — PROPRANOLOL HYDROCHLORIDE 20 MG: 20 TABLET ORAL at 19:50

## 2023-08-14 RX ADMIN — CLONAZEPAM 0.5 MG: 0.5 TABLET ORAL at 19:54

## 2023-08-14 RX ADMIN — DULOXETINE HYDROCHLORIDE 60 MG: 30 CAPSULE, DELAYED RELEASE ORAL at 19:50

## 2023-08-14 ASSESSMENT — ENCOUNTER SYMPTOMS
EYES NEGATIVE: 1
RESPIRATORY NEGATIVE: 1

## 2023-08-14 NOTE — PLAN OF CARE
From urology standpoint, lance can be removed. Will have nurse check post void residuals. If 350cc or greater, recommend straight cath once. If requiring straight cath again on next PVR check, recommend teaching patient how to straight cath per nursing, OR if patient unwilling or unable to perform this, recommend indwelling lance placement and follow up with urology in 1 week for catheter removal and void trial.    Thank you for allowing us to care for this patient. Please feel free to reach out with any further questions or concerns.     Stephon Wilson DO, PGY-4  Urology Resident

## 2023-08-14 NOTE — CARE COORDINATION
Transitional planning    Needs psych consult, from [facility], will need new precert.

## 2023-08-14 NOTE — PROCEDURES
EEG REPORT       Patient: Jonny Madera Age: 80 y.o. MRN: [id number]      Referring Provider: No ref. provider found    History: This routine 30 minute scalp EEG was recorded with video- monitoring for a 80 y.o. Cesia Lacy female who presented with encephalopathy. This EEG was performed to evaluate for focal and epileptiform abnormalities.      Ana Luisa Herzog   Current Facility-Administered Medications   Medication Dose Route Frequency Provider Last Rate Last Admin    clonazePAM (KLONOPIN) tablet 0.5 mg  0.5 mg Oral BID Dre Prescott MD        propranolol (INDERAL) tablet 20 mg  20 mg Oral BID Trice Ellis MD   20 mg at 08/13/23 2033    labetalol (NORMODYNE;TRANDATE) injection 10 mg  10 mg IntraVENous Q4H PRN James Perdomo MD   10 mg at 08/13/23 0946    glucose chewable tablet 16 g  4 tablet Oral PRN Angelia Kelsey MD        dextrose bolus 10% 125 mL  125 mL IntraVENous PRN Angelia Kelsey MD        Or    dextrose bolus 10% 250 mL  250 mL IntraVENous PRN Angelia Kelsey MD        glucagon (rDNA) injection 1 mg  1 mg SubCUTAneous PRN Angelia Kelsey MD        dextrose 10 % infusion   IntraVENous Continuous PRPERI Kelsey MD        atorvastatin (LIPITOR) tablet 20 mg  20 mg Oral Daily Dre Prescott MD   20 mg at 08/13/23 0804    sennosides-docusate sodium (SENOKOT-S) 8.6-50 MG tablet 2 tablet  2 tablet Oral Daily Trice Ellis MD   2 tablet at 08/13/23 0803    busPIRone (BUSPAR) tablet 15 mg  15 mg Oral TID Dre Prescott MD   15 mg at 08/14/23 1407    DULoxetine (CYMBALTA) extended release capsule 60 mg  60 mg Oral BID Dre Prescott MD   60 mg at 08/13/23 2033    haloperidol lactate (HALDOL) injection 5 mg  5 mg IntraMUSCular Q6H PRN James Perdomo MD   5 mg at 08/13/23 1950    sodium chloride flush 0.9 % injection 5-40 mL  5-40 mL IntraVENous 2 times per day James Perdomo MD   10 mL at

## 2023-08-14 NOTE — PROGRESS NOTES
[facility]  Internal Medicine Teaching Residency Program  Inpatient Daily Progress Note  ______________________________________________________________________________    Patient: Aminata El  YOB: 1940   SWV:[id number]    Acct: [de-identified]     Room: [address] [place] [address]  Admit date: 8/10/2023  Today's date: 08/14/23  Number of days in the hospital: 4    SUBJECTIVE   Admitting Diagnosis: Delirium  CC: Altered Mental status    Pt examined at bedside. Chart & results reviewed. -Patient is disoriented and calm this morning  -Heart rate is in 90s and 100s and blood pressures came down to be normal morning, her last reading is 116/77     ROS:  Review of Systems   Constitutional:  Positive for activity change and appetite change. HENT: Negative. Eyes: Negative. Respiratory: Negative. BRIEF HISTORY     The patient is a 80 y.o. female with past medical history of generalized anxiety disorder, parkinsonism, colitis, acute urinary retention, OLIVA presented  with altered mental status. As per her son the patient is being disoriented for more than 24 hours in the nursing facility. As per the medical record she was admitted in the hospital on 7/2/2023 for the management of generalized anxiety disorder. The patient had repeated episodes of anxiety and tachycardia throughout her hospital stay and she was managed with Ativan and Seroquel as needed, later it was changed to Klonopin by psychiatrist.  During her hospital stay she had a colitis episode for which she was treated with antibiotics and was catheterized with the [place] for acute urinary retention and she was asked to follow-up with urology outpatient for [facility]. On 7/27/2023 patient presented to the urology office for evaluation of urinary retention, at that time as per urology note the patient's urinary catheter was not in place and it fell out.   Additionally according to that note the patient

## 2023-08-14 NOTE — PLAN OF CARE
Problem: Discharge Planning  Goal: Discharge to home or other facility with appropriate resources  8/13/2023 2107 by Sukhjinder Ricketts RN  Outcome: Progressing  8/13/2023 0732 by Siddharth Chopra RN  Outcome: Progressing     Problem: Skin/Tissue Integrity  Goal: Absence of new skin breakdown  Description: 1. Monitor for areas of redness and/or skin breakdown  2. Assess vascular access sites hourly  3. Every 4-6 hours minimum:  Change oxygen saturation probe site  4. Every 4-6 hours:  If on nasal continuous positive airway pressure, respiratory therapy assess nares and determine need for appliance change or resting period. 8/13/2023 2107 by Sukhjinder Ricketts RN  Outcome: Progressing  8/13/2023 0732 by Siddharth Chopra RN  Outcome: Progressing     Problem: Safety - Adult  Goal: Free from fall injury  8/13/2023 2107 by Sukhjinder Ricketts RN  Outcome: Progressing  8/13/2023 0732 by Siddharth Chopra RN  Outcome: Progressing     Problem: ABCDS Injury Assessment  Goal: Absence of physical injury  8/13/2023 2107 by Sukhjinder Ricketts RN  Outcome: Progressing  8/13/2023 0732 by Siddharth Chopra RN  Outcome: Progressing     Problem: Confusion  Goal: Confusion, delirium, dementia, or psychosis is improved or at baseline  Description: INTERVENTIONS:  1. Assess for possible contributors to thought disturbance, including medications, impaired vision or hearing, underlying metabolic abnormalities, dehydration, psychiatric diagnoses, and notify attending LIP  2. [place] high risk fall precautions, as indicated  3. Provide frequent short contacts to provide reality reorientation, refocusing and direction  4. Decrease environmental stimuli, including noise as appropriate  5. Monitor and intervene to maintain adequate nutrition, hydration, elimination, sleep and activity  6. If unable to ensure safety without constant attention obtain sitter and review sitter guidelines with assigned personnel  7.  Initiate Psychosocial CNS and Spiritual Care consult,

## 2023-08-14 NOTE — PLAN OF CARE
Problem: Discharge Planning  Goal: Discharge to home or other facility with appropriate resources  Outcome: Progressing  Flowsheets (Taken 8/14/2023 0400 by Anupama Parr RN)  Discharge to home or other facility with appropriate resources:   Identify barriers to discharge with patient and caregiver   Arrange for needed discharge resources and transportation as appropriate   Identify discharge learning needs (meds, wound care, etc)   Refer to discharge planning if patient needs post-hospital services based on physician order or complex needs related to functional status, cognitive ability or social support system     Problem: Skin/Tissue Integrity  Goal: Absence of new skin breakdown  Description: 1. Monitor for areas of redness and/or skin breakdown  2. Assess vascular access sites hourly  3. Every 4-6 hours minimum:  Change oxygen saturation probe site  4. Every 4-6 hours:  If on nasal continuous positive airway pressure, respiratory therapy assess nares and determine need for appliance change or resting period. Outcome: Progressing     Problem: Safety - Adult  Goal: Free from fall injury  Outcome: Progressing     Problem: ABCDS Injury Assessment  Goal: Absence of physical injury  Outcome: Progressing     Problem: Confusion  Goal: Confusion, delirium, dementia, or psychosis is improved or at baseline  Description: INTERVENTIONS:  1. Assess for possible contributors to thought disturbance, including medications, impaired vision or hearing, underlying metabolic abnormalities, dehydration, psychiatric diagnoses, and notify attending LIP  2. [place] high risk fall precautions, as indicated  3. Provide frequent short contacts to provide reality reorientation, refocusing and direction  4. Decrease environmental stimuli, including noise as appropriate  5. Monitor and intervene to maintain adequate nutrition, hydration, elimination, sleep and activity  6.  If unable to ensure safety without constant attention

## 2023-08-14 NOTE — CONSULTS
Department of Psychiatry   Psychiatric Assessment      Thank you very much for allowing us to participate in the care of this patient. Reason for Consult: Delirium    HISTORY OF PRESENT ILLNESS:          The patient is a 80 y.o. female with significant history of anxiety who is admitted medically for delirium secondary to UTI. Per report patient was extremely tachycardic agitated and delirious in the emergency department. She does have history of recurrent UTIs. I have reviewed patient's medications and we have evaluated her as a consult during last visit. During that visit patient was extremely anxious dealing with significant panic attacks leading to her starting her on low-dose of Klonopin. It appears like her Klonopin was increased when she [facility]. Staff reported the patient continues to have fluctuating cognition. When I approached her she was extremely irritable and is only oriented to place. She was requesting me to leave the room. Mentions that her anxiety is well-controlled on the current combination of medications. Staff mentioned that she did receive some emergency medications yesterday and the primary team discontinued Ativan. PSYCHIATRIC HISTORY:      Outpatient psychiatric provider: Patient endorses being under the care of somebody at [address] attempts: Denies  Inpatient psychiatric admissions: Denies    Past psychiatric medications includes:     Cymbalta, olanzapine, BuSpar, Ativan, Xanax  Adverse reactions from psychotropic medications:    Patient denies      [facility] Psychiatric Review of Systems attempted but patient not reliable       Obsessions and Compulsions: Denies       [place] or Hypomania: Denies     Hallucinations: Denies     Panic Attacks:  Denies     Delusions:  Denies     Phobias:  Denies     Trauma: Denies    Prior to Admission medications    Medication Sig Start Date End Date Taking?  Authorizing Provider   QUEtiapine (SEROQUEL) 25 MG tablet Take 1 tablet by mouth daily as needed for Agitation  Patient not taking: Reported on 7/27/2023 7/9/23 8/8/23  Shady Gonzales MD   clonazePAM (KLONOPIN) 1 MG tablet Take 1 tablet by mouth 2 times daily as needed for Anxiety for up to 30 days.  Max Daily Amount: 2 mg 7/9/23 8/8/23  Shady Gonzales MD   propranolol (INDERAL) 10 MG tablet Take 1 tablet by mouth 2 times daily 7/9/23   Shady Gonzales MD   atorvastatin (LIPITOR) 20 MG tablet Take 1 tablet by mouth daily 7/9/23   Shady Gonzales MD   busPIRone (BUSPAR) 15 MG tablet Take 15 mg by mouth 3 times daily 7/9/23   Shady Gonzales MD   DULoxetine (CYMBALTA) 60 MG extended release capsule Take 1 capsule by mouth in the morning and at bedtime 7/9/23   Shady Gonzales MD        Medications:    Current Facility-Administered Medications: propranolol (INDERAL) tablet 20 mg, 20 mg, Oral, BID  labetalol (NORMODYNE;TRANDATE) injection 10 mg, 10 mg, IntraVENous, Q4H PRN  clonazePAM (KLONOPIN) tablet 1 mg, 1 mg, Oral, BID  glucose chewable tablet 16 g, 4 tablet, Oral, PRN  dextrose bolus 10% 125 mL, 125 mL, IntraVENous, PRN **OR** dextrose bolus 10% 250 mL, 250 mL, IntraVENous, PRN  glucagon (rDNA) injection 1 mg, 1 mg, SubCUTAneous, PRN  dextrose 10 % infusion, , IntraVENous, Continuous PRN  atorvastatin (LIPITOR) tablet 20 mg, 20 mg, Oral, Daily  sennosides-docusate sodium (SENOKOT-S) 8.6-50 MG tablet 2 tablet, 2 tablet, Oral, Daily  busPIRone (BUSPAR) tablet 15 mg, 15 mg, Oral, TID  DULoxetine (CYMBALTA) extended release capsule 60 mg, 60 mg, Oral, BID  haloperidol lactate (HALDOL) injection 5 mg, 5 mg, IntraMUSCular, Q6H PRN  sodium chloride flush 0.9 % injection 5-40 mL, 5-40 mL, IntraVENous, 2 times per day  sodium chloride flush 0.9 % injection 5-40 mL, 5-40 mL, IntraVENous, PRN  0.9 % sodium chloride infusion, , IntraVENous, PRN  enoxaparin (LOVENOX) injection 40 mg, 40 mg, SubCUTAneous, Daily  ondansetron (ZOFRAN-ODT)

## 2023-08-14 NOTE — PROGRESS NOTES
[facility] - Progress Note    Today's Date and Time: 8/14/2023, 11:10 AM    Impression :   Altered mentation  Recent hx MRSA UTI on 7/27/23-Treated with Macrobid  Urinary retention  Hypokalemia  GERD  Anxiety  Parkinson's    Recommendations:   D/C IV Vancomycin- No significant growth on urine culture from 8/10/23  Urology recommendations    Medical Decision Making/Summary/Discussion:8/14/2023       Infection Control Recommendations   [place] Precautions  Contact Precautions for MRSA hx    Antimicrobial Stewardship Recommendations     Discontinuation of therapy  Coordination of Outpatient Care:   Estimated Length of IV antimicrobials:TBD  Patient will need Midline Catheter Insertion: TBD  Patient will need PICC line Insertion:TBD  Patient will need: Home IV , [address] [place],  SNF,  LTAC: TBD  Patient will need outpatient wound care: No    Chief complaint/reason for consultation:   MRSA UTI      History of Present Illness:   Alicia Galloway is a 80y.o.-year-old  female who was initially admitted on 8/10/2023. Patient seen at the request of Dr. Brandy Crews:    This patient, who sees Dr. Carol Ann Yates, with Urology, for urinary retention, and was treated for a MRSA UTI with Macrobid at the end of July 2023, presented to the ED from her nursing home with confusion x 24 hrs on 8/10/23. She has a history of anxiety that is treated with Klonopin. Her right pupil was noted to be dilated. A CT of her head was negative for any acute abnormalities. Labwork was mostly unremarkable other than hypokalemia and slightly elevated creatinine. The patient was given fluids and was administered a dose of IV Rocephin and was initiated on IV Vancomycin. Blood cultures have yielded no growth thus far and the urine culture shows no significant growth. Urology was consulted and a CT abd/pelvis was ordered.      ID was consulted for patient evaluation and

## 2023-08-14 NOTE — PROGRESS NOTES
Occupational [address]  Occupational Therapy Not Seen Note    DATE: 2023    NAME: Randi Hoang  MRN: [id number]   : 1940      Patient not seen this date for Occupational Therapy due to:    Patient Declined: Refusal for all OOB suggestions and also declining writer opening up window-shades, \"it's too early for this! \" Pt states despite it being 5 pm.    Next Scheduled Treatment: Attempt on 8/15 as appropriate.     Electronically signed by Supriya Swann OT on 2023 at 4:54 PM [FreeTextEntry1] : PSG mild with desaturation down to 90%\par worse in REm sleep\par worse in supine sleep

## 2023-08-15 LAB
ANION GAP SERPL CALCULATED.3IONS-SCNC: 7 MMOL/L (ref 9–17)
BASOPHILS # BLD: 0.04 K/UL (ref 0–0.2)
BASOPHILS NFR BLD: 1 % (ref 0–2)
BUN SERPL-MCNC: 7 MG/DL (ref 8–23)
CALCIUM SERPL-MCNC: 9.4 MG/DL (ref 8.6–10.4)
CHLORIDE SERPL-SCNC: 107 MMOL/L (ref 98–107)
CO2 SERPL-SCNC: 25 MMOL/L (ref 20–31)
CREAT SERPL-MCNC: 0.6 MG/DL (ref 0.5–0.9)
EKG ATRIAL RATE: 76 BPM
EKG P AXIS: 45 DEGREES
EKG P-R INTERVAL: 116 MS
EKG Q-T INTERVAL: 434 MS
EKG QRS DURATION: 84 MS
EKG QTC CALCULATION (BAZETT): 488 MS
EKG R AXIS: 36 DEGREES
EKG T AXIS: 14 DEGREES
EKG VENTRICULAR RATE: 76 BPM
EOSINOPHIL # BLD: 0.06 K/UL (ref 0–0.44)
EOSINOPHILS RELATIVE PERCENT: 1 % (ref 1–4)
ERYTHROCYTE [DISTWIDTH] IN BLOOD BY AUTOMATED COUNT: 14.6 % (ref 11.8–14.4)
GFR SERPL CREATININE-BSD FRML MDRD: >60 ML/MIN/1.73M2
GLUCOSE SERPL-MCNC: 103 MG/DL (ref 70–99)
HCT VFR BLD AUTO: 33.1 % (ref 36.3–47.1)
HGB BLD-MCNC: 10.3 G/DL (ref 11.9–15.1)
IMM GRANULOCYTES # BLD AUTO: <0.03 K/UL (ref 0–0.3)
IMM GRANULOCYTES NFR BLD: 0 %
LYMPHOCYTES NFR BLD: 2.17 K/UL (ref 1.1–3.7)
LYMPHOCYTES RELATIVE PERCENT: 43 % (ref 24–43)
MAGNESIUM SERPL-MCNC: 1.7 MG/DL (ref 1.6–2.6)
MCH RBC QN AUTO: 31.1 PG (ref 25.2–33.5)
MCHC RBC AUTO-ENTMCNC: 31.1 G/DL (ref 28.4–34.8)
MCV RBC AUTO: 100 FL (ref 82.6–102.9)
MICROORGANISM SPEC CULT: NORMAL
MICROORGANISM SPEC CULT: NORMAL
MONOCYTES NFR BLD: 0.41 K/UL (ref 0.1–1.2)
MONOCYTES NFR BLD: 8 % (ref 3–12)
NEUTROPHILS NFR BLD: 47 % (ref 36–65)
NEUTS SEG NFR BLD: 2.38 K/UL (ref 1.5–8.1)
NRBC BLD-RTO: 0 PER 100 WBC
PLATELET # BLD AUTO: 251 K/UL (ref 138–453)
PMV BLD AUTO: 9.6 FL (ref 8.1–13.5)
POTASSIUM SERPL-SCNC: 3.3 MMOL/L (ref 3.7–5.3)
RBC # BLD AUTO: 3.31 M/UL (ref 3.95–5.11)
RBC # BLD: ABNORMAL 10*6/UL
SERVICE CMNT-IMP: NORMAL
SERVICE CMNT-IMP: NORMAL
SODIUM SERPL-SCNC: 139 MMOL/L (ref 135–144)
SPECIMEN DESCRIPTION: NORMAL
SPECIMEN DESCRIPTION: NORMAL
WBC OTHER # BLD: 5.1 K/UL (ref 3.5–11.3)

## 2023-08-15 PROCEDURE — 85025 COMPLETE CBC W/AUTO DIFF WBC: CPT

## 2023-08-15 PROCEDURE — 6370000000 HC RX 637 (ALT 250 FOR IP): Performed by: INTERNAL MEDICINE

## 2023-08-15 PROCEDURE — 80048 BASIC METABOLIC PNL TOTAL CA: CPT

## 2023-08-15 PROCEDURE — 99232 SBSQ HOSP IP/OBS MODERATE 35: CPT | Performed by: HOSPITALIST

## 2023-08-15 PROCEDURE — 83735 ASSAY OF MAGNESIUM: CPT

## 2023-08-15 PROCEDURE — 99232 SBSQ HOSP IP/OBS MODERATE 35: CPT | Performed by: INTERNAL MEDICINE

## 2023-08-15 PROCEDURE — 6360000002 HC RX W HCPCS

## 2023-08-15 PROCEDURE — 6370000000 HC RX 637 (ALT 250 FOR IP)

## 2023-08-15 PROCEDURE — 36415 COLL VENOUS BLD VENIPUNCTURE: CPT

## 2023-08-15 PROCEDURE — 97116 GAIT TRAINING THERAPY: CPT

## 2023-08-15 PROCEDURE — 97166 OT EVAL MOD COMPLEX 45 MIN: CPT

## 2023-08-15 PROCEDURE — 51701 INSERT BLADDER CATHETER: CPT

## 2023-08-15 PROCEDURE — 93010 ELECTROCARDIOGRAM REPORT: CPT | Performed by: INTERNAL MEDICINE

## 2023-08-15 PROCEDURE — 97530 THERAPEUTIC ACTIVITIES: CPT

## 2023-08-15 PROCEDURE — 51798 US URINE CAPACITY MEASURE: CPT

## 2023-08-15 PROCEDURE — 2060000000 HC ICU INTERMEDIATE R&B

## 2023-08-15 PROCEDURE — 2580000003 HC RX 258

## 2023-08-15 PROCEDURE — 97535 SELF CARE MNGMENT TRAINING: CPT

## 2023-08-15 RX ORDER — POTASSIUM CHLORIDE 20 MEQ/1
40 TABLET, EXTENDED RELEASE ORAL ONCE
Status: COMPLETED | OUTPATIENT
Start: 2023-08-15 | End: 2023-08-15

## 2023-08-15 RX ORDER — POTASSIUM CHLORIDE 20 MEQ/1
20 TABLET, EXTENDED RELEASE ORAL ONCE
Status: COMPLETED | OUTPATIENT
Start: 2023-08-15 | End: 2023-08-15

## 2023-08-15 RX ORDER — POTASSIUM CHLORIDE 20 MEQ/1
40 TABLET, EXTENDED RELEASE ORAL ONCE
Status: DISCONTINUED | OUTPATIENT
Start: 2023-08-15 | End: 2023-08-15

## 2023-08-15 RX ADMIN — POTASSIUM CHLORIDE 20 MEQ: 1500 TABLET, EXTENDED RELEASE ORAL at 12:43

## 2023-08-15 RX ADMIN — PROPRANOLOL HYDROCHLORIDE 20 MG: 20 TABLET ORAL at 19:49

## 2023-08-15 RX ADMIN — SODIUM CHLORIDE, PRESERVATIVE FREE 10 ML: 5 INJECTION INTRAVENOUS at 19:51

## 2023-08-15 RX ADMIN — PROPRANOLOL HYDROCHLORIDE 20 MG: 20 TABLET ORAL at 08:50

## 2023-08-15 RX ADMIN — ENOXAPARIN SODIUM 40 MG: 100 INJECTION SUBCUTANEOUS at 08:50

## 2023-08-15 RX ADMIN — BUSPIRONE HYDROCHLORIDE 15 MG: 15 TABLET ORAL at 08:49

## 2023-08-15 RX ADMIN — POTASSIUM CHLORIDE 40 MEQ: 1500 TABLET, EXTENDED RELEASE ORAL at 08:53

## 2023-08-15 RX ADMIN — DULOXETINE HYDROCHLORIDE 60 MG: 30 CAPSULE, DELAYED RELEASE ORAL at 19:49

## 2023-08-15 RX ADMIN — SODIUM CHLORIDE, PRESERVATIVE FREE 10 ML: 5 INJECTION INTRAVENOUS at 08:53

## 2023-08-15 RX ADMIN — BUSPIRONE HYDROCHLORIDE 15 MG: 15 TABLET ORAL at 19:49

## 2023-08-15 RX ADMIN — CLONAZEPAM 0.5 MG: 0.5 TABLET ORAL at 19:49

## 2023-08-15 RX ADMIN — STANDARDIZED SENNA CONCENTRATE AND DOCUSATE SODIUM 2 TABLET: 8.6; 5 TABLET ORAL at 08:50

## 2023-08-15 RX ADMIN — ATORVASTATIN CALCIUM 20 MG: 20 TABLET, FILM COATED ORAL at 08:50

## 2023-08-15 RX ADMIN — CLONAZEPAM 0.5 MG: 0.5 TABLET ORAL at 08:50

## 2023-08-15 RX ADMIN — DULOXETINE HYDROCHLORIDE 60 MG: 30 CAPSULE, DELAYED RELEASE ORAL at 08:49

## 2023-08-15 RX ADMIN — QUETIAPINE FUMARATE 25 MG: 25 TABLET ORAL at 19:49

## 2023-08-15 RX ADMIN — BUSPIRONE HYDROCHLORIDE 15 MG: 15 TABLET ORAL at 13:32

## 2023-08-15 NOTE — CARE COORDINATION
Transitional planning    Writer to room to discuss plan to return to [facility] on discharge, patient agreeable. Call to [facility] (admissions) , she will initiate precert.

## 2023-08-15 NOTE — PROGRESS NOTES
[facility] - Progress Note    Today's Date and Time: 8/15/2023, 11:31 AM    Impression :   Altered mentation  Recent hx MRSA UTI on 7/27/23-Treated with Macrobid  Urinary retention  Hypokalemia  GERD  Anxiety  Parkinson's    Recommendations:   Monitor off antibiotics   D/C IV Vancomycin- No significant growth on urine culture from 8/10/23  Urology recommendations    Medical Decision Making/Summary/Discussion:8/15/2023       Infection Control Recommendations   [place] Precautions  Contact Precautions for MRSA hx    Antimicrobial Stewardship Recommendations     Discontinuation of therapy  Coordination of Outpatient Care:   Estimated Length of IV antimicrobials:TBD  Patient will need Midline Catheter Insertion: TBD  Patient will need PICC line Insertion:TBD  Patient will need: Home IV , [address] [place],  SNF,  LTAC: TBD  Patient will need outpatient wound care: No    Chief complaint/reason for consultation:   MRSA UTI      History of Present Illness:   Jeovanny Guzman is a 80y.o.-year-old  female who was initially admitted on 8/10/2023. Patient seen at the request of Dr. Ayleen Agarwal:    This patient, who sees Dr. Julienne Hodge, with Urology, for urinary retention, and was treated for a MRSA UTI with Macrobid at the end of July 2023, presented to the ED from her nursing home with confusion x 24 hrs on 8/10/23. She has a history of anxiety that is treated with Klonopin. Her right pupil was noted to be dilated. A CT of her head was negative for any acute abnormalities. Labwork was mostly unremarkable other than hypokalemia and slightly elevated creatinine. The patient was given fluids and was administered a dose of IV Rocephin and was initiated on IV Vancomycin. Blood cultures have yielded no growth thus far and the urine culture shows no significant growth. Urology was consulted and a CT abd/pelvis was ordered.      ID was consulted for as teenager    TONSILLECTOMY      UPPER GASTROINTESTINAL ENDOSCOPY  04/25/2014       Medications:      clonazePAM  0.5 mg Oral BID    propranolol  20 mg Oral BID    atorvastatin  20 mg Oral Daily    sennosides-docusate sodium  2 tablet Oral Daily    busPIRone  15 mg Oral TID    DULoxetine  60 mg Oral BID    sodium chloride flush  5-40 mL IntraVENous 2 times per day    enoxaparin  40 mg SubCUTAneous Daily    QUEtiapine  25 mg Oral Nightly       Social History:     Social History     Socioeconomic History    Marital status: Single     Spouse name: Not on file    Number of children: Not on file    Years of education: Not on file    Highest education level: Not on file   Occupational History     Employer: [address] [place] [address]   Tobacco Use    Smoking status: Never    Smokeless tobacco: Never   Substance and Sexual Activity    Alcohol use: No     Comment: very little    Drug use: No    Sexual activity: Not on file   Other Topics Concern    Not on file   Social History Narrative    Not on file     Social Determinants of Health     Financial Resource Strain: Low Risk     Difficulty of Paying Living Expenses: Not hard at all   Food Insecurity: No Food Insecurity    Worried About Running Out of Food in the Last Year: Never true    [address] in the Last Year: Never true   Transportation Needs: Unmet Transportation Needs    Lack of Transportation (Medical): Not on file    Lack of Transportation (Non-Medical): Yes   Physical Activity: Not on file   Stress: Not on file   Social Connections: Not on file   Intimate Partner Violence: Not on file   Housing Stability: High Risk    Unable to Pay for Housing in the Last Year: Not on file    Number of Places Lived in the Last Year: Not on file    Unstable Housing in the Last Year: Yes       Family History:     Family History   Problem Relation Age of Onset    Stroke Father         Allergies:   Patient has no known allergies.      Review of Systems:   Unable to assess Culture NO GROWTH 4 DAYS    Culture, Blood 2 [[id number]] Collected: 08/10/23 1109    Order Status: Completed Specimen: Blood Updated: 08/14/23 1145     Specimen Description . BLOOD     Special Requests LAC 10ML     Culture NO GROWTH 4 DAYS          Contains abnormal data Culture, Urine  Order: [id number]  Status: Final result     Visible to patient: Yes (not seen)     Next appt: 09/07/2023 at 09:15 AM in Family Medicine ([address], [place])     Dx: Dysuria     Specimen Information: Urine, straight catheter   2 Result Notes      Component    Specimen Description . URINE,STRAIGHT CATHETER    Culture METHICILLIN RESISTANT STAPHYLOCOCCUS AUREUS >100,000 CFU/ML Abnormal     Resulting [address]        Susceptibility    Methicillin-Resistant Staphylococcus aureus (1)    Antibiotic Interpretation Microscan Method Status    penicillin Resistant >=0.5 BACTERIAL SUSCEPTIBILITY PANEL SIDDHARTH Final    gentamicin Sensitive <=0.5 BACTERIAL SUSCEPTIBILITY PANEL SIDDHARTH Final     Gentamicin is used only in combination with other active agents that test susceptible. levofloxacin Intermediate 4 BACTERIAL SUSCEPTIBILITY PANEL SIDDHARTH Final    nitrofurantoin Sensitive <=16 BACTERIAL SUSCEPTIBILITY PANEL SIDDHARTH Final    oxacillin Resistant >=4 BACTERIAL SUSCEPTIBILITY PANEL SIDDHARTH Final    tetracycline Sensitive <=1 BACTERIAL SUSCEPTIBILITY PANEL SIDDHARTH Final    trimethoprim-sulfamethoxazole Sensitive <=10 BACTERIAL SUSCEPTIBILITY PANEL SIDDHARTH Final    vancomycin Sensitive 1 BACTERIAL SUSCEPTIBILITY PANEL SIDDHARTH Final                 Medical Decision Making-Other:     Note:    Thank you for allowing us to participate in the care of this patient. Please call with questions. Светлана Murdock MD          ATTESTATION:    I have discussed the case, including pertinent history and exam findings with the medical resident.  I have evaluated the  History, physical findings and pictures of the patient and the key elements of the encounter have

## 2023-08-15 NOTE — PROGRESS NOTES
Occupational Therapy  [facility] [address] [facility]  Occupational Therapy Initial Assessment    Name: Chris Arellano  : 1940  MRN: [id number]  Date of Service: 8/15/2023    Discharge Recommendations:  Patient would benefit from continued therapy after discharge     Copied from Internal Medicine: The patient is a 80 y.o. female with past medical history of generalized anxiety disorder, parkinsonism, colitis, acute urinary retention, OLIVA presented  with altered mental status. As per her son the patient is being disoriented for more than 24 hours in the nursing facility. As per the medical record she was admitted in the hospital on 2023 for the management of generalized anxiety disorder. The patient had repeated episodes of anxiety and tachycardia throughout her hospital stay and she was managed with Ativan and Seroquel as needed, later it was changed to Klonopin by psychiatrist.  During her hospital stay she had a colitis episode for which she was treated with antibiotics and was catheterized with the Croft for acute urinary retention and she was asked to follow-up with urology outpatient for [facility]. On 2023 patient presented to the urology office for evaluation of urinary retention, at that time as per urology note the patient's urinary catheter was not in place and it fell out. Additionally according to that note the patient had decreased urinary stream, urinary incontinence and dysuria. A bladder scan was done on the same day revealed PVR of 74 mL and straight cath was done and urine was sent for culture sensitivity. she was put on Macrobid 1 p.o. twice daily for 10 days and Pyridium 200 mg p.o. 3 times daily for 3 days. The urine culture sensitivity showed MRSA. Patient Diagnosis(es): The primary encounter diagnosis was Septicemia ([facility]).  Diagnoses of Altered mental status, unspecified altered mental status type and Acute cystitis without hematuria were also pertinent to this Inpatient Daily Activity Raw Score: 17 (08/15/23 1140)  AM-PAC Inpatient ADL T-Scale Score : 37.26 (08/15/23 1140)  ADL Inpatient CMS 0-100% Score: 50.11 (08/15/23 1140)  ADL Inpatient CMS G-Code Modifier : CK (08/15/23 1140)    Goals  Short Term Goals  Time Frame for Short Term Goals: Pt will, by discharge:  Short Term Goal 1: Dem I with bed mobility  Short Term Goal 2: Dem cga for functional transfers for daily occupations  Short Term Goal 3: Dem SBA for dynamic mobility with least restrictive device for household distance  Short Term Goal 4: Dem fair safety awareness tech during functional transfers with verbal cuing as needed  Short Term Goal 5: Dem a 5 min static standing task with sba to increase activity tolerance for adls       Therapy Time   Individual Concurrent Group Co-treatment   Time In 0955         Time Out 1041         Minutes 46         Timed Code Treatment Minutes: [facility] OTR/L

## 2023-08-15 NOTE — PROGRESS NOTES
[facility]  Internal Medicine Teaching Residency Program  Inpatient Daily Progress Note  ______________________________________________________________________________    Patient: Elliot Quinones  YOB: 1940   [id number]:[id number]    Acct: [de-identified]     Room: [address] [place] [address]-  Admit date: 8/10/2023  Today's date: 08/15/23  Number of days in the hospital: 5    SUBJECTIVE   Admitting Diagnosis: Delirium  CC: Altered Mental status    Pt examined at bedside. Chart & results reviewed. -Patient is disoriented and agitated this morning  -Psychiatry on board   -Neurology on board, EEG done.  -Heart rate is in 90s and 100s and blood pressures are high, her last reading is 172/92    ROS:  Unable to review her systems because she is noncooperative    BRIEF HISTORY     The patient is a 80 y.o. female with past medical history of generalized anxiety disorder, parkinsonism, colitis, acute urinary retention, OLIVA presented  with altered mental status. As per her son the patient is being disoriented for more than 24 hours in the nursing facility. As per the medical record she was admitted in the hospital on 7/2/2023 for the management of generalized anxiety disorder. The patient had repeated episodes of anxiety and tachycardia throughout her hospital stay and she was managed with Ativan and Seroquel as needed, later it was changed to Klonopin by psychiatrist.  During her hospital stay she had a colitis episode for which she was treated with antibiotics and was catheterized with the Croft for acute urinary retention and she was asked to follow-up with urology outpatient for [facility]. On 7/27/2023 patient presented to the urology office for evaluation of urinary retention, at that time as per urology note the patient's urinary catheter was not in place and it fell out.   Additionally according to that note the patient had decreased urinary stream, urinary incontinence and -Dietitian on board recommended high kilocalories high-protein ONS twice daily  -Encourage p.o. intake    DVT ppx: Lovenox 40 Mg  GI ppx: Not indicated  Diet: No diet orders on file    PT/OT : Consulted  Discharge planning: Planning for discharge    Lora Clinton MD  Internal Medicine Resident, PGY-1  [facility], [facility].  8/15/2023, 2:38 AM

## 2023-08-15 NOTE — DISCHARGE INSTR - COC
Continuity of Care Form    Patient Name: Jonny Madera   :  1940  MRN:  [id number]    Admit date:  8/10/2023  Discharge date:  23    Code Status Order: Full Code   Advance Directives:     Admitting Physician:  Wil Verma MD  PCP: Jose Farrell MD    Discharging Nurse: Jackie Mendoza, [address] Unit/Room#: 0315/0315-01  Discharging Unit Phone Number: [phone]    Emergency Contact:   Extended Emergency Contact Information  Primary Emergency Contact: [address] [facility] Phone: [phone]  Relation: Child  Secondary Emergency Contact: [facility] Phone: [phone]  Relation: Child    Past Surgical History:  Past Surgical History:   Procedure Laterality Date    BREAST BIOPSY Right 2010    COCCYX REMOVAL  1950    COLONOSCOPY  2011    COLONOSCOPY  2014    ENDOSCOPY, COLON, DIAGNOSTIC      EYE SURGERY      injury to eye    SPINE SURGERY      tailbone removed as teenager    TONSILLECTOMY      UPPER GASTROINTESTINAL ENDOSCOPY  2014       Immunization History:   Immunization History   Administered Date(s) Administered    Influenza A (T5C4-67) Vaccine PF IM 2009    Influenza Vaccine, unspecified formulation 2014    Influenza Virus Vaccine 2013    Influenza Whole 2013, 10/28/2015    Influenza, FLUAD, (age 72 y+), Adjuvanted, 0.5mL 10/04/2022    Pneumococcal Conjugate 7-valent (Dana Ulices) 2007    Pneumococcal, PPSV23, PNEUMOVAX 23, (age 2y+), SC/IM, 0.5mL 2014    Td, unspecified formulation 2015    Tetanus 2005       Active Problems:  Patient Active Problem List   Diagnosis Code    Lesion of liver K76.9    Unintentional weight loss of more than 10% body weight within 6 months R63.4    OUSMANE (generalized anxiety disorder) B02.7    Complicated UTI (urinary tract infection) N39.0    Delirium R41.0    Abdominal pain R10.9    Rigidity R29.898    Colitis K52.9    Other retention of urine R33.8    Acquired renal cyst vomiting dizziness drowsiness abdominal pain loss of consciousness or any other symptoms you find concerning please come to the ED for follow-up evaluation. If you have been given medication please take them as prescribed. Do not take more medication than recommended at any given time. Please follow-up with your primary care provider within 5 to 7 days for continued care. Please feel free return to the hospital if your symptoms worsen or any new concerning symptoms develop. Follow-up with your primary care physician as needed for all other the concerns. Urology instructions:  Remove lance catheter on 9/13/2023 for voiding trial  When void trialing recommend prompting the patient to double void every 4 hours, then check post void residual (bladder scan OR straight cath for residual if bladder scanner not available). For PVR greater than 400cc, straight catheterize. Record voided and post void residual (straight cath or bladder scan). For PVR consistently greater than 450cc, increase frequency of bladder scan and striaght catheterization. Please call urology office for follow up appointment for as soon as possible to re-address retention issues. Physician Certification: I certify the above information and transfer of Jeovanny Guzman  is necessary for the continuing treatment of the diagnosis listed and that she requires Assisted Living for greater 30 days.      Update Admission H&P: No change in H&P    PHYSICIAN SIGNATURE:  Electronically signed by Ede Knox MD on 09/06/2023 at 3:09 PM EDT

## 2023-08-15 NOTE — DISCHARGE INSTRUCTIONS
Urology instructions:  Remove lance catheter on 9/13/2023 for voiding trial  When void trialing recommend prompting the patient to double void every 4 hours, then check post void residual (bladder scan OR straight cath for residual if bladder scanner not available). For PVR greater than 400cc, straight catheterize. Record voided and post void residual (straight cath or bladder scan). For PVR consistently greater than 450cc, increase frequency of bladder scan and striaght catheterization. Please call urology office for follow up appointment for as soon as possible to re-address retention issues. If you begin to experience any symptoms such as chest pain shortness of breath nausea vomiting dizziness drowsiness abdominal pain loss of consciousness or any other symptoms you find concerning please come to the ED for follow-up evaluation. If you have been given medication please take them as prescribed. Do not take more medication than recommended at any given time. Please follow-up with your primary care provider within 5 to 7 days for continued care. Please feel free return to the hospital if your symptoms worsen or any new concerning symptoms develop. Follow-up with your primary care physician as needed for all other the concerns.

## 2023-08-15 NOTE — PROGRESS NOTES
Patient refused 2200 bladder scan, notified physician, will retry soon. Patient also refused night bath.

## 2023-08-15 NOTE — PROGRESS NOTES
Physical Therapy  Facility/Department: [address] [facility]  Physical Therapy    Name: Young Aldrich  : 1940  MRN: [id number]  Date of Service: 8/15/2023    Discharge Recommendations:  Patient would benefit from continued therapy after discharge          Patient Diagnosis(es): The primary encounter diagnosis was Septicemia [facility]). Diagnoses of Altered mental status, unspecified altered mental status type and Acute cystitis without hematuria were also pertinent to this visit. Past Medical History:  has a past medical history of OLIVA (acute kidney injury) ([address]), Anxiety and depression, Depression, GERD (gastroesophageal reflux disease), and Weight loss. Past Surgical History:  has a past surgical history that includes Spine surgery; Tonsillectomy; Colonoscopy (2011); eye surgery; Upper gastrointestinal endoscopy (2014); Colonoscopy (2014); Endoscopy, colon, diagnostic; Coccyx removal (1950); and Breast biopsy (Right, ). Assessment   Body Structures, Functions, Activity Limitations Requiring Skilled Therapeutic Intervention: Decreased functional mobility ; Increased pain;Decreased posture;Decreased strength;Decreased safe awareness;Decreased cognition;Decreased coordination;Decreased balance  Assessment: Patient was able to get out of bed and ambulate to the door and back, poor safety, weak and frail. Patient needs further PT to regain mobility. Requires PT Follow-Up: Yes     Plan   Physcial Therapy Plan  General Plan:  (5-6x/wk)  Current Treatment Recommendations: Home exercise program, Gait training, Functional mobility training, Transfer training, Balance training, Strengthening, ROM, Pain management, Patient/Caregiver education & training, Safety education & training, Therapeutic activities, Positioning  Safety Devices  Type of Devices:  All fall risk precautions in place, Gait belt, Call light within reach, Nurse notified, Bed alarm in place, Left in bed  Restraints  Restraints

## 2023-08-15 NOTE — PROGRESS NOTES
Pt with cont urinary retention, ambulated to  with one assist, states feels urge to void but unable to after attempting x 2. No abd distention noted, pt denies any  pain. Bladder scan with 302 urine, straight cath done with output of 400, clr yellow urine. Pt tolerated without c/o. Resting in bed with eyes closed.

## 2023-08-16 LAB
ANION GAP SERPL CALCULATED.3IONS-SCNC: 12 MMOL/L (ref 9–17)
BASOPHILS # BLD: 0.04 K/UL (ref 0–0.2)
BASOPHILS NFR BLD: 1 % (ref 0–2)
BUN SERPL-MCNC: 8 MG/DL (ref 8–23)
CALCIUM SERPL-MCNC: 9.7 MG/DL (ref 8.6–10.4)
CHLORIDE SERPL-SCNC: 104 MMOL/L (ref 98–107)
CO2 SERPL-SCNC: 23 MMOL/L (ref 20–31)
CREAT SERPL-MCNC: 0.6 MG/DL (ref 0.5–0.9)
EOSINOPHIL # BLD: 0.06 K/UL (ref 0–0.44)
EOSINOPHILS RELATIVE PERCENT: 1 % (ref 1–4)
ERYTHROCYTE [DISTWIDTH] IN BLOOD BY AUTOMATED COUNT: 14.3 % (ref 11.8–14.4)
GFR SERPL CREATININE-BSD FRML MDRD: >60 ML/MIN/1.73M2
GLUCOSE SERPL-MCNC: 106 MG/DL (ref 70–99)
HCT VFR BLD AUTO: 33 % (ref 36.3–47.1)
HGB BLD-MCNC: 10.6 G/DL (ref 11.9–15.1)
IMM GRANULOCYTES # BLD AUTO: <0.03 K/UL (ref 0–0.3)
IMM GRANULOCYTES NFR BLD: 0 %
LYMPHOCYTES NFR BLD: 2.47 K/UL (ref 1.1–3.7)
LYMPHOCYTES RELATIVE PERCENT: 41 % (ref 24–43)
MCH RBC QN AUTO: 31.5 PG (ref 25.2–33.5)
MCHC RBC AUTO-ENTMCNC: 32.1 G/DL (ref 28.4–34.8)
MCV RBC AUTO: 98.2 FL (ref 82.6–102.9)
MONOCYTES NFR BLD: 0.42 K/UL (ref 0.1–1.2)
MONOCYTES NFR BLD: 7 % (ref 3–12)
NEUTROPHILS NFR BLD: 50 % (ref 36–65)
NEUTS SEG NFR BLD: 3 K/UL (ref 1.5–8.1)
NRBC BLD-RTO: 0 PER 100 WBC
PLATELET # BLD AUTO: 284 K/UL (ref 138–453)
PMV BLD AUTO: 9.9 FL (ref 8.1–13.5)
POTASSIUM SERPL-SCNC: 3.9 MMOL/L (ref 3.7–5.3)
RBC # BLD AUTO: 3.36 M/UL (ref 3.95–5.11)
SODIUM SERPL-SCNC: 139 MMOL/L (ref 135–144)
WBC OTHER # BLD: 6 K/UL (ref 3.5–11.3)

## 2023-08-16 PROCEDURE — 99232 SBSQ HOSP IP/OBS MODERATE 35: CPT | Performed by: INTERNAL MEDICINE

## 2023-08-16 PROCEDURE — 51798 US URINE CAPACITY MEASURE: CPT

## 2023-08-16 PROCEDURE — 80048 BASIC METABOLIC PNL TOTAL CA: CPT

## 2023-08-16 PROCEDURE — 2060000000 HC ICU INTERMEDIATE R&B

## 2023-08-16 PROCEDURE — 51701 INSERT BLADDER CATHETER: CPT

## 2023-08-16 PROCEDURE — 6370000000 HC RX 637 (ALT 250 FOR IP)

## 2023-08-16 PROCEDURE — 2580000003 HC RX 258

## 2023-08-16 PROCEDURE — 36415 COLL VENOUS BLD VENIPUNCTURE: CPT

## 2023-08-16 PROCEDURE — 99232 SBSQ HOSP IP/OBS MODERATE 35: CPT | Performed by: HOSPITALIST

## 2023-08-16 PROCEDURE — 6370000000 HC RX 637 (ALT 250 FOR IP): Performed by: INTERNAL MEDICINE

## 2023-08-16 PROCEDURE — APPSS30 APP SPLIT SHARED TIME 16-30 MINUTES: Performed by: NURSE PRACTITIONER

## 2023-08-16 PROCEDURE — 85025 COMPLETE CBC W/AUTO DIFF WBC: CPT

## 2023-08-16 RX ORDER — CLONAZEPAM 0.5 MG/1
0.5 TABLET ORAL 2 TIMES DAILY
Qty: 60 TABLET | Refills: 0 | Status: SHIPPED | OUTPATIENT
Start: 2023-08-16 | End: 2023-09-15

## 2023-08-16 RX ADMIN — PROPRANOLOL HYDROCHLORIDE 20 MG: 20 TABLET ORAL at 19:56

## 2023-08-16 RX ADMIN — QUETIAPINE FUMARATE 25 MG: 25 TABLET ORAL at 19:56

## 2023-08-16 RX ADMIN — SODIUM CHLORIDE, PRESERVATIVE FREE 10 ML: 5 INJECTION INTRAVENOUS at 20:02

## 2023-08-16 RX ADMIN — BUSPIRONE HYDROCHLORIDE 15 MG: 15 TABLET ORAL at 19:56

## 2023-08-16 RX ADMIN — CLONAZEPAM 0.5 MG: 0.5 TABLET ORAL at 19:56

## 2023-08-16 NOTE — PROGRESS NOTES
[facility] - Progress Note    Today's Date and Time: 8/16/2023, 11:16 AM    Impression :   Altered mentation  Recent hx MRSA UTI on 7/27/23-Treated with Macrobid  Urinary retention  Hypokalemia  GERD  Anxiety  Parkinson's    Recommendations:   Monitor off antibiotics   D/C IV Vancomycin- No significant growth on urine culture from 8/10/23  Urology recommendations    Medical Decision Making/Summary/Discussion:8/16/2023       Infection Control Recommendations   [place] Precautions  Contact Precautions for MRSA hx    Antimicrobial Stewardship Recommendations     Discontinuation of therapy  Coordination of Outpatient Care:   Estimated Length of IV antimicrobials:TBD  Patient will need Midline Catheter Insertion: TBD  Patient will need PICC line Insertion:TBD  Patient will need: Home IV , [address] [place],  SNF,  LTAC: TBD  Patient will need outpatient wound care: No    Chief complaint/reason for consultation:   MRSA UTI      History of Present Illness:   Hussein Stovall is a 80y.o.-year-old  female who was initially admitted on 8/10/2023. Patient seen at the request of Dr. Evelio Murcia:    This patient, who sees Dr. Linda Eisenmenger, with Urology, for urinary retention, and was treated for a MRSA UTI with Macrobid at the end of July 2023, presented to the ED from her nursing home with confusion x 24 hrs on 8/10/23. She has a history of anxiety that is treated with Klonopin. Her right pupil was noted to be dilated. A CT of her head was negative for any acute abnormalities. Labwork was mostly unremarkable other than hypokalemia and slightly elevated creatinine. The patient was given fluids and was administered a dose of IV Rocephin and was initiated on IV Vancomycin. Blood cultures have yielded no growth thus far and the urine culture shows no significant growth. Urology was consulted and a CT abd/pelvis was ordered.      ID was consulted for pertinent history and exam findings with the APRN. I have evaluated the  History, physical findings and pictures of the patient and the key elements of the encounter have been performed by me. I have reviewed the laboratory data, other diagnostic studies and discussed them with the APRN. I have updated the medical record where necessary. I agree with the assessment, plan and orders as documented by the APRN. In addition diagnostic and decision making elements include: Altered mentation  Recent hx MRSA UTI on 7/27/23-Treated with Macrobid  Urinary retention  Hypokalemia  GERD  Anxiety  Parkinson's    Elements of Medical Decision Making:  Note: I have independently performed the steps listed below as part of the medical decision making and evaluation. Examined patient. Discussed with patient  Discussed with referring physician or service  Labs, medications, radiologic studies were reviewed with personal review of films  Large amounts of data were reviewed  Discussed with nursing Staff, Discharge planner  Infection Control and Prevention measures reviewed  All prior entries were reviewed  Reviewed Administration of medications as ordered  Established Prognosis: [place]  Discharge planning reviewed  Reviewed need for follow up as outpatient.     Tammy Odom MD.

## 2023-08-16 NOTE — PLAN OF CARE
Problem: Discharge Planning  Goal: Discharge to home or other facility with appropriate resources  Outcome: Progressing  Flowsheets (Taken 8/16/2023 0800)  Discharge to home or other facility with appropriate resources: Identify barriers to discharge with patient and caregiver     Problem: Skin/Tissue Integrity  Goal: Absence of new skin breakdown  Description: 1. Monitor for areas of redness and/or skin breakdown  2. Assess vascular access sites hourly  3. Every 4-6 hours minimum:  Change oxygen saturation probe site  4. Every 4-6 hours:  If on nasal continuous positive airway pressure, respiratory therapy assess nares and determine need for appliance change or resting period. Outcome: Progressing     Problem: Safety - Adult  Goal: Free from fall injury  Outcome: Progressing     Problem: ABCDS Injury Assessment  Goal: Absence of physical injury  Outcome: Progressing     Problem: Confusion  Goal: Confusion, delirium, dementia, or psychosis is improved or at baseline  Description: INTERVENTIONS:  1. Assess for possible contributors to thought disturbance, including medications, impaired vision or hearing, underlying metabolic abnormalities, dehydration, psychiatric diagnoses, and notify attending LIP  2. [place] high risk fall precautions, as indicated  3. Provide frequent short contacts to provide reality reorientation, refocusing and direction  4. Decrease environmental stimuli, including noise as appropriate  5. Monitor and intervene to maintain adequate nutrition, hydration, elimination, sleep and activity  6. If unable to ensure safety without constant attention obtain sitter and review sitter guidelines with assigned personnel  7.  Initiate Psychosocial CNS and Spiritual Care consult, as indicated  Outcome: Progressing  Flowsheets (Taken 8/16/2023 0800)  Effect of thought disturbance (confusion, delirium, dementia, or psychosis) are managed with adequate functional status: Assess for contributors to

## 2023-08-16 NOTE — PROGRESS NOTES
[facility]  Internal Medicine Teaching Residency Program  Inpatient Daily Progress Note  ______________________________________________________________________________    Patient: Audie Harris  YOB: 1940   [id number]    Acct: [de-identified]     Room: [address] [place] [address]-  Admit date: 8/10/2023  Today's date: 08/16/23  Number of days in the hospital: 6    SUBJECTIVE   Admitting Diagnosis: Delirium  CC: Altered Mental status    Pt examined at bedside. Chart & results reviewed. -Patient is oriented to person and  place this morning  -Heart rate is in 90s and 100s and blood pressures are high, her last reading is 144/93    ROS:  Unable to review her systems because she is noncooperative    BRIEF HISTORY     The patient is a 80 y.o. female with past medical history of generalized anxiety disorder, parkinsonism, colitis, acute urinary retention, OLIVA presented  with altered mental status. As per her son the patient is being disoriented for more than 24 hours in the nursing facility. As per the medical record she was admitted in the hospital on 7/2/2023 for the management of generalized anxiety disorder. The patient had repeated episodes of anxiety and tachycardia throughout her hospital stay and she was managed with Ativan and Seroquel as needed, later it was changed to Klonopin by psychiatrist.  During her hospital stay she had a colitis episode for which she was treated with antibiotics and was catheterized with the Croft for acute urinary retention and she was asked to follow-up with urology outpatient for [facility]. On 7/27/2023 patient presented to the urology office for evaluation of urinary retention, at that time as per urology note the patient's urinary catheter was not in place and it fell out. Additionally according to that note the patient had decreased urinary stream, urinary incontinence and dysuria.  A bladder scan was done on the same day PRN  polyethylene glycol, 17 g, Daily PRN  acetaminophen, 650 mg, Q6H PRN   Or  acetaminophen, 650 mg, Q6H PRN        Diagnostic Labs:  CBC:   Recent Labs     08/14/23  0327 08/15/23  0351 08/16/23  0452   WBC 5.3 5.1 6.0   RBC 3.48* 3.31* 3.36*   HGB 10.7* 10.3* 10.6*   HCT 33.8* 33.1* 33.0*   MCV 97.1 100.0 98.2   RDW 14.5* 14.6* 14.3    251 284     BMP:   Recent Labs     08/14/23  0327 08/15/23  0351 08/16/23  0452    139 139   K 3.6* 3.3* 3.9    107 104   CO2 27 25 23   BUN 5* 7* 8   CREATININE 0.7 0.6 0.6     BNP: No results for input(s): BNP in the last 72 hours. PT/INR: No results for input(s): PROTIME, INR in the last 72 hours. APTT: No results for input(s): APTT in the last 72 hours. CARDIAC ENZYMES: No results for input(s): CKMB, CKMBINDEX, TROPONINI in the last 72 hours. Invalid input(s): CKTOTAL;3  FASTING LIPID PANEL:  Lab Results   Component Value Date    CHOL 150 12/22/2022    HDL 57 12/22/2022    TRIG 64 12/22/2022     LIVER PROFILE:   No results for input(s): AST, ALT, ALB, BILIDIR, BILITOT, ALKPHOS in the last 72 hours. MICROBIOLOGY:   Lab Results   Component Value Date/Time    CULTURE NO SIGNIFICANT GROWTH 08/10/2023 12:22 PM       Imaging:    CT ABDOMEN PELVIS WO CONTRAST Additional Contrast? None    Result Date: 8/11/2023  1. No acute process in the abdomen or pelvis. Previously noted findings of colitis at the left side of the colon have improved. 2.  Moderate stool burden, suggesting constipation. 3.  No obstructive uropathy or urolithiasis. 4.  Single focus of gas noted in the urinary bladder, likely related to recent instrumentation. CT HEAD WO CONTRAST    Result Date: 8/10/2023  No acute intracranial abnormality. Similar senescent findings, as above. XR CHEST PORTABLE    Result Date: 8/10/2023  No acute intrathoracic process.        ASSESSMENT & PLAN     ASSESSMENT / PLAN:       IMPRESSION  This is a 80 y.o. female who presented with altered mental status

## 2023-08-16 NOTE — CARE COORDINATION
Transitional planning note: plan is back to [facility]. Spoke with Aaron Friend in admissions at [facility] and they are just waiting on precert. CM advised that patient does have discharge order in place and is ready as soon as prior [place] is complete.

## 2023-08-16 NOTE — PROGRESS NOTES
Comprehensive Nutrition Assessment    Type and Reason for Visit:  Reassess    Nutrition Recommendations/Plan:   Continue current diet  Recommend resume high calorie/high protein ONS  Monitor weight, labs and intake. Malnutrition Assessment:  Malnutrition Status:  Severe malnutrition (08/12/23 1447)    Context:  Chronic Illness     Findings of the 6 clinical characteristics of malnutrition:  Energy Intake:  75% or less estimated energy requirements for 1 month or longer  Weight Loss:  Greater than 20% over 1 year     Body Fat Loss:  Mild body fat loss Orbital   Muscle Mass Loss:  Mild muscle mass loss Hand (interosseous)  Fluid Accumulation:  No significant fluid accumulation     Strength:  Not Performed    Nutrition Assessment:    Patient seen for follow up. She appears to remain confused, states she is looking for her son and does not have time to talk about food. Declines to talk to dietitian at this time. Noted untouched breakfast tray at bedside. Recommend resume Ensure BID r/t poor intake. Labs/meds reviewed. Nutrition Related Findings:    labs/meds reviewed Wound Type: None       Current Nutrition Intake & Therapies:    Average Meal Intake: 0%, 1-25%  Average Supplements Intake: None Ordered  ADULT DIET; Regular  ADULT ORAL NUTRITION SUPPLEMENT; Lunch, Dinner; Standard High Calorie/High Protein Oral Supplement    Anthropometric Measures:  Height: 5' 4\" (162.6 cm)  Ideal Body Weight (IBW): 120 lbs (55 kg)    Admission Body Weight: 114 lb 13.8 oz (52.1 kg) (8/12)  Current Body Weight: 114 lb 13.8 oz (52.1 kg) (8/12), 95.7 % IBW.     Current BMI (kg/m2): 19.7  Usual Body Weight: 146 lb 6.4 oz (66.4 kg) (12/13/22)  % Weight Change (Calculated): -21.5  Weight Adjustment For: No Adjustment                 BMI Categories: Underweight (BMI less than 22) age over 72    Estimated Daily Nutrient Needs:  Energy Requirements Based On: Kcal/kg  Weight Used for Energy Requirements: Admission  Energy (kcal/day):

## 2023-08-17 LAB
ANION GAP SERPL CALCULATED.3IONS-SCNC: 14 MMOL/L (ref 9–17)
BASOPHILS # BLD: 0.05 K/UL (ref 0–0.2)
BASOPHILS NFR BLD: 1 % (ref 0–2)
BUN SERPL-MCNC: 11 MG/DL (ref 8–23)
CALCIUM SERPL-MCNC: 9.7 MG/DL (ref 8.6–10.4)
CHLORIDE SERPL-SCNC: 102 MMOL/L (ref 98–107)
CO2 SERPL-SCNC: 22 MMOL/L (ref 20–31)
CREAT SERPL-MCNC: 0.6 MG/DL (ref 0.5–0.9)
EOSINOPHIL # BLD: 0.06 K/UL (ref 0–0.44)
EOSINOPHILS RELATIVE PERCENT: 1 % (ref 1–4)
ERYTHROCYTE [DISTWIDTH] IN BLOOD BY AUTOMATED COUNT: 14 % (ref 11.8–14.4)
GFR SERPL CREATININE-BSD FRML MDRD: >60 ML/MIN/1.73M2
GLUCOSE SERPL-MCNC: 78 MG/DL (ref 70–99)
HCT VFR BLD AUTO: 34 % (ref 36.3–47.1)
HGB BLD-MCNC: 11 G/DL (ref 11.9–15.1)
IMM GRANULOCYTES # BLD AUTO: <0.03 K/UL (ref 0–0.3)
IMM GRANULOCYTES NFR BLD: 0 %
LYMPHOCYTES NFR BLD: 2.86 K/UL (ref 1.1–3.7)
LYMPHOCYTES RELATIVE PERCENT: 39 % (ref 24–43)
MCH RBC QN AUTO: 31.9 PG (ref 25.2–33.5)
MCHC RBC AUTO-ENTMCNC: 32.4 G/DL (ref 28.4–34.8)
MCV RBC AUTO: 98.6 FL (ref 82.6–102.9)
MONOCYTES NFR BLD: 0.55 K/UL (ref 0.1–1.2)
MONOCYTES NFR BLD: 8 % (ref 3–12)
NEUTROPHILS NFR BLD: 51 % (ref 36–65)
NEUTS SEG NFR BLD: 3.8 K/UL (ref 1.5–8.1)
NRBC BLD-RTO: 0 PER 100 WBC
PLATELET # BLD AUTO: 291 K/UL (ref 138–453)
PMV BLD AUTO: 10.2 FL (ref 8.1–13.5)
POTASSIUM SERPL-SCNC: 3.9 MMOL/L (ref 3.7–5.3)
RBC # BLD AUTO: 3.45 M/UL (ref 3.95–5.11)
SODIUM SERPL-SCNC: 138 MMOL/L (ref 135–144)
WBC OTHER # BLD: 7.3 K/UL (ref 3.5–11.3)

## 2023-08-17 PROCEDURE — 6370000000 HC RX 637 (ALT 250 FOR IP)

## 2023-08-17 PROCEDURE — 99232 SBSQ HOSP IP/OBS MODERATE 35: CPT | Performed by: INTERNAL MEDICINE

## 2023-08-17 PROCEDURE — 6360000002 HC RX W HCPCS

## 2023-08-17 PROCEDURE — 99232 SBSQ HOSP IP/OBS MODERATE 35: CPT | Performed by: HOSPITALIST

## 2023-08-17 PROCEDURE — 6370000000 HC RX 637 (ALT 250 FOR IP): Performed by: INTERNAL MEDICINE

## 2023-08-17 PROCEDURE — 51701 INSERT BLADDER CATHETER: CPT

## 2023-08-17 PROCEDURE — 80048 BASIC METABOLIC PNL TOTAL CA: CPT

## 2023-08-17 PROCEDURE — 36415 COLL VENOUS BLD VENIPUNCTURE: CPT

## 2023-08-17 PROCEDURE — 2580000003 HC RX 258

## 2023-08-17 PROCEDURE — 85025 COMPLETE CBC W/AUTO DIFF WBC: CPT

## 2023-08-17 PROCEDURE — APPSS30 APP SPLIT SHARED TIME 16-30 MINUTES: Performed by: NURSE PRACTITIONER

## 2023-08-17 PROCEDURE — 51798 US URINE CAPACITY MEASURE: CPT

## 2023-08-17 PROCEDURE — 2060000000 HC ICU INTERMEDIATE R&B

## 2023-08-17 RX ORDER — DULOXETIN HYDROCHLORIDE 60 MG/1
60 CAPSULE, DELAYED RELEASE ORAL 2 TIMES DAILY
Qty: 30 CAPSULE | Refills: 3 | DISCHARGE
Start: 2023-08-17

## 2023-08-17 RX ADMIN — QUETIAPINE FUMARATE 25 MG: 25 TABLET ORAL at 20:18

## 2023-08-17 RX ADMIN — ATORVASTATIN CALCIUM 20 MG: 20 TABLET, FILM COATED ORAL at 08:28

## 2023-08-17 RX ADMIN — CLONAZEPAM 0.5 MG: 0.5 TABLET ORAL at 08:33

## 2023-08-17 RX ADMIN — DULOXETINE HYDROCHLORIDE 60 MG: 30 CAPSULE, DELAYED RELEASE ORAL at 20:18

## 2023-08-17 RX ADMIN — BUSPIRONE HYDROCHLORIDE 15 MG: 15 TABLET ORAL at 20:18

## 2023-08-17 RX ADMIN — PROPRANOLOL HYDROCHLORIDE 20 MG: 20 TABLET ORAL at 20:18

## 2023-08-17 RX ADMIN — CLONAZEPAM 0.5 MG: 0.5 TABLET ORAL at 20:18

## 2023-08-17 RX ADMIN — BUSPIRONE HYDROCHLORIDE 15 MG: 15 TABLET ORAL at 08:28

## 2023-08-17 RX ADMIN — STANDARDIZED SENNA CONCENTRATE AND DOCUSATE SODIUM 2 TABLET: 8.6; 5 TABLET ORAL at 08:28

## 2023-08-17 RX ADMIN — DULOXETINE HYDROCHLORIDE 60 MG: 30 CAPSULE, DELAYED RELEASE ORAL at 08:28

## 2023-08-17 RX ADMIN — SODIUM CHLORIDE, PRESERVATIVE FREE 10 ML: 5 INJECTION INTRAVENOUS at 20:19

## 2023-08-17 RX ADMIN — SODIUM CHLORIDE, PRESERVATIVE FREE 10 ML: 5 INJECTION INTRAVENOUS at 08:31

## 2023-08-17 RX ADMIN — ENOXAPARIN SODIUM 40 MG: 100 INJECTION SUBCUTANEOUS at 08:28

## 2023-08-17 RX ADMIN — PROPRANOLOL HYDROCHLORIDE 20 MG: 20 TABLET ORAL at 08:28

## 2023-08-17 ASSESSMENT — PAIN SCALES - GENERAL
PAINLEVEL_OUTOF10: 0

## 2023-08-17 NOTE — PROGRESS NOTES
Patient awake upon assessment, requesting assistance to the bathroom. Patient x1 assist with walker to the bathroom, x1 BM with no urine. Then agreed to ambulate in the cordero. Successfully ambulated 125-150 feet In the cordero and returned to bed with minimal assistance.

## 2023-08-17 NOTE — PROGRESS NOTES
Bladder scan revealed 375ml, writing nurse ambulated patient to the bathroom and patient voided 250ml herself.

## 2023-08-17 NOTE — PROGRESS NOTES
Physical Therapy        Physical Therapy Cancel Note      DATE: 2023    NAME: Aurea Paz  MRN: [id number]   : 1940      Patient not seen this date for Physical Therapy due to: Other: Pt reported being unable to partake in PT d/t fatigue, needing to eat. Will ck back PM or  as able.        Electronically signed by Nancy Díaz PT on 2023 at 11:19 AM

## 2023-08-17 NOTE — CARE COORDINATION
Transitional planning note: plan is [facility]. Spoke with eva and Melanee Bump is pending. She is calling insurance company this morning to check on status again. Advised that patient is ready for discharge when Melanee Bump comes through.

## 2023-08-17 NOTE — PROGRESS NOTES
[facility] - Progress Note    Today's Date and Time: 8/17/2023, 9:32 AM    Impression :   Altered mentation  Recent hx MRSA UTI on 7/27/23-Treated with Macrobid  Urinary retention  Hypokalemia  GERD  Anxiety  Parkinson's    Recommendations:   Monitor off antibiotics   D/C IV Vancomycin- No significant growth on urine culture from 8/10/23  Urology recommendations    Medical Decision Making/Summary/Discussion:8/17/2023       Infection Control Recommendations   [place] Precautions  Contact Precautions for MRSA hx    Antimicrobial Stewardship Recommendations     Discontinuation of therapy  Coordination of Outpatient Care:   Estimated Length of IV antimicrobials:NA  Patient will need Midline Catheter Insertion: No  Patient will need PICC line Insertion:No  Patient will need: Home IV , [address] [place],  SNF,  LTAC: No  Patient will need outpatient wound care: No    Chief complaint/reason for consultation:   MRSA UTI      History of Present Illness:   Irina Gonzáles is a 80y.o.-year-old  female who was initially admitted on 8/10/2023. Patient seen at the request of Dr. Joycelyn Peña:    This patient, who sees Dr. Esme Bustillos, with Urology, for urinary retention, and was treated for a MRSA UTI with Macrobid at the end of July 2023, presented to the ED from her nursing home with confusion x 24 hrs on 8/10/23. She has a history of anxiety that is treated with Klonopin. Her right pupil was noted to be dilated. A CT of her head was negative for any acute abnormalities. Labwork was mostly unremarkable other than hypokalemia and slightly elevated creatinine. The patient was given fluids and was administered a dose of IV Rocephin and was initiated on IV Vancomycin. Blood cultures have yielded no growth thus far and the urine culture shows no significant growth. Urology was consulted and a CT abd/pelvis was ordered.      ID was consulted for patient Status: Completed Specimen: Blood Updated: 08/15/23 1146     Specimen Description . BLOOD     Special Requests RAC 10ML     Culture NO GROWTH 5 DAYS    Culture, Blood 2 [[id number]] Collected: 08/10/23 1109    Order Status: Completed Specimen: Blood Updated: 08/15/23 1146     Specimen Description . BLOOD     Special Requests LAC 10ML     Culture NO GROWTH 5 DAYS          Contains abnormal data Culture, Urine  Order: [id number]  Status: Final result     Visible to patient: Yes (not seen)     Next appt: 09/07/2023 at 09:15 AM in Family Medicine ([address], [place])     Dx: Dysuria     Specimen Information: Urine, straight catheter   2 Result Notes      Component    Specimen Description . URINE,STRAIGHT CATHETER    Culture METHICILLIN RESISTANT STAPHYLOCOCCUS AUREUS >100,000 CFU/ML Abnormal     Resulting [address]        Susceptibility    Methicillin-Resistant Staphylococcus aureus (1)    Antibiotic Interpretation Microscan Method Status    penicillin Resistant >=0.5 BACTERIAL SUSCEPTIBILITY PANEL SIDDHARTH Final    gentamicin Sensitive <=0.5 BACTERIAL SUSCEPTIBILITY PANEL SIDDHARTH Final     Gentamicin is used only in combination with other active agents that test susceptible. levofloxacin Intermediate 4 BACTERIAL SUSCEPTIBILITY PANEL SIDDHARTH Final    nitrofurantoin Sensitive <=16 BACTERIAL SUSCEPTIBILITY PANEL SIDDHARTH Final    oxacillin Resistant >=4 BACTERIAL SUSCEPTIBILITY PANEL SIDDHARTH Final    tetracycline Sensitive <=1 BACTERIAL SUSCEPTIBILITY PANEL SIDDHARTH Final    trimethoprim-sulfamethoxazole Sensitive <=10 BACTERIAL SUSCEPTIBILITY PANEL SIDDHARTH Final    vancomycin Sensitive 1 BACTERIAL SUSCEPTIBILITY PANEL SIDDHARTH Final                 Medical Decision Making-Other:     Note:    Thank you for allowing us to participate in the care of this patient. Please call with questions.     AILYN Rojas - CNP        ATTESTATION:    I have discussed the case, including pertinent history and exam findings with the

## 2023-08-17 NOTE — PROGRESS NOTES
[facility]  Internal Medicine Teaching Residency Program  Inpatient Daily Progress Note  ______________________________________________________________________________    Patient: Briana Kemp  YOB: 1940   [id number]    Acct: [de-identified]     Room: [address] [place] [address]-  Admit date: 8/10/2023  Today's date: 08/17/23  Number of days in the hospital: 7    SUBJECTIVE   Admitting Diagnosis: Delirium  CC: Altered Mental status    Pt examined at bedside. Chart & results reviewed. -Patient is oriented to person and  place this morning  - No acute issues as per nursing and patient    Denies any SOB, chest pain, abdominal pain, nausea/vomiting at this point of time. BRIEF HISTORY     The patient is a 80 y.o. female with past medical history of generalized anxiety disorder, parkinsonism, colitis, acute urinary retention, OLIVA presented  with altered mental status. As per her son the patient is being disoriented for more than 24 hours in the nursing facility. As per the medical record she was admitted in the hospital on 7/2/2023 for the management of generalized anxiety disorder. The patient had repeated episodes of anxiety and tachycardia throughout her hospital stay and she was managed with Ativan and Seroquel as needed, later it was changed to Klonopin by psychiatrist.  During her hospital stay she had a colitis episode for which she was treated with antibiotics and was catheterized with the Croft for acute urinary retention and she was asked to follow-up with urology outpatient for [facility]. On 7/27/2023 patient presented to the urology office for evaluation of urinary retention, at that time as per urology note the patient's urinary catheter was not in place and it fell out. Additionally according to that note the patient had decreased urinary stream, urinary incontinence and dysuria.  A bladder scan was done on the same day revealed PVR of 74 mL and

## 2023-08-17 NOTE — PLAN OF CARE
Problem: Discharge Planning  Goal: Discharge to home or other facility with appropriate resources  8/16/2023 2040 by Jeff Francisco RN  Outcome: Progressing  Flowsheets (Taken 8/16/2023 1930)  Discharge to home or other facility with appropriate resources:   Identify barriers to discharge with patient and caregiver   Identify discharge learning needs (meds, wound care, etc)   Arrange for needed discharge resources and transportation as appropriate     Problem: Skin/Tissue Integrity  Goal: Absence of new skin breakdown  Description: 1. Monitor for areas of redness and/or skin breakdown  2. Assess vascular access sites hourly  3. Every 4-6 hours minimum:  Change oxygen saturation probe site  4. Every 4-6 hours:  If on nasal continuous positive airway pressure, respiratory therapy assess nares and determine need for appliance change or resting period. 8/16/2023 2040 by Jeff Francisco RN  Outcome: Progressing     Problem: Safety - Adult  Goal: Free from fall injury  8/16/2023 2040 by Jeff Francisco RN  Outcome: Progressing  Flowsheets (Taken 8/16/2023 2037)  Free From Fall Injury: Instruct family/caregiver on patient safety     Problem: ABCDS Injury Assessment  Goal: Absence of physical injury  8/16/2023 2040 by Jeff Francisco RN  Outcome: Progressing  Flowsheets (Taken 8/16/2023 2037)  Absence of Physical Injury: Implement safety measures based on patient assessment     Problem: Confusion  Goal: Confusion, delirium, dementia, or psychosis is improved or at baseline  Description: INTERVENTIONS:  1. Assess for possible contributors to thought disturbance, including medications, impaired vision or hearing, underlying metabolic abnormalities, dehydration, psychiatric diagnoses, and notify attending LIP  2. [place] high risk fall precautions, as indicated  3. Provide frequent short contacts to provide reality reorientation, refocusing and direction  4.  Decrease environmental stimuli, including noise as appropriate  5. Monitor and intervene to maintain adequate nutrition, hydration, elimination, sleep and activity  6. If unable to ensure safety without constant attention obtain sitter and review sitter guidelines with assigned personnel  7.  Initiate Psychosocial CNS and Spiritual Care consult, as indicated  8/16/2023 2040 by John Culver RN  Outcome: Progressing  Flowsheets (Taken 8/16/2023 1930)  Effect of thought disturbance (confusion, delirium, dementia, or psychosis) are managed with adequate functional status:   Assess for contributors to thought disturbance, including medications, impaired vision or hearing, underlying metabolic abnormalities, dehydration, psychiatric diagnoses, notify [address] high risk fall precautions, as indicated     Problem: Nutrition Deficit:  Goal: Optimize nutritional status  8/16/2023 2040 by John Culver RN  Outcome: Progressing  Flowsheets (Taken 8/16/2023 0923 by Tushar Quijano RD)  Nutrient intake appropriate for improving, restoring, or maintaining nutritional needs:   Assess nutritional status and recommend course of action   Monitor oral intake, labs, and treatment plans   Recommend appropriate diets, oral nutritional supplements, and vitamin/mineral supplements

## 2023-08-17 NOTE — PLAN OF CARE
Problem: Discharge Planning  Goal: Discharge to home or other facility with appropriate resources  8/17/2023 0810 by Robert Cortés RN  Outcome: Progressing  8/16/2023 2040 by Catharine Gottron, RN  Outcome: Progressing  Flowsheets (Taken 8/16/2023 1930)  Discharge to home or other facility with appropriate resources:   Identify barriers to discharge with patient and caregiver   Identify discharge learning needs (meds, wound care, etc)   Arrange for needed discharge resources and transportation as appropriate     Problem: Skin/Tissue Integrity  Goal: Absence of new skin breakdown  Description: 1. Monitor for areas of redness and/or skin breakdown  2. Assess vascular access sites hourly  3. Every 4-6 hours minimum:  Change oxygen saturation probe site  4. Every 4-6 hours:  If on nasal continuous positive airway pressure, respiratory therapy assess nares and determine need for appliance change or resting period. 8/17/2023 0810 by Robert Cortés RN  Outcome: Progressing  8/16/2023 2040 by Catharine Gottron, RN  Outcome: Progressing     Problem: Safety - Adult  Goal: Free from fall injury  8/17/2023 0810 by Robert Cortés RN  Outcome: Progressing  8/16/2023 2040 by Catharine Gottron, RN  Outcome: Progressing  [address] (Taken 8/16/2023 2037)  Free From Fall Injury: Instruct family/caregiver on patient safety     Problem: ABCDS Injury Assessment  Goal: Absence of physical injury  8/17/2023 0810 by Robert Cortés RN  Outcome: Progressing  8/16/2023 2040 by Catharine Gottron, RN  Outcome: Progressing  Flowsheets (Taken 8/16/2023 2037)  Absence of Physical Injury: Implement safety measures based on patient assessment     Problem: Confusion  Goal: Confusion, delirium, dementia, or psychosis is improved or at baseline  Description: INTERVENTIONS:  1.  Assess for possible contributors to thought disturbance, including medications, impaired vision or hearing, underlying metabolic abnormalities, dehydration, psychiatric diagnoses,

## 2023-08-18 PROCEDURE — APPSS30 APP SPLIT SHARED TIME 16-30 MINUTES: Performed by: NURSE PRACTITIONER

## 2023-08-18 PROCEDURE — 51702 INSERT TEMP BLADDER CATH: CPT

## 2023-08-18 PROCEDURE — 6360000002 HC RX W HCPCS

## 2023-08-18 PROCEDURE — 6370000000 HC RX 637 (ALT 250 FOR IP)

## 2023-08-18 PROCEDURE — 2580000003 HC RX 258

## 2023-08-18 PROCEDURE — 51798 US URINE CAPACITY MEASURE: CPT

## 2023-08-18 PROCEDURE — 97530 THERAPEUTIC ACTIVITIES: CPT

## 2023-08-18 PROCEDURE — 2060000000 HC ICU INTERMEDIATE R&B

## 2023-08-18 PROCEDURE — 99232 SBSQ HOSP IP/OBS MODERATE 35: CPT | Performed by: HOSPITALIST

## 2023-08-18 PROCEDURE — 6370000000 HC RX 637 (ALT 250 FOR IP): Performed by: INTERNAL MEDICINE

## 2023-08-18 PROCEDURE — 97535 SELF CARE MNGMENT TRAINING: CPT

## 2023-08-18 PROCEDURE — 99232 SBSQ HOSP IP/OBS MODERATE 35: CPT | Performed by: INTERNAL MEDICINE

## 2023-08-18 RX ORDER — CLONAZEPAM 0.5 MG/1
0.25 TABLET ORAL 2 TIMES DAILY
Status: DISCONTINUED | OUTPATIENT
Start: 2023-08-18 | End: 2023-09-08 | Stop reason: HOSPADM

## 2023-08-18 RX ADMIN — BUSPIRONE HYDROCHLORIDE 15 MG: 15 TABLET ORAL at 09:52

## 2023-08-18 RX ADMIN — ENOXAPARIN SODIUM 40 MG: 100 INJECTION SUBCUTANEOUS at 09:53

## 2023-08-18 RX ADMIN — CLONAZEPAM 0.5 MG: 0.5 TABLET ORAL at 11:04

## 2023-08-18 RX ADMIN — ATORVASTATIN CALCIUM 20 MG: 20 TABLET, FILM COATED ORAL at 09:51

## 2023-08-18 RX ADMIN — CLONAZEPAM 0.25 MG: 0.5 TABLET ORAL at 20:34

## 2023-08-18 RX ADMIN — DULOXETINE HYDROCHLORIDE 60 MG: 30 CAPSULE, DELAYED RELEASE ORAL at 20:34

## 2023-08-18 RX ADMIN — DULOXETINE HYDROCHLORIDE 60 MG: 30 CAPSULE, DELAYED RELEASE ORAL at 09:52

## 2023-08-18 RX ADMIN — PROPRANOLOL HYDROCHLORIDE 20 MG: 20 TABLET ORAL at 09:54

## 2023-08-18 RX ADMIN — SODIUM CHLORIDE, PRESERVATIVE FREE 10 ML: 5 INJECTION INTRAVENOUS at 09:58

## 2023-08-18 RX ADMIN — BUSPIRONE HYDROCHLORIDE 15 MG: 15 TABLET ORAL at 14:23

## 2023-08-18 RX ADMIN — QUETIAPINE FUMARATE 25 MG: 25 TABLET ORAL at 20:34

## 2023-08-18 RX ADMIN — BUSPIRONE HYDROCHLORIDE 15 MG: 15 TABLET ORAL at 20:34

## 2023-08-18 RX ADMIN — PROPRANOLOL HYDROCHLORIDE 20 MG: 20 TABLET ORAL at 20:34

## 2023-08-18 RX ADMIN — SODIUM CHLORIDE, PRESERVATIVE FREE 10 ML: 5 INJECTION INTRAVENOUS at 20:34

## 2023-08-18 RX ADMIN — STANDARDIZED SENNA CONCENTRATE AND DOCUSATE SODIUM 2 TABLET: 8.6; 5 TABLET ORAL at 09:58

## 2023-08-18 ASSESSMENT — PAIN SCALES - GENERAL
PAINLEVEL_OUTOF10: 0
PAINLEVEL_OUTOF10: 0

## 2023-08-18 NOTE — PROGRESS NOTES
Occupational Therapy  [facility] [address] [facility]  Occupational Therapy Daily Treatment Note      Name: Rebecca Aldrich  : 1940  MRN: [id number]  Date of Service: 2023    Discharge Recommendations:  Patient would benefit from continued therapy after discharge     Patient Diagnosis(es): The primary encounter diagnosis was Septicemia [facility]). Diagnoses of Altered mental status, unspecified altered mental status type, Acute cystitis without hematuria, and OUSMANE (generalized anxiety disorder) were also pertinent to this visit. Past Medical History:  has a past medical history of OLIVA (acute kidney injury) ([address]), Anxiety and depression, Depression, GERD (gastroesophageal reflux disease), and Weight loss. Past Surgical History:  has a past surgical history that includes Spine surgery; Tonsillectomy; Colonoscopy (2011); eye surgery; Upper gastrointestinal endoscopy (2014); Colonoscopy (2014); Endoscopy, colon, diagnostic; Coccyx removal (1950); and Breast biopsy (Right, ). Treatment Diagnosis: Delirium      Assessment   Performance deficits / Impairments: Decreased functional mobility ; Decreased safe awareness;Decreased balance;Decreased ADL status; Decreased endurance;Decreased high-level IADLs;Decreased cognition  Assessment: Pt continues to require assist for all ADL tasks and transfers this session. Pt limited with all direction following this session and required increased time for all tasks.   Treatment Diagnosis: Delirium  Prognosis: Fair  Activity Tolerance  Activity Tolerance: Treatment limited secondary to decreased cognition        Plan   Occupational Therapy Plan  Times Per Week: 3 x week     Restrictions  Restrictions/Precautions  Restrictions/Precautions: Up as Tolerated, Aspiration Risk, Bed Alarm, Contact Precautions  Required Braces or Orthoses?: No  Position Activity Restriction  Other position/activity restrictions: Recent tachycardia- no tachycardia today

## 2023-08-18 NOTE — PROGRESS NOTES
[facility]  Internal Medicine Teaching Residency Program  Inpatient Daily Progress Note  ______________________________________________________________________________    Patient: Lo Carson  YOB: 1940   [id number]    Acct: [de-identified]     Room: [address] [address]  Admit date: 8/10/2023  Today's date: 08/18/23  Number of days in the hospital: 8    SUBJECTIVE   Admitting Diagnosis: Delirium  CC: Altered Mental status    Pt examined at bedside. Chart & results reviewed. -Patient is oriented to person and not anxious this morning  -Heart rate is in 90s and 100s and blood pressures are high, her last reading is 142/89    ROS:  Unable to review her systems because she is noncooperative    BRIEF HISTORY     The patient is a 80 y.o. female with past medical history of generalized anxiety disorder, parkinsonism, colitis, acute urinary retention, OLIVA presented  with altered mental status. As per her son the patient is being disoriented for more than 24 hours in the nursing facility. As per the medical record she was admitted in the hospital on 7/2/2023 for the management of generalized anxiety disorder. The patient had repeated episodes of anxiety and tachycardia throughout her hospital stay and she was managed with Ativan and Seroquel as needed, later it was changed to Klonopin by psychiatrist.  During her hospital stay she had a colitis episode for which she was treated with antibiotics and was catheterized with the Croft for acute urinary retention and she was asked to follow-up with urology outpatient for [facility]. On 7/27/2023 patient presented to the urology office for evaluation of urinary retention, at that time as per urology note the patient's urinary catheter was not in place and it fell out. Additionally according to that note the patient had decreased urinary stream, urinary incontinence and dysuria.  A bladder scan was done on the same day

## 2023-08-18 NOTE — PROGRESS NOTES
Comprehensive Nutrition Assessment    Type and Reason for Visit:  Reassess    Nutrition Recommendations/Plan:   Continue diet and ONS   Recommend appetite stimulant   Monitor weight, labs and intake. Malnutrition Assessment:  Malnutrition Status:  Severe malnutrition (08/12/23 1447)    Context:  Chronic Illness     Findings of the 6 clinical characteristics of malnutrition:  Energy Intake:  75% or less estimated energy requirements for 1 month or longer  Weight Loss:  Greater than 20% over 1 year     Body Fat Loss:  Mild body fat loss Orbital   Muscle Mass Loss:  Mild muscle mass loss Hand (interosseous)  Fluid Accumulation:  No significant fluid accumulation     Strength:  Not Performed    Nutrition Assessment:    Patient seen for follow up. Per EHR, consuming 51-75%. Patient observed 0% of lunch consumed, patient states she just wants to sleep. 1 open Ensure at bedside, 0-25% of ONS consumed. Recommend appetite stimulant. Patient may also benefit from nutrition support as intake has not improved. Labs/meds reviewed    Nutrition Related Findings:    labs/meds reviewed Wound Type: None       Current Nutrition Intake & Therapies:    Average Meal Intake: 0%, 1-25%, 26-50% (variable intake)  Average Supplements Intake: None Ordered  ADULT DIET; Regular  ADULT ORAL NUTRITION SUPPLEMENT; Lunch, Dinner; Standard High Calorie/High Protein Oral Supplement    Anthropometric Measures:  Height: 5' 4\" (162.6 cm)  Ideal Body Weight (IBW): 120 lbs (55 kg)    Admission Body Weight: 114 lb 13.8 oz (52.1 kg) (8/12)  Current Body Weight: 114 lb 13.8 oz (52.1 kg) (8/12), 95.7 % IBW.     Current BMI (kg/m2): 19.7  Usual Body Weight: 146 lb 6.4 oz (66.4 kg) (12/13/22)  % Weight Change (Calculated): -21.5  Weight Adjustment For: No Adjustment                 BMI Categories: Underweight (BMI less than 22) age over 72    Estimated Daily Nutrient Needs:  Energy Requirements Based On: Kcal/kg  Weight Used for Energy Requirements: Admission  Energy (kcal/day): 1500 to 1600 kcal.day  Weight Used for Protein Requirements: Admission  Protein (g/day): 60 to 70 g/day     Fluid (ml/day): per MD    Nutrition Diagnosis:   Severe malnutrition related to  (anxiety, poor appetite) as evidenced by Criteria as identified in malnutrition assessment    Nutrition Interventions:   Food and/or Nutrient Delivery: Continue Current Diet, Continue Oral Nutrition Supplement  Nutrition Education/Counseling: No recommendation at this time  Coordination of Nutrition Care: Continue to monitor while inpatient       Goals:  Previous Goal Met: No Progress toward Goal(s)  Goals: Meet at least 75% of estimated needs, prior to discharge       Nutrition Monitoring and Evaluation:   Behavioral-Environmental Outcomes: None Identified  Food/Nutrient Intake Outcomes: Supplement Intake, Food and Nutrient Intake  Physical Signs/Symptoms Outcomes: Biochemical Data, Nutrition Focused Physical Findings, Weight    Discharge Planning:    Continue Oral Nutrition Supplement     Mónica Ji, [address]: [id number]

## 2023-08-18 NOTE — CARE COORDINATION
Transitional planning note: plan is to return to [address] [place] [address]. Spoke with Yamileth Puente in admissions who states [facility] is still pending but their eligibility team is calling to escalate. CM advised patient is ready for discharge as soon as [facility] is complete.

## 2023-08-18 NOTE — PLAN OF CARE
Problem: Discharge Planning  Goal: Discharge to home or other facility with appropriate resources  8/18/2023 1137 by Cristine Valentin RN  Outcome: Progressing  8/17/2023 2339 by Pedro Fuentes RN  Outcome: Progressing     Problem: Skin/Tissue Integrity  Goal: Absence of new skin breakdown  Description: 1. Monitor for areas of redness and/or skin breakdown  2. Assess vascular access sites hourly  3. Every 4-6 hours minimum:  Change oxygen saturation probe site  4. Every 4-6 hours:  If on nasal continuous positive airway pressure, respiratory therapy assess nares and determine need for appliance change or resting period. 8/18/2023 1137 by Cristine Valentin RN  Outcome: Progressing  8/17/2023 2339 by Pedro Fuentes RN  Outcome: Progressing     Problem: Safety - Adult  Goal: Free from fall injury  8/18/2023 1137 by Cristine Valentin RN  Outcome: Progressing  8/17/2023 2339 by Pedro Fuentes RN  Outcome: Progressing     Problem: ABCDS Injury Assessment  Goal: Absence of physical injury  8/18/2023 1137 by Cristine Valentin RN  Outcome: Progressing  8/17/2023 2339 by Pedro Fuentes RN  Outcome: Progressing     Problem: Confusion  Goal: Confusion, delirium, dementia, or psychosis is improved or at baseline  Description: INTERVENTIONS:  1. Assess for possible contributors to thought disturbance, including medications, impaired vision or hearing, underlying metabolic abnormalities, dehydration, psychiatric diagnoses, and notify attending LIP  2. [place] high risk fall precautions, as indicated  3. Provide frequent short contacts to provide reality reorientation, refocusing and direction  4. Decrease environmental stimuli, including noise as appropriate  5. Monitor and intervene to maintain adequate nutrition, hydration, elimination, sleep and activity  6. If unable to ensure safety without constant attention obtain sitter and review sitter guidelines with assigned personnel  7.  Initiate Psychosocial CNS and Spiritual Care

## 2023-08-19 PROCEDURE — 6370000000 HC RX 637 (ALT 250 FOR IP): Performed by: INTERNAL MEDICINE

## 2023-08-19 PROCEDURE — 2060000000 HC ICU INTERMEDIATE R&B

## 2023-08-19 PROCEDURE — 6360000002 HC RX W HCPCS

## 2023-08-19 PROCEDURE — 6370000000 HC RX 637 (ALT 250 FOR IP)

## 2023-08-19 PROCEDURE — 99232 SBSQ HOSP IP/OBS MODERATE 35: CPT | Performed by: HOSPITALIST

## 2023-08-19 PROCEDURE — 2580000003 HC RX 258

## 2023-08-19 PROCEDURE — 99232 SBSQ HOSP IP/OBS MODERATE 35: CPT | Performed by: INTERNAL MEDICINE

## 2023-08-19 RX ORDER — 0.9 % SODIUM CHLORIDE 0.9 %
500 INTRAVENOUS SOLUTION INTRAVENOUS ONCE
Status: COMPLETED | OUTPATIENT
Start: 2023-08-19 | End: 2023-08-19

## 2023-08-19 RX ADMIN — SODIUM CHLORIDE 500 ML: 9 INJECTION, SOLUTION INTRAVENOUS at 18:01

## 2023-08-19 RX ADMIN — QUETIAPINE FUMARATE 25 MG: 25 TABLET ORAL at 19:43

## 2023-08-19 RX ADMIN — ATORVASTATIN CALCIUM 20 MG: 20 TABLET, FILM COATED ORAL at 08:14

## 2023-08-19 RX ADMIN — STANDARDIZED SENNA CONCENTRATE AND DOCUSATE SODIUM 2 TABLET: 8.6; 5 TABLET ORAL at 08:15

## 2023-08-19 RX ADMIN — ENOXAPARIN SODIUM 40 MG: 100 INJECTION SUBCUTANEOUS at 08:15

## 2023-08-19 RX ADMIN — SODIUM CHLORIDE, PRESERVATIVE FREE 5 ML: 5 INJECTION INTRAVENOUS at 20:51

## 2023-08-19 RX ADMIN — BUSPIRONE HYDROCHLORIDE 15 MG: 15 TABLET ORAL at 08:15

## 2023-08-19 RX ADMIN — CLONAZEPAM 0.25 MG: 0.5 TABLET ORAL at 08:15

## 2023-08-19 RX ADMIN — PROPRANOLOL HYDROCHLORIDE 20 MG: 20 TABLET ORAL at 08:15

## 2023-08-19 RX ADMIN — PROPRANOLOL HYDROCHLORIDE 20 MG: 20 TABLET ORAL at 19:43

## 2023-08-19 RX ADMIN — DULOXETINE HYDROCHLORIDE 60 MG: 30 CAPSULE, DELAYED RELEASE ORAL at 08:14

## 2023-08-19 RX ADMIN — SODIUM CHLORIDE, PRESERVATIVE FREE 10 ML: 5 INJECTION INTRAVENOUS at 08:44

## 2023-08-19 RX ADMIN — BUSPIRONE HYDROCHLORIDE 15 MG: 15 TABLET ORAL at 19:43

## 2023-08-19 RX ADMIN — CLONAZEPAM 0.25 MG: 0.5 TABLET ORAL at 19:43

## 2023-08-19 RX ADMIN — DULOXETINE HYDROCHLORIDE 60 MG: 30 CAPSULE, DELAYED RELEASE ORAL at 19:43

## 2023-08-19 ASSESSMENT — PAIN SCALES - GENERAL
PAINLEVEL_OUTOF10: 0

## 2023-08-19 NOTE — PLAN OF CARE
Patient in no apparent distress at this time. No falls or new injuries noted.   Will continue to monitor

## 2023-08-19 NOTE — CARE COORDINATION
Transitional planning:  Lucie Hernandeza at [facility] and left vm inquiring if pt has been accepted.

## 2023-08-19 NOTE — PLAN OF CARE
Problem: Discharge Planning  Goal: Discharge to home or other facility with appropriate resources  8/19/2023 0705 by Oh Borjas RN  Outcome: Progressing  8/18/2023 2238 by Mary Jo Weiner RN  Outcome: Progressing     Problem: Skin/Tissue Integrity  Goal: Absence of new skin breakdown  Description: 1. Monitor for areas of redness and/or skin breakdown  2. Assess vascular access sites hourly  3. Every 4-6 hours minimum:  Change oxygen saturation probe site  4. Every 4-6 hours:  If on nasal continuous positive airway pressure, respiratory therapy assess nares and determine need for appliance change or resting period. 8/19/2023 0705 by Oh Borjas RN  Outcome: Progressing  8/18/2023 2238 by Mary Jo Weiner RN  Outcome: Progressing     Problem: Safety - Adult  Goal: Free from fall injury  8/19/2023 0705 by Oh Borjas RN  Outcome: Progressing  8/18/2023 2238 by Mary Jo Weiner RN  Outcome: Progressing     Problem: ABCDS Injury Assessment  Goal: Absence of physical injury  8/19/2023 0705 by Oh Borjas RN  Outcome: Progressing  8/18/2023 2238 by Mary Jo Weiner RN  Outcome: Progressing     Problem: Confusion  Goal: Confusion, delirium, dementia, or psychosis is improved or at baseline  Description: INTERVENTIONS:  1. Assess for possible contributors to thought disturbance, including medications, impaired vision or hearing, underlying metabolic abnormalities, dehydration, psychiatric diagnoses, and notify attending LIP  2. [place] high risk fall precautions, as indicated  3. Provide frequent short contacts to provide reality reorientation, refocusing and direction  4. Decrease environmental stimuli, including noise as appropriate  5. Monitor and intervene to maintain adequate nutrition, hydration, elimination, sleep and activity  6. If unable to ensure safety without constant attention obtain sitter and review sitter guidelines with assigned personnel  7.  Initiate Psychosocial CNS and Spiritual Care consult,

## 2023-08-20 PROCEDURE — 6370000000 HC RX 637 (ALT 250 FOR IP)

## 2023-08-20 PROCEDURE — 97530 THERAPEUTIC ACTIVITIES: CPT

## 2023-08-20 PROCEDURE — 97116 GAIT TRAINING THERAPY: CPT

## 2023-08-20 PROCEDURE — 6370000000 HC RX 637 (ALT 250 FOR IP): Performed by: INTERNAL MEDICINE

## 2023-08-20 PROCEDURE — 2060000000 HC ICU INTERMEDIATE R&B

## 2023-08-20 PROCEDURE — 6360000002 HC RX W HCPCS

## 2023-08-20 PROCEDURE — 99232 SBSQ HOSP IP/OBS MODERATE 35: CPT | Performed by: HOSPITALIST

## 2023-08-20 PROCEDURE — 2580000003 HC RX 258

## 2023-08-20 PROCEDURE — 97535 SELF CARE MNGMENT TRAINING: CPT

## 2023-08-20 PROCEDURE — 99232 SBSQ HOSP IP/OBS MODERATE 35: CPT | Performed by: INTERNAL MEDICINE

## 2023-08-20 RX ORDER — HALOPERIDOL 1 MG/1
0.5 TABLET ORAL ONCE
Status: COMPLETED | OUTPATIENT
Start: 2023-08-20 | End: 2023-08-20

## 2023-08-20 RX ORDER — 0.9 % SODIUM CHLORIDE 0.9 %
500 INTRAVENOUS SOLUTION INTRAVENOUS ONCE
Status: COMPLETED | OUTPATIENT
Start: 2023-08-20 | End: 2023-08-20

## 2023-08-20 RX ADMIN — ATORVASTATIN CALCIUM 20 MG: 20 TABLET, FILM COATED ORAL at 07:47

## 2023-08-20 RX ADMIN — BUSPIRONE HYDROCHLORIDE 15 MG: 15 TABLET ORAL at 13:10

## 2023-08-20 RX ADMIN — QUETIAPINE FUMARATE 25 MG: 25 TABLET ORAL at 20:23

## 2023-08-20 RX ADMIN — STANDARDIZED SENNA CONCENTRATE AND DOCUSATE SODIUM 2 TABLET: 8.6; 5 TABLET ORAL at 07:47

## 2023-08-20 RX ADMIN — DULOXETINE HYDROCHLORIDE 60 MG: 30 CAPSULE, DELAYED RELEASE ORAL at 07:47

## 2023-08-20 RX ADMIN — PROPRANOLOL HYDROCHLORIDE 20 MG: 20 TABLET ORAL at 07:48

## 2023-08-20 RX ADMIN — SODIUM CHLORIDE, PRESERVATIVE FREE 10 ML: 5 INJECTION INTRAVENOUS at 20:24

## 2023-08-20 RX ADMIN — DULOXETINE HYDROCHLORIDE 60 MG: 30 CAPSULE, DELAYED RELEASE ORAL at 20:23

## 2023-08-20 RX ADMIN — BUSPIRONE HYDROCHLORIDE 15 MG: 15 TABLET ORAL at 20:23

## 2023-08-20 RX ADMIN — SODIUM CHLORIDE, PRESERVATIVE FREE 10 ML: 5 INJECTION INTRAVENOUS at 07:48

## 2023-08-20 RX ADMIN — HALOPERIDOL 0.5 MG: 1 TABLET ORAL at 15:05

## 2023-08-20 RX ADMIN — PROPRANOLOL HYDROCHLORIDE 20 MG: 20 TABLET ORAL at 20:24

## 2023-08-20 RX ADMIN — CLONAZEPAM 0.25 MG: 0.5 TABLET ORAL at 07:48

## 2023-08-20 RX ADMIN — CLONAZEPAM 0.25 MG: 0.5 TABLET ORAL at 20:23

## 2023-08-20 RX ADMIN — ENOXAPARIN SODIUM 40 MG: 100 INJECTION SUBCUTANEOUS at 07:48

## 2023-08-20 RX ADMIN — SODIUM CHLORIDE 500 ML: 9 INJECTION, SOLUTION INTRAVENOUS at 00:59

## 2023-08-20 RX ADMIN — BUSPIRONE HYDROCHLORIDE 15 MG: 15 TABLET ORAL at 07:47

## 2023-08-20 ASSESSMENT — PAIN SCALES - GENERAL
PAINLEVEL_OUTOF10: 0

## 2023-08-20 NOTE — PLAN OF CARE
Problem: Discharge Planning  Goal: Discharge to home or other facility with appropriate resources  Outcome: Progressing     Problem: Skin/Tissue Integrity  Goal: Absence of new skin breakdown  Description: 1. Monitor for areas of redness and/or skin breakdown  2. Assess vascular access sites hourly  3. Every 4-6 hours minimum:  Change oxygen saturation probe site  4. Every 4-6 hours:  If on nasal continuous positive airway pressure, respiratory therapy assess nares and determine need for appliance change or resting period. Outcome: Progressing     Problem: Safety - Adult  Goal: Free from fall injury  Outcome: Progressing     Problem: ABCDS Injury Assessment  Goal: Absence of physical injury  Outcome: Progressing     Problem: Confusion  Goal: Confusion, delirium, dementia, or psychosis is improved or at baseline  Description: INTERVENTIONS:  1. Assess for possible contributors to thought disturbance, including medications, impaired vision or hearing, underlying metabolic abnormalities, dehydration, psychiatric diagnoses, and notify attending LIP  2. [place] high risk fall precautions, as indicated  3. Provide frequent short contacts to provide reality reorientation, refocusing and direction  4. Decrease environmental stimuli, including noise as appropriate  5. Monitor and intervene to maintain adequate nutrition, hydration, elimination, sleep and activity  6. If unable to ensure safety without constant attention obtain sitter and review sitter guidelines with assigned personnel  7.  Initiate Psychosocial CNS and Spiritual Care consult, as indicated  Outcome: Progressing     Problem: Nutrition Deficit:  Goal: Optimize nutritional status  Outcome: Progressing     Problem: Pain  Goal: Verbalizes/displays adequate comfort level or baseline comfort level  Outcome: Progressing

## 2023-08-20 NOTE — PROGRESS NOTES
[facility]  Internal Medicine Teaching Residency Program  Inpatient Daily Progress Note  ______________________________________________________________________________    Patient: Rosario Reina  YOB: 1940   IIL:[id number]    Acct: [de-identified]     Room: [address] [place] [address]-  Admit date: 8/10/2023  Today's date: 08/19/23  Number of days in the hospital: 9    SUBJECTIVE   Admitting Diagnosis: Delirium  CC: Altered Mental status    Pt examined at bedside. Chart & results reviewed.   -No acute events overnight.  - Patient is oriented to person and not anxious this morning.   - Patient is hemodynamically stable, afebrile and maintaining oxygen saturation on room air.  -Pending pre-CERT to facility. ROS:  Unable to review her systems because she is noncooperative    BRIEF HISTORY     The patient is a 80 y.o. female with past medical history of generalized anxiety disorder, parkinsonism, colitis, acute urinary retention, OLIVA presented  with altered mental status. As per her son the patient is being disoriented for more than 24 hours in the nursing facility. As per the medical record she was admitted in the hospital on 7/2/2023 for the management of generalized anxiety disorder. The patient had repeated episodes of anxiety and tachycardia throughout her hospital stay and she was managed with Ativan and Seroquel as needed, later it was changed to Klonopin by psychiatrist.  During her hospital stay she had a colitis episode for which she was treated with antibiotics and was catheterized with the [place] for acute urinary retention and she was asked to follow-up with urology outpatient for [facility]. On 7/27/2023 patient presented to the urology office for evaluation of urinary retention, at that time as per urology note the patient's urinary catheter was not in place and it fell out.   Additionally according to that note the patient had decreased urinary stream, urinary

## 2023-08-20 NOTE — PROGRESS NOTES
Patient urine output from 1900 to 0000 was 90 mL. On call resident was notified    500 mL NS bolus was ordered.  Oral intake of fluids is being encouraged

## 2023-08-20 NOTE — PROGRESS NOTES
Physical Therapy  Facility/Department: [address] STEPDOWN  Physical Therapy Treatment Note    Name: Paul Wilson  : 1940  MRN: [id number]  Date of Service: 2023    Discharge Recommendations:  Patient would benefit from continued therapy after discharge   PT Equipment Recommendations  Equipment Needed: No  Other: pt unsafe to attempt any aspect of mobility without significant physical assistance      Patient Diagnosis(es): The primary encounter diagnosis was Septicemia ([address]). Diagnoses of Altered mental status, unspecified altered mental status type, Acute cystitis without hematuria, and OUSMANE (generalized anxiety disorder) were also pertinent to this visit. Past Medical History:  has a past medical history of OLIVA (acute kidney injury) ([address]), Anxiety and depression, Depression, GERD (gastroesophageal reflux disease), and Weight loss. Past Surgical History:  has a past surgical history that includes Spine surgery; Tonsillectomy; Colonoscopy (2011); eye surgery; Upper gastrointestinal endoscopy (2014); Colonoscopy (2014); Endoscopy, colon, diagnostic; Coccyx removal (1950); and Breast biopsy (Right, ). Assessment   Body Structures, Functions, Activity Limitations Requiring Skilled Therapeutic Intervention: Decreased functional mobility ; Increased pain;Decreased posture;Decreased strength;Decreased safe awareness;Decreased cognition;Decreased coordination;Decreased balance  Assessment: Pt required min A for bed mobility and Eduardo x2 for amb 3ft the additional 15ft with use of RW. Pt unsteady t/o mobility and resistant to increasing amb distance.  Pt most limited by balance impairments, decreased cognition, profound anxiety and generalized weakness  Therapy Prognosis: Good  Requires PT Follow-Up: Yes  Activity Tolerance  Activity Tolerance: Patient limited by fatigue;Patient limited by endurance;Treatment limited secondary to decreased cognition     Plan   Physcial Therapy balance assessed with RW, seated balance assessed EOB and on [facility]  Exercise Treatment: pt declined exs, stated repeatedly \"i just want to lay back down\" then proceeded to return to supine without warning    AM-PAC Score  AM-PAC Inpatient Mobility Raw Score : 14 (08/20/23 1505)  AM-PAC Inpatient T-Scale Score : 38.1 (08/20/23 1505)  Mobility Inpatient CMS 0-100% Score: 61.29 (08/20/23 1505)  Mobility Inpatient CMS G-Code Modifier : CL (08/20/23 1505)    Goals  Short Term Goals  Time Frame for Short Term Goals: 14 visits  Short Term Goal 1: Supine to/from sit with SBA. Short Term Goal 2: Sit to/from stand with SBA. Short Term Goal 3: Ambulate 48' with walker with CGA. Education  Patient Education  Education Given To: Patient  Education Provided: Role of Therapy;Plan of Care;Transfer Training;Precautions;Orientation  Education Method: Demonstration;Verbal  Barriers to Learning: Cognition; Other (Comment) (anxiety)  Education Outcome: Continued education needed      Therapy Time   Individual Concurrent Group Co-treatment   Time In 1313         Time Out 1329         Minutes 16         Timed Code Treatment Minutes: 8 Minutes (co-treat with DELCID for pt safety)       Scott Miller, PTA

## 2023-08-20 NOTE — CARE COORDINATION
Transitional planning:  Reviewing orders, do not see PT/OT orders. PS Dr. Bety Wheeler team requesting orders, since [facility] is reviewing for placement. 0806 hrs. Dr. Virginia Mariee here and states pt is ready for placement to SNF.

## 2023-08-20 NOTE — PROGRESS NOTES
Occupational Therapy  Facility/Department: [address] STEPDOWN  Occupational Therapy Daily Treatment Note    Name: Paul Wilson  : 1940  MRN: [id number]  Date of Service: 2023    Discharge Recommendations:  Patient would benefit from continued therapy after discharge          Patient Diagnosis(es): The primary encounter diagnosis was Septicemia [facility]). Diagnoses of Altered mental status, unspecified altered mental status type, Acute cystitis without hematuria, and OSUMANE (generalized anxiety disorder) were also pertinent to this visit. Past Medical History:  has a past medical history of OLIVA (acute kidney injury) ([address]), Anxiety and depression, Depression, GERD (gastroesophageal reflux disease), and Weight loss. Past Surgical History:  has a past surgical history that includes Spine surgery; Tonsillectomy; Colonoscopy (2011); eye surgery; Upper gastrointestinal endoscopy (2014); Colonoscopy (2014); Endoscopy, colon, diagnostic; Coccyx removal (1950); and Breast biopsy (Right, ). Treatment Diagnosis: Delirium      Assessment   Performance deficits / Impairments: Decreased functional mobility ; Decreased safe awareness;Decreased balance;Decreased ADL status; Decreased endurance;Decreased high-level IADLs;Decreased cognition  Assessment: Pt continues to require assist for all ADL tasks and transfers this session. Pt limited with all direction following this session and required increased time for all tasks.   Treatment Diagnosis: Delirium  Prognosis: Fair  REQUIRES OT FOLLOW-UP: Yes  Activity Tolerance  Activity Tolerance: Treatment limited secondary to decreased cognition;Treatment limited secondary to medical complications (free text)  Activity Tolerance Comments: Feeling anxious and visible tremors in UE, hx of Parkinson's disease        Plan   Occupational Therapy Plan  Times Per Week: 3 x week     Restrictions  Restrictions/Precautions  Restrictions/Precautions: Up as Tolerated,

## 2023-08-20 NOTE — PROGRESS NOTES
[facility] - Progress Note    Today's Date and Time: 8/20/2023, 11:39 AM    Impression :   Altered mentation  Recent hx MRSA UTI on 7/27/23-Treated with Macrobid  Urinary retention  Hypokalemia  GERD  Anxiety  Parkinson's    Recommendations:   Monitor off antibiotics   D/C IV Vancomycin- No significant growth on urine culture from 8/10/23  Urology recommendations    Medical Decision Making/Summary/Discussion:8/20/2023       Infection Control Recommendations   [place] Precautions  Contact Precautions for MRSA hx    Antimicrobial Stewardship Recommendations     Discontinuation of therapy  Coordination of Outpatient Care:   Estimated Length of IV antimicrobials:NA  Patient will need Midline Catheter Insertion: No  Patient will need PICC line Insertion:No  Patient will need: Home IV , [address] [place],  SNF,  LTAC: No  Patient will need outpatient wound care: No    Chief complaint/reason for consultation:   MRSA UTI      History of Present Illness:   Alicia Galloway is a 80y.o.-year-old  female who was initially admitted on 8/10/2023. Patient seen at the request of Dr. Brandy Crews:    This patient, who sees Dr. Carol Ann Yates, with Urology, for urinary retention, and was treated for a MRSA UTI with Macrobid at the end of July 2023, presented to the ED from her nursing home with confusion x 24 hrs on 8/10/23. She has a history of anxiety that is treated with Klonopin. Her right pupil was noted to be dilated. A CT of her head was negative for any acute abnormalities. Labwork was mostly unremarkable other than hypokalemia and slightly elevated creatinine. The patient was given fluids and was administered a dose of IV Rocephin and was initiated on IV Vancomycin. Blood cultures have yielded no growth thus far and the urine culture shows no significant growth. Urology was consulted and a CT abd/pelvis was ordered.      ID was consulted for patient 10ML     Culture NO GROWTH 5 DAYS          Contains abnormal data Culture, Urine  Order: [id number]  Status: Final result     Visible to patient: Yes (not seen)     Next appt: 09/07/2023 at 09:15 AM in Family Medicine ([address], [place])     Dx: Dysuria     Specimen Information: Urine, straight catheter   2 Result Notes      Component    Specimen Description . URINE,STRAIGHT CATHETER    Culture METHICILLIN RESISTANT STAPHYLOCOCCUS AUREUS >100,000 CFU/ML Abnormal     Resulting [address]        Susceptibility    Methicillin-Resistant Staphylococcus aureus (1)    Antibiotic Interpretation Microscan Method Status    penicillin Resistant >=0.5 BACTERIAL SUSCEPTIBILITY PANEL SIDDHARTH Final    gentamicin Sensitive <=0.5 BACTERIAL SUSCEPTIBILITY PANEL SIDDHARTH Final     Gentamicin is used only in combination with other active agents that test susceptible. levofloxacin Intermediate 4 BACTERIAL SUSCEPTIBILITY PANEL SIDDHARTH Final    nitrofurantoin Sensitive <=16 BACTERIAL SUSCEPTIBILITY PANEL SIDDHARTH Final    oxacillin Resistant >=4 BACTERIAL SUSCEPTIBILITY PANEL SIDDHARTH Final    tetracycline Sensitive <=1 BACTERIAL SUSCEPTIBILITY PANEL SIDDHARTH Final    trimethoprim-sulfamethoxazole Sensitive <=10 BACTERIAL SUSCEPTIBILITY PANEL SIDDHARTH Final    vancomycin Sensitive 1 BACTERIAL SUSCEPTIBILITY PANEL SIDDHARTH Final                 Medical Decision Making-Other:     Note:    Thank you for allowing us to participate in the care of this patient. Please call with questions. Dylon Saldana MD          ATTESTATION:    I have discussed the case, including pertinent history and exam findings with the medical resident. I have evaluated the  History, physical findings and pictures of the patient and the key elements of the encounter have been performed by me. I have reviewed the laboratory data, other diagnostic studies and discussed them with the medical resident. I have updated the medical record where necessary.     I agree with the

## 2023-08-21 LAB
ANION GAP SERPL CALCULATED.3IONS-SCNC: 13 MMOL/L (ref 9–17)
BASOPHILS # BLD: 0.05 K/UL (ref 0–0.2)
BASOPHILS NFR BLD: 1 % (ref 0–2)
BUN SERPL-MCNC: 13 MG/DL (ref 8–23)
CALCIUM SERPL-MCNC: 9.4 MG/DL (ref 8.6–10.4)
CHLORIDE SERPL-SCNC: 102 MMOL/L (ref 98–107)
CO2 SERPL-SCNC: 23 MMOL/L (ref 20–31)
CREAT SERPL-MCNC: 0.7 MG/DL (ref 0.5–0.9)
EOSINOPHIL # BLD: 0.16 K/UL (ref 0–0.44)
EOSINOPHILS RELATIVE PERCENT: 3 % (ref 1–4)
ERYTHROCYTE [DISTWIDTH] IN BLOOD BY AUTOMATED COUNT: 13.9 % (ref 11.8–14.4)
GFR SERPL CREATININE-BSD FRML MDRD: >60 ML/MIN/1.73M2
GLUCOSE SERPL-MCNC: 87 MG/DL (ref 70–99)
HCT VFR BLD AUTO: 31.5 % (ref 36.3–47.1)
HGB BLD-MCNC: 9.9 G/DL (ref 11.9–15.1)
IMM GRANULOCYTES # BLD AUTO: <0.03 K/UL (ref 0–0.3)
IMM GRANULOCYTES NFR BLD: 0 %
LYMPHOCYTES NFR BLD: 2.48 K/UL (ref 1.1–3.7)
LYMPHOCYTES RELATIVE PERCENT: 40 % (ref 24–43)
MAGNESIUM SERPL-MCNC: 1.7 MG/DL (ref 1.6–2.6)
MCH RBC QN AUTO: 30.9 PG (ref 25.2–33.5)
MCHC RBC AUTO-ENTMCNC: 31.4 G/DL (ref 28.4–34.8)
MCV RBC AUTO: 98.4 FL (ref 82.6–102.9)
MONOCYTES NFR BLD: 0.49 K/UL (ref 0.1–1.2)
MONOCYTES NFR BLD: 8 % (ref 3–12)
NEUTROPHILS NFR BLD: 48 % (ref 36–65)
NEUTS SEG NFR BLD: 3.04 K/UL (ref 1.5–8.1)
NRBC BLD-RTO: 0 PER 100 WBC
PLATELET # BLD AUTO: 298 K/UL (ref 138–453)
PMV BLD AUTO: 9.8 FL (ref 8.1–13.5)
POTASSIUM SERPL-SCNC: 3.4 MMOL/L (ref 3.7–5.3)
RBC # BLD AUTO: 3.2 M/UL (ref 3.95–5.11)
SODIUM SERPL-SCNC: 138 MMOL/L (ref 135–144)
WBC OTHER # BLD: 6.2 K/UL (ref 3.5–11.3)

## 2023-08-21 PROCEDURE — 1200000000 HC SEMI PRIVATE

## 2023-08-21 PROCEDURE — 80048 BASIC METABOLIC PNL TOTAL CA: CPT

## 2023-08-21 PROCEDURE — 6360000002 HC RX W HCPCS

## 2023-08-21 PROCEDURE — 6370000000 HC RX 637 (ALT 250 FOR IP): Performed by: INTERNAL MEDICINE

## 2023-08-21 PROCEDURE — 99232 SBSQ HOSP IP/OBS MODERATE 35: CPT | Performed by: INTERNAL MEDICINE

## 2023-08-21 PROCEDURE — 2580000003 HC RX 258

## 2023-08-21 PROCEDURE — 85025 COMPLETE CBC W/AUTO DIFF WBC: CPT

## 2023-08-21 PROCEDURE — 6370000000 HC RX 637 (ALT 250 FOR IP)

## 2023-08-21 PROCEDURE — 36415 COLL VENOUS BLD VENIPUNCTURE: CPT

## 2023-08-21 PROCEDURE — 97530 THERAPEUTIC ACTIVITIES: CPT

## 2023-08-21 PROCEDURE — APPSS30 APP SPLIT SHARED TIME 16-30 MINUTES: Performed by: NURSE PRACTITIONER

## 2023-08-21 PROCEDURE — 83735 ASSAY OF MAGNESIUM: CPT

## 2023-08-21 RX ORDER — DIPHENHYDRAMINE HYDROCHLORIDE 50 MG/ML
25 INJECTION INTRAMUSCULAR; INTRAVENOUS ONCE
Status: COMPLETED | OUTPATIENT
Start: 2023-08-21 | End: 2023-08-21

## 2023-08-21 RX ORDER — POTASSIUM CHLORIDE 20 MEQ/1
40 TABLET, EXTENDED RELEASE ORAL ONCE
Status: COMPLETED | OUTPATIENT
Start: 2023-08-21 | End: 2023-08-21

## 2023-08-21 RX ADMIN — ATORVASTATIN CALCIUM 20 MG: 20 TABLET, FILM COATED ORAL at 07:41

## 2023-08-21 RX ADMIN — STANDARDIZED SENNA CONCENTRATE AND DOCUSATE SODIUM 2 TABLET: 8.6; 5 TABLET ORAL at 07:41

## 2023-08-21 RX ADMIN — DULOXETINE HYDROCHLORIDE 60 MG: 30 CAPSULE, DELAYED RELEASE ORAL at 07:40

## 2023-08-21 RX ADMIN — ENOXAPARIN SODIUM 40 MG: 100 INJECTION SUBCUTANEOUS at 07:58

## 2023-08-21 RX ADMIN — BUSPIRONE HYDROCHLORIDE 15 MG: 15 TABLET ORAL at 13:59

## 2023-08-21 RX ADMIN — POTASSIUM CHLORIDE 40 MEQ: 1500 TABLET, EXTENDED RELEASE ORAL at 09:48

## 2023-08-21 RX ADMIN — CLONAZEPAM 0.25 MG: 0.5 TABLET ORAL at 07:42

## 2023-08-21 RX ADMIN — SODIUM CHLORIDE, PRESERVATIVE FREE 10 ML: 5 INJECTION INTRAVENOUS at 07:43

## 2023-08-21 RX ADMIN — PROPRANOLOL HYDROCHLORIDE 20 MG: 20 TABLET ORAL at 07:42

## 2023-08-21 RX ADMIN — BUSPIRONE HYDROCHLORIDE 15 MG: 15 TABLET ORAL at 07:40

## 2023-08-21 RX ADMIN — DIPHENHYDRAMINE HYDROCHLORIDE 25 MG: 50 INJECTION, SOLUTION INTRAMUSCULAR; INTRAVENOUS at 00:53

## 2023-08-21 ASSESSMENT — PAIN SCALES - GENERAL
PAINLEVEL_OUTOF10: 0
PAINLEVEL_OUTOF10: 0

## 2023-08-21 NOTE — PLAN OF CARE
Problem: Discharge Planning  Goal: Discharge to home or other facility with appropriate resources  Outcome: Progressing     Problem: Skin/Tissue Integrity  Goal: Absence of new skin breakdown  Description: 1. Monitor for areas of redness and/or skin breakdown  2. Assess vascular access sites hourly  3. Every 4-6 hours minimum:  Change oxygen saturation probe site  4. Every 4-6 hours:  If on nasal continuous positive airway pressure, respiratory therapy assess nares and determine need for appliance change or resting period. Outcome: Progressing     Problem: Safety - Adult  Goal: Free from fall injury  Outcome: Progressing     Problem: ABCDS Injury Assessment  Goal: Absence of physical injury  Outcome: Progressing     Problem: Confusion  Goal: Confusion, delirium, dementia, or psychosis is improved or at baseline  Description: INTERVENTIONS:  1. Assess for possible contributors to thought disturbance, including medications, impaired vision or hearing, underlying metabolic abnormalities, dehydration, psychiatric diagnoses, and notify attending LIP  2. [place] high risk fall precautions, as indicated  3. Provide frequent short contacts to provide reality reorientation, refocusing and direction  4. Decrease environmental stimuli, including noise as appropriate  5. Monitor and intervene to maintain adequate nutrition, hydration, elimination, sleep and activity  6. If unable to ensure safety without constant attention obtain sitter and review sitter guidelines with assigned personnel  7.  Initiate Psychosocial CNS and Spiritual Care consult, as indicated  Outcome: Progressing     Problem: Nutrition Deficit:  Goal: Optimize nutritional status  Outcome: Progressing     Problem: Pain  Goal: Verbalizes/displays adequate comfort level or baseline comfort level  Outcome: Progressing

## 2023-08-21 NOTE — PROGRESS NOTES
[facility] - Progress Note    Today's Date and Time: 8/21/2023, 10:34 AM    Impression :   Altered mentation  Recent hx MRSA UTI on 7/27/23-Treated with Macrobid  Urinary retention  Hypokalemia  GERD  Anxiety  Parkinson's    Recommendations:   Monitor off antibiotics   D/C IV Vancomycin- No significant growth on urine culture from 8/10/23  Urology recommendations  Stable for D/C form ID perspective    Medical Decision Making/Summary/Discussion:8/21/2023       Infection Control Recommendations   [place] Precautions  Contact Precautions for MRSA hx    Antimicrobial Stewardship Recommendations     Discontinuation of therapy  Coordination of Outpatient Care:   Estimated Length of IV antimicrobials:NA  Patient will need Midline Catheter Insertion: No  Patient will need PICC line Insertion:No  Patient will need: Home IV , [address] [facility],  SNF,  LTAC: No  Patient will need outpatient wound care: No    Chief complaint/reason for consultation:   MRSA UTI      History of Present Illness:   Emperatriz Seals is a 80y.o.-year-old  female who was initially admitted on 8/10/2023. Patient seen at the request of Dr. Alysia Ferguson:    This patient, who sees Dr. Cheryl Chen, with Urology, for urinary retention, and was treated for a MRSA UTI with Macrobid at the end of July 2023, presented to the ED from her nursing home with confusion x 24 hrs on 8/10/23. She has a history of anxiety that is treated with Klonopin. Her right pupil was noted to be dilated. A CT of her head was negative for any acute abnormalities. Labwork was mostly unremarkable other than hypokalemia and slightly elevated creatinine. The patient was given fluids and was administered a dose of IV Rocephin and was initiated on IV Vancomycin. Blood cultures have yielded no growth thus far and the urine culture shows no significant growth.      Urology was consulted and a CT abd/pelvis was Reason for Exam: ams FINDINGS: BRAIN/VENTRICLES: There is no acute intracranial hemorrhage, mass effect or midline shift. No abnormal extra-axial fluid collection. The gray-white differentiation is maintained without evidence of an acute infarct. There is no evidence of hydrocephalus. Unchanged enlarged frontal dural spaces, mild cortical volume loss, and scattered low-attenuation white matter abnormalities. ORBITS: The visualized portion of the orbits demonstrate no acute abnormality. Status post right cataract surgery. SINUSES: The visualized paranasal sinuses and mastoid air cells demonstrate no acute abnormality. SOFT TISSUES/SKULL:  No acute abnormality of the visualized skull or soft tissues. Unchanged hyperostosis frontalis interna. No acute intracranial abnormality. Similar senescent findings, as above. XR CHEST PORTABLE    Result Date: 8/10/2023  EXAMINATION: ONE XRAY VIEW OF THE CHEST 8/10/2023 12:05 pm COMPARISON: 07/02/2023 HISTORY: ORDERING SYSTEM PROVIDED HISTORY: AMS TECHNOLOGIST PROVIDED HISTORY: AMS Reason for Exam: altered mental status, port upr. FINDINGS: Cardiomediastinal silhouette and pulmonary vasculature are within normal limits. No focal airspace consolidation, pneumothorax, or pleural effusion. No free air beneath the diaphragm. No acute osseous abnormality. No acute intrathoracic process. Medical Decision Yfhxmu-Avovtjke-Rtzok:     Results       Procedure Component Value Units Date/Time    Culture, Urine [[id number]] Collected: 08/10/23 1222    Order Status: Completed Specimen: Urine, straight catheter Updated: 08/11/23 1111     Specimen Description . URINE,STRAIGHT CATHETER     Culture NO SIGNIFICANT GROWTH    Blood Culture 1 [[id number]] Collected: 08/10/23 1109    Order Status: Completed Specimen: Blood Updated: 08/15/23 1146     Specimen Description . BLOOD     Special Requests RAC 10ML     Culture NO GROWTH 5 DAYS    Culture, Blood 2 [[id number]] Collected: 08/10/23 1109    Order Status: Completed Specimen: Blood Updated: 08/15/23 1146     Specimen Description . BLOOD     Special Requests LAC 10ML     Culture NO GROWTH 5 DAYS          Contains abnormal data Culture, Urine  Order: [id number]  Status: Final result     Visible to patient: Yes (not seen)     Next appt: 09/07/2023 at 09:15 AM in Family Medicine ([address], [place])     Dx: Dysuria     Specimen Information: Urine, straight catheter   2 Result Notes      Component    Specimen Description . URINE,STRAIGHT CATHETER    Culture METHICILLIN RESISTANT STAPHYLOCOCCUS AUREUS >100,000 CFU/ML Abnormal     Resulting [address]        Susceptibility    Methicillin-Resistant Staphylococcus aureus (1)    Antibiotic Interpretation Microscan Method Status    penicillin Resistant >=0.5 BACTERIAL SUSCEPTIBILITY PANEL SIDDHARTH Final    gentamicin Sensitive <=0.5 BACTERIAL SUSCEPTIBILITY PANEL SIDDHARTH Final     Gentamicin is used only in combination with other active agents that test susceptible. levofloxacin Intermediate 4 BACTERIAL SUSCEPTIBILITY PANEL SIDDHARTH Final    nitrofurantoin Sensitive <=16 BACTERIAL SUSCEPTIBILITY PANEL SIDDHARTH Final    oxacillin Resistant >=4 BACTERIAL SUSCEPTIBILITY PANEL SIDDHARTH Final    tetracycline Sensitive <=1 BACTERIAL SUSCEPTIBILITY PANEL SIDDHARTH Final    trimethoprim-sulfamethoxazole Sensitive <=10 BACTERIAL SUSCEPTIBILITY PANEL SIDDHARTH Final    vancomycin Sensitive 1 BACTERIAL SUSCEPTIBILITY PANEL SIDDHARTH Final                 Medical Decision Making-Other:     Note:    Thank you for allowing us to participate in the care of this patient. Please call with questions. Jackquline Meckel, APRN - CNP      ATTESTATION:    I have discussed the case, including pertinent history and exam findings with the APRN. I have evaluated the  History, physical findings and pictures of the patient and the key elements of the encounter have been performed by me.  I have reviewed the laboratory data, other

## 2023-08-21 NOTE — PROGRESS NOTES
Physical Therapy  Facility/Department: [address] STEPDOWN  Physical Therapy Daily Treatment Note    Name: Rebecca Aldrich  : 1940  MRN: [id number]  Date of Service: 2023    Discharge Recommendations:  Patient would benefit from continued therapy after discharge   PT Equipment Recommendations  Equipment Needed: No  Other: pt unsafe to attempt any aspect of mobility without significant physical assistance      Patient Diagnosis(es): The primary encounter diagnosis was Septicemia ([address]). Diagnoses of Altered mental status, unspecified altered mental status type, Acute cystitis without hematuria, and OUSMANE (generalized anxiety disorder) were also pertinent to this visit. Past Medical History:  has a past medical history of OLIVA (acute kidney injury) ([address]), Anxiety and depression, Depression, GERD (gastroesophageal reflux disease), and Weight loss. Past Surgical History:  has a past surgical history that includes Spine surgery; Tonsillectomy; Colonoscopy (2011); eye surgery; Upper gastrointestinal endoscopy (2014); Colonoscopy (2014); Endoscopy, colon, diagnostic; Coccyx removal (1950); and Breast biopsy (Right, ). Assessment   Body Structures, Functions, Activity Limitations Requiring Skilled Therapeutic Intervention: Decreased functional mobility ; Increased pain;Decreased posture;Decreased strength;Decreased safe awareness;Decreased cognition;Decreased coordination;Decreased balance  Assessment: Pt ambulated 10ft x2 with RW Eduardo, pt demonstrates signifcant cognition deficits and would be currently unsafe to perform functional mobility unassisted at this time due to cognition deficits. Recommending continued skilled physical therapy to address continued functional mobility deficits to return pt to prior level of function.   Therapy Prognosis: Good  Decision Making: Medium Complexity  Requires PT Follow-Up: Yes  Activity Tolerance  Activity Tolerance: Patient limited by directions; Attends with cues to redirect  Memory: Decreased recall of precautions;Decreased recall of biographical Information  Safety Judgement: Decreased awareness of need for safety;Decreased awareness of need for assistance  Problem Solving: Assistance required to correct errors made;Decreased awareness of errors  Insights: Not aware of deficits  Initiation: Requires cues for some  Sequencing: Requires cues for all  Cognition Comment: pt very anxious to situation, reports \"looking for brother\" throughout treatment     Objective   Bed mobility  Supine to Sit: Minimal assistance  Sit to Supine: Contact guard assistance  Scooting: Minimal assistance  Bed Mobility Comments: VC's required for sequencing and initiation with poor return demo  Transfers  Sit to Stand: Minimal Assistance  Stand to Sit: Minimal Assistance  Stand Pivot Transfers: Minimal Assistance (Stand pivot performed to and from bedside commode)  Comment: Max verbal cueing required for sequencing and initation with fair return demo  Ambulation  Surface: Level tile  Device: Rolling Walker  Assistance: Minimal assistance  Quality of Gait: stiff, short and choppy gait, decreased endurance, assistance required to navigate RW and to prevent posterior LOB  Gait Deviations: Slow Pascale; Shuffles;Decreased step length;Decreased step height;Deviated path  Distance: 10ft x2  Comments: Pt refused further ambulation this date, pt difficult to re-orient to situation, pt \"requesting to lay back down\" repeatitively. More Ambulation?: No  Stairs/Curb  Stairs?: No     Balance  Posture: Fair  Sitting - Static: Fair  Sitting - Dynamic: -;Fair  Standing - Static: Poor;+  Standing - Dynamic: Poor;+  Comments: standing balance assessed with RW, pt able to sit EOB CGA  Exercises refused performance of exercises.    AM-PAC Score  -PAC Inpatient Mobility Raw Score : 16 (08/21/23 1437)  AM-PAC Inpatient T-Scale Score : 40.78 (08/21/23 1437)  Mobility Inpatient CMS 0-100%

## 2023-08-21 NOTE — PLAN OF CARE
Problem: Discharge Planning  Goal: Discharge to home or other facility with appropriate resources  8/21/2023 1019 by Gissel Morales RN  Outcome: Progressing  8/20/2023 2212 by Deandra De León RN  Outcome: Progressing     Problem: Skin/Tissue Integrity  Goal: Absence of new skin breakdown  Description: 1. Monitor for areas of redness and/or skin breakdown  2. Assess vascular access sites hourly  3. Every 4-6 hours minimum:  Change oxygen saturation probe site  4. Every 4-6 hours:  If on nasal continuous positive airway pressure, respiratory therapy assess nares and determine need for appliance change or resting period. 8/21/2023 1019 by Gissel Morales RN  Outcome: Progressing  8/20/2023 2212 by Deandra De León RN  Outcome: Progressing     Problem: Safety - Adult  Goal: Free from fall injury  8/21/2023 1019 by Gissel Morales RN  Outcome: Progressing  8/20/2023 2212 by Deandra De León RN  Outcome: Progressing     Problem: ABCDS Injury Assessment  Goal: Absence of physical injury  8/21/2023 1019 by Gissel Morales RN  Outcome: Progressing  8/20/2023 2212 by Deandra De León RN  Outcome: Progressing     Problem: Confusion  Goal: Confusion, delirium, dementia, or psychosis is improved or at baseline  Description: INTERVENTIONS:  1. Assess for possible contributors to thought disturbance, including medications, impaired vision or hearing, underlying metabolic abnormalities, dehydration, psychiatric diagnoses, and notify attending LIP  2. [place] high risk fall precautions, as indicated  3. Provide frequent short contacts to provide reality reorientation, refocusing and direction  4. Decrease environmental stimuli, including noise as appropriate  5. Monitor and intervene to maintain adequate nutrition, hydration, elimination, sleep and activity  6. If unable to ensure safety without constant attention obtain sitter and review sitter guidelines with assigned personnel  7.  Initiate Psychosocial CNS and Spiritual Care consult, as indicated  8/21/2023 1019 by Margaret Trinidad RN  Outcome: Progressing  8/20/2023 2212 by Hernan Brown RN  Outcome: Progressing     Problem: Nutrition Deficit:  Goal: Optimize nutritional status  8/21/2023 1019 by Margaret Trinidad RN  Outcome: Progressing  8/20/2023 2212 by Hernan Brown RN  Outcome: Progressing     Problem: Pain  Goal: Verbalizes/displays adequate comfort level or baseline comfort level  8/21/2023 1019 by Margaret Trinidad RN  Outcome: Jessica Winn  8/20/2023 2212 by Hernan Brown RN  Outcome: Progressing

## 2023-08-21 NOTE — CARE COORDINATION
Transitional planning note: plan is return to [address] [place] [address]. Spoke with Christine Dawn who states eligibility team is calling this morning to follow up on auth status again today. She states no auth received over weekend.

## 2023-08-21 NOTE — PROGRESS NOTES
[facility]  Internal Medicine Teaching Residency Program  Inpatient Daily Progress Note  ______________________________________________________________________________    Patient: Jonny Madera  YOB: 1940   SBE:[id number]    Acct: [de-identified]     Room: [address] [place] [address]-  Admit date: 8/10/2023  Today's date: 08/21/23  Number of days in the hospital: 11    SUBJECTIVE   Admitting Diagnosis: Delirium  CC: Altered Mental status    Pt examined at bedside. Chart & results reviewed. - Pt didn't sleep well so given one dose each of benadryl along with clonazepam and Seroquel last night  -Patient is oriented to person and anxious this morning  -Heart rate is in 90s and blood pressures are high, her last reading is 140/72  -Patient is on Croft's with I/O 450 ml over 24 hrs  ROS:  Unable to review her systems because she is noncooperative    BRIEF HISTORY     The patient is a 80 y.o. female with past medical history of generalized anxiety disorder, parkinsonism, colitis, acute urinary retention, OLIVA presented  with altered mental status. As per her son the patient is being disoriented for more than 24 hours in the nursing facility. As per the medical record she was admitted in the hospital on 7/2/2023 for the management of generalized anxiety disorder. The patient had repeated episodes of anxiety and tachycardia throughout her hospital stay and she was managed with Ativan and Seroquel as needed, later it was changed to Klonopin by psychiatrist.  During her hospital stay she had a colitis episode for which she was treated with antibiotics and was catheterized with the Croft for acute urinary retention and she was asked to follow-up with urology outpatient for [facility]. On 7/27/2023 patient presented to the urology office for evaluation of urinary retention, at that time as per urology note the patient's urinary catheter was not in place and it fell out. lactate, 5 mg, Q6H PRN  sodium chloride flush, 5-40 mL, PRN  sodium chloride, , PRN  ondansetron, 4 mg, Q8H PRN   Or  ondansetron, 4 mg, Q6H PRN  polyethylene glycol, 17 g, Daily PRN  acetaminophen, 650 mg, Q6H PRN   Or  acetaminophen, 650 mg, Q6H PRN        Diagnostic Labs:  CBC:   Recent Labs     08/21/23  0304   WBC 6.2   RBC 3.20*   HGB 9.9*   HCT 31.5*   MCV 98.4   RDW 13.9        BMP:   Recent Labs     08/21/23  0304      K 3.4*      CO2 23   BUN 13   CREATININE 0.7     BNP: No results for input(s): BNP in the last 72 hours. PT/INR: No results for input(s): PROTIME, INR in the last 72 hours. APTT: No results for input(s): APTT in the last 72 hours. CARDIAC ENZYMES: No results for input(s): CKMB, CKMBINDEX, TROPONINI in the last 72 hours. Invalid input(s): CKTOTAL;3  FASTING LIPID PANEL:  Lab Results   Component Value Date    CHOL 150 12/22/2022    HDL 57 12/22/2022    TRIG 64 12/22/2022     LIVER PROFILE:   No results for input(s): AST, ALT, ALB, BILIDIR, BILITOT, ALKPHOS in the last 72 hours. MICROBIOLOGY:   Lab Results   Component Value Date/Time    CULTURE NO SIGNIFICANT GROWTH 08/10/2023 12:22 PM       Imaging:    CT ABDOMEN PELVIS WO CONTRAST Additional Contrast? None    Result Date: 8/11/2023  1. No acute process in the abdomen or pelvis. Previously noted findings of colitis at the left side of the colon have improved. 2.  Moderate stool burden, suggesting constipation. 3.  No obstructive uropathy or urolithiasis. 4.  Single focus of gas noted in the urinary bladder, likely related to recent instrumentation. CT HEAD WO CONTRAST    Result Date: 8/10/2023  No acute intracranial abnormality. Similar senescent findings, as above. XR CHEST PORTABLE    Result Date: 8/10/2023  No acute intrathoracic process. ASSESSMENT & PLAN     ASSESSMENT / PLAN:       IMPRESSION  This is a 80 y.o. female who presented with altered mental status and found to have abnormal UA.

## 2023-08-21 NOTE — PROGRESS NOTES
Comprehensive Nutrition Assessment    Type and Reason for Visit:  Reassess    Nutrition Recommendations/Plan:   Continue current diet and ONS  Recommend appetite stimulant. Please weigh patient. Order placed. Malnutrition Assessment:  Malnutrition Status:  Severe malnutrition (08/12/23 1447)    Context:  Chronic Illness     Findings of the 6 clinical characteristics of malnutrition:  Energy Intake:  75% or less estimated energy requirements for 1 month or longer  Weight Loss:  Greater than 20% over 1 year     Body Fat Loss:  Mild body fat loss Orbital   Muscle Mass Loss:  Mild muscle mass loss Hand (interosseous)  Fluid Accumulation:  No significant fluid accumulation     Strength:  Not Performed    Nutrition Assessment:    Patient seen for follow up. Per EHR, consuming 1-25% of meals, 51-75% ONS. Patient noted with 3 unopened ensures during visit. Just receiving breakfast at 10:30 am. Continue to recommend appetite stimulant. K 3.4, Meds reviewed. Nutrition Related Findings:    labs/meds reviewed Wound Type: None       Current Nutrition Intake & Therapies:    Average Meal Intake: 0%, 1-25%, 26-50% (variable intake)  Average Supplements Intake: None Ordered  ADULT ORAL NUTRITION SUPPLEMENT; Lunch, Dinner; Standard High Calorie/High Protein Oral Supplement  ADULT DIET; Regular    Anthropometric Measures:  Height: 5' 4\" (162.6 cm)  Ideal Body Weight (IBW): 120 lbs (55 kg)    Admission Body Weight: 114 lb 13.8 oz (52.1 kg) (8/12)  Current Body Weight: 114 lb 13.8 oz (52.1 kg) (8/12), 95.7 % IBW.     Current BMI (kg/m2): 19.7  Usual Body Weight: 146 lb 6.4 oz (66.4 kg) (12/13/22)  % Weight Change (Calculated): -21.5  Weight Adjustment For: No Adjustment                 BMI Categories: Underweight (BMI less than 22) age over 72    Estimated Daily Nutrient Needs:  Energy Requirements Based On: Kcal/kg  Weight Used for Energy Requirements: Admission  Energy (kcal/day): 1500 to 1600 kcal.day  Weight Used for

## 2023-08-22 LAB — GLUCOSE BLD-MCNC: 91 MG/DL (ref 65–105)

## 2023-08-22 PROCEDURE — 51798 US URINE CAPACITY MEASURE: CPT

## 2023-08-22 PROCEDURE — 6360000002 HC RX W HCPCS

## 2023-08-22 PROCEDURE — 6370000000 HC RX 637 (ALT 250 FOR IP)

## 2023-08-22 PROCEDURE — 99232 SBSQ HOSP IP/OBS MODERATE 35: CPT | Performed by: INTERNAL MEDICINE

## 2023-08-22 PROCEDURE — 97530 THERAPEUTIC ACTIVITIES: CPT

## 2023-08-22 PROCEDURE — 1200000000 HC SEMI PRIVATE

## 2023-08-22 PROCEDURE — 6370000000 HC RX 637 (ALT 250 FOR IP): Performed by: INTERNAL MEDICINE

## 2023-08-22 PROCEDURE — 82947 ASSAY GLUCOSE BLOOD QUANT: CPT

## 2023-08-22 PROCEDURE — 93005 ELECTROCARDIOGRAM TRACING: CPT

## 2023-08-22 PROCEDURE — 2580000003 HC RX 258

## 2023-08-22 PROCEDURE — 97535 SELF CARE MNGMENT TRAINING: CPT

## 2023-08-22 RX ADMIN — SODIUM CHLORIDE, PRESERVATIVE FREE 10 ML: 5 INJECTION INTRAVENOUS at 09:35

## 2023-08-22 RX ADMIN — SODIUM CHLORIDE, PRESERVATIVE FREE 10 ML: 5 INJECTION INTRAVENOUS at 20:38

## 2023-08-22 RX ADMIN — BUSPIRONE HYDROCHLORIDE 15 MG: 15 TABLET ORAL at 09:32

## 2023-08-22 RX ADMIN — CLONAZEPAM 0.25 MG: 0.5 TABLET ORAL at 22:43

## 2023-08-22 RX ADMIN — QUETIAPINE FUMARATE 25 MG: 25 TABLET ORAL at 22:43

## 2023-08-22 RX ADMIN — PROPRANOLOL HYDROCHLORIDE 20 MG: 20 TABLET ORAL at 09:32

## 2023-08-22 RX ADMIN — HALOPERIDOL LACTATE 5 MG: 5 INJECTION, SOLUTION INTRAMUSCULAR at 13:08

## 2023-08-22 RX ADMIN — DULOXETINE HYDROCHLORIDE 60 MG: 30 CAPSULE, DELAYED RELEASE ORAL at 09:32

## 2023-08-22 RX ADMIN — DULOXETINE HYDROCHLORIDE 60 MG: 30 CAPSULE, DELAYED RELEASE ORAL at 22:43

## 2023-08-22 RX ADMIN — BUSPIRONE HYDROCHLORIDE 15 MG: 15 TABLET ORAL at 22:43

## 2023-08-22 RX ADMIN — CLONAZEPAM 0.25 MG: 0.5 TABLET ORAL at 09:32

## 2023-08-22 NOTE — PROGRESS NOTES
[facility] - Progress Note    Today's Date and Time: 8/22/2023, 10:58 AM    Impression :   Altered mentation  Recent hx MRSA UTI on 7/27/23-Treated with Macrobid  Urinary retention  Hypokalemia  GERD  Anxiety  Parkinson's    Recommendations:   Monitor off antibiotics   D/C IV Vancomycin- No significant growth on urine culture from 8/10/23  Urology recommendations  Stable for D/C form ID perspective    Medical Decision Making/Summary/Discussion:8/22/2023       Infection Control Recommendations   [place] Precautions  Contact Precautions for MRSA hx    Antimicrobial Stewardship Recommendations     Discontinuation of therapy  Coordination of Outpatient Care:   Estimated Length of IV antimicrobials:NA  Patient will need Midline Catheter Insertion: No  Patient will need PICC line Insertion:No  Patient will need: Home IV , [address] [place],  SNF,  LTAC: No  Patient will need outpatient wound care: No    Chief complaint/reason for consultation:   MRSA UTI      History of Present Illness:   Eliud Turner is a 80y.o.-year-old  female who was initially admitted on 8/10/2023. Patient seen at the request of Dr. Jared Cosby:    This patient, who sees Dr. Levar Walls, with Urology, for urinary retention, and was treated for a MRSA UTI with Macrobid at the end of July 2023, presented to the ED from her nursing home with confusion x 24 hrs on 8/10/23. She has a history of anxiety that is treated with Klonopin. Her right pupil was noted to be dilated. A CT of her head was negative for any acute abnormalities. Labwork was mostly unremarkable other than hypokalemia and slightly elevated creatinine. The patient was given fluids and was administered a dose of IV Rocephin and was initiated on IV Vancomycin. Blood cultures have yielded no growth thus far and the urine culture shows no significant growth.      Urology was consulted and a CT abd/pelvis was Status: Completed Specimen: Blood Updated: 08/15/23 1146     Specimen Description . BLOOD     Special Requests LAC 10ML     Culture NO GROWTH 5 DAYS          Contains abnormal data Culture, Urine  Order: [id number]  Status: Final result     Visible to patient: Yes (not seen)     Next appt: 09/07/2023 at 09:15 AM in Family Medicine ([address], [place])     Dx: Dysuria     Specimen Information: Urine, straight catheter   2 Result Notes      Component    Specimen Description . URINE,STRAIGHT CATHETER    Culture METHICILLIN RESISTANT STAPHYLOCOCCUS AUREUS >100,000 CFU/ML Abnormal     Resulting [address]        Susceptibility    Methicillin-Resistant Staphylococcus aureus (1)    Antibiotic Interpretation Microscan Method Status    penicillin Resistant >=0.5 BACTERIAL SUSCEPTIBILITY PANEL SIDDHARTH Final    gentamicin Sensitive <=0.5 BACTERIAL SUSCEPTIBILITY PANEL SIDDHARTH Final     Gentamicin is used only in combination with other active agents that test susceptible. levofloxacin Intermediate 4 BACTERIAL SUSCEPTIBILITY PANEL SIDDHARTH Final    nitrofurantoin Sensitive <=16 BACTERIAL SUSCEPTIBILITY PANEL SIDDHARTH Final    oxacillin Resistant >=4 BACTERIAL SUSCEPTIBILITY PANEL SIDDHARTH Final    tetracycline Sensitive <=1 BACTERIAL SUSCEPTIBILITY PANEL SIDDHARTH Final    trimethoprim-sulfamethoxazole Sensitive <=10 BACTERIAL SUSCEPTIBILITY PANEL SIDDHARTH Final    vancomycin Sensitive 1 BACTERIAL SUSCEPTIBILITY PANEL SIDDHARTH Final                 Medical Decision Making-Other:     Note:    Thank you for allowing us to participate in the care of this patient. Please call with questions. Robert Schreiber MD          ATTESTATION:    I have discussed the case, including pertinent history and exam findings with the medical resident. I have evaluated the  History, physical findings and pictures of the patient and the key elements of the encounter have been performed by me.  I have reviewed the laboratory data, other diagnostic studies and discussed them with the medical resident. I have updated the medical record where necessary. I agree with the assessment, plan and orders as documented by the medical resident and I have modified them as necessary. Elements of Medical Decision Making:  Note: I have independently performed the steps listed below as part of the medical decision making and evaluation. Examined and discussed with patient. Labs, medications, radiologic studies were reviewed with personal review of films  Large amounts of data were reviewed  Discussed with nursing Staff, Discharge planner  Infection Control and Prevention measures reviewed  All prior entries were reviewed  Administer medications as ordered  Prognosis: [place]  Discharge planning reviewed  Follow up as outpatient.     Rahat Adler MD.

## 2023-08-22 NOTE — PLAN OF CARE
Problem: Discharge Planning  Goal: Discharge to home or other facility with appropriate resources  Outcome: Progressing     Problem: Skin/Tissue Integrity  Goal: Absence of new skin breakdown  Description: 1. Monitor for areas of redness and/or skin breakdown  2. Assess vascular access sites hourly  3. Every 4-6 hours minimum:  Change oxygen saturation probe site  4. Every 4-6 hours:  If on nasal continuous positive airway pressure, respiratory therapy assess nares and determine need for appliance change or resting period.   8/22/2023 1622 by Mayelin Munoz RN  Outcome: Progressing  8/22/2023 0405 by Vicki Dove RN  Outcome: Progressing     Problem: Safety - Adult  Goal: Free from fall injury  Outcome: Progressing

## 2023-08-22 NOTE — PROGRESS NOTES
[facility]  Internal Medicine Teaching Residency Program  Inpatient Daily Progress Note  ______________________________________________________________________________    Patient: Farida Lopez  YOB: 1940   LIL:[id number]    Acct: [de-identified]     Room: [address] [place] [address]  Admit date: 8/10/2023  Today's date: 08/22/23  Number of days in the hospital: 12    SUBJECTIVE   Admitting Diagnosis: Delirium  CC: Altered Mental status    Pt examined at bedside. Chart & results reviewed. - Pt didn't sleep well so given one dose each of benadryl along with clonazepam and Seroquel last night  -Patient is oriented to person and anxious this morning  -Heart rate is in 90s and blood pressures are high, her last reading is 140/72  -Patient is on Croft's with I/O 0 ml over 24 hrs  ROS:  Unable to review her systems because she is noncooperative    BRIEF HISTORY     The patient is a 80 y.o. female with past medical history of generalized anxiety disorder, parkinsonism, colitis, acute urinary retention, OLIVA presented  with altered mental status. As per her son the patient is being disoriented for more than 24 hours in the nursing facility. As per the medical record she was admitted in the hospital on 7/2/2023 for the management of generalized anxiety disorder. The patient had repeated episodes of anxiety and tachycardia throughout her hospital stay and she was managed with Ativan and Seroquel as needed, later it was changed to Klonopin by psychiatrist.  During her hospital stay she had a colitis episode for which she was treated with antibiotics and was catheterized with the Croft for acute urinary retention and she was asked to follow-up with urology outpatient for [facility]. On 7/27/2023 patient presented to the urology office for evaluation of urinary retention, at that time as per urology note the patient's urinary catheter was not in place and it fell out.   Additionally Mg  GI ppx: Not indicated  Diet: No diet orders on file    PT/OT : Consulted  Discharge planning: Planning for discharge    Germain Zaragoza MD  Internal Medicine Resident, PGY-1  [address], [place], [place].  8/22/2023, 7:07 AM

## 2023-08-22 NOTE — CARE COORDINATION
Transitional planning    Writer to room to update patient, waiting on insurance auth to return to [facility]. 1100 Writer placed al to [facility], left vm on admissions line with request for call back. 1450 call to Shannon Bang at [facility], still doesn't have auth, states insurance can take up to 2 weeks, she will continue to check daily.

## 2023-08-22 NOTE — PROGRESS NOTES
2000 pt refusing evening medications. States she would rather sleep. Pt educated and understands importance of medication schedule. Still refused. 2108 bed ready on 3B, report called to 3B RN all questions answered.

## 2023-08-22 NOTE — PROGRESS NOTES
Occupational Therapy  Facility/Department: [facility] RENAL//MED SURG  Occupational Therapy Daily Treatment Note    Name: Belkys Fontana  : 1940  MRN: [id number]  Date of Service: 2023    Discharge Recommendations:  Patient would benefit from continued therapy after discharge          Patient Diagnosis(es): The primary encounter diagnosis was Septicemia [facility]). Diagnoses of Altered mental status, unspecified altered mental status type, Acute cystitis without hematuria, and OUSMANE (generalized anxiety disorder) were also pertinent to this visit. Past Medical History:  has a past medical history of OLIVA (acute kidney injury) ([address] ), Anxiety and depression, Depression, GERD (gastroesophageal reflux disease), and Weight loss. Past Surgical History:  has a past surgical history that includes Spine surgery; Tonsillectomy; Colonoscopy (2011); eye surgery; Upper gastrointestinal endoscopy (2014); Colonoscopy (2014); Endoscopy, colon, diagnostic; Coccyx removal (1950); and Breast biopsy (Right, ). Treatment Diagnosis: Delirium      Assessment   Performance deficits / Impairments: Decreased functional mobility ; Decreased safe awareness;Decreased balance;Decreased ADL status; Decreased endurance;Decreased high-level IADLs;Decreased cognition  Assessment: Pt continues to require assist for all ADL tasks and transfers this session. Pt limited with all direction following this session and required increased time for all tasks. Treatment Diagnosis: Delirium  Prognosis: Fair  REQUIRES OT FOLLOW-UP: Yes  Activity Tolerance  Activity Tolerance: Treatment limited secondary to decreased cognition;Treatment limited secondary to medical complications (free text); Patient limited by fatigue  Activity Tolerance Comments: Feeling anxious and visible tremors in UE, hx of Parkinson's disease, overall weakness and self-limiting        Plan   Occupational Therapy Plan  Times Per Week: 3 x week impulsive and requesting get on the [facility] as soon as possible, completed face to face transfer for first transfer.)  Interventions: Safety awareness training;Verbal cues;Manual cues (VCs for placement of hands on bedrails/handrails on BSC; fair return)  Sit to Stand: Adaptive equipment; Additional time;Maximum assistance (Max A d/t pt making slow progress w/ overall general weakness and increased anxiousness to make it to the [facility] in time for 1st bout of stansing)  Stand to Sit: Adaptive equipment; Additional time;Minimum assistance  Toilet Transfer: Minimum assistance; Adaptive equipment; Additional time (w/ hand rails on BSC and RW)  Gait  Overall Level of Assistance:  (BEV; pt declined multiple times and returned to bed.)  Assistive Device: Gait belt;Walker, rolling        ADL  Feeding Skilled Clinical Factors: Cup to mouth w/ straw use required to completing drink water  Grooming: Increased time to complete;Setup;Verbal cueing;Stand by assistance  Grooming Skilled Clinical Factors: Pt completed washing face/hands while sitting EOB. VCs for initial start of task. UE Bathing: Increased time to complete;Setup;Verbal cueing;Stand by assistance  UE Bathing Skilled Clinical Factors: Pt completed washing UE. LE Dressing: Maximum assistance; Increased time to complete  LE Dressing Skilled Clinical Factors: Don slipper socks while sitting EOB, Max A d/t weakness and coordination deficits. Toileting: Maximum assistance; Increased time to complete  Toileting Skilled Clinical Factors: Pt required Max A to complete bottom care while standing from [facility] after attempting BM. Pt currently has catheter. Pt declined donning a brief this date.   Additional Comments: Pt became slightly agitated throughout session, No further ADLs attempted this date s/t pt's increased fatigue and wanting to rest.        Bed mobility  Supine to Sit: Minimal assistance (w/ progression of LE)  Sit to Supine: Stand by assistance  Scooting: Contact guard

## 2023-08-23 LAB
GLUCOSE BLD-MCNC: 82 MG/DL (ref 65–105)
GLUCOSE BLD-MCNC: 83 MG/DL (ref 65–105)
GLUCOSE BLD-MCNC: 94 MG/DL (ref 65–105)

## 2023-08-23 PROCEDURE — 82947 ASSAY GLUCOSE BLOOD QUANT: CPT

## 2023-08-23 PROCEDURE — 2580000003 HC RX 258

## 2023-08-23 PROCEDURE — 51798 US URINE CAPACITY MEASURE: CPT

## 2023-08-23 PROCEDURE — 6370000000 HC RX 637 (ALT 250 FOR IP): Performed by: INTERNAL MEDICINE

## 2023-08-23 PROCEDURE — 1200000000 HC SEMI PRIVATE

## 2023-08-23 PROCEDURE — 6370000000 HC RX 637 (ALT 250 FOR IP)

## 2023-08-23 PROCEDURE — 99232 SBSQ HOSP IP/OBS MODERATE 35: CPT | Performed by: INTERNAL MEDICINE

## 2023-08-23 RX ADMIN — CLONAZEPAM 0.25 MG: 0.5 TABLET ORAL at 09:08

## 2023-08-23 RX ADMIN — STANDARDIZED SENNA CONCENTRATE AND DOCUSATE SODIUM 2 TABLET: 8.6; 5 TABLET ORAL at 09:08

## 2023-08-23 RX ADMIN — ATORVASTATIN CALCIUM 20 MG: 20 TABLET, FILM COATED ORAL at 09:08

## 2023-08-23 RX ADMIN — QUETIAPINE FUMARATE 25 MG: 25 TABLET ORAL at 20:05

## 2023-08-23 RX ADMIN — CLONAZEPAM 0.25 MG: 0.5 TABLET ORAL at 20:05

## 2023-08-23 RX ADMIN — BUSPIRONE HYDROCHLORIDE 15 MG: 15 TABLET ORAL at 20:04

## 2023-08-23 RX ADMIN — PROPRANOLOL HYDROCHLORIDE 20 MG: 20 TABLET ORAL at 20:06

## 2023-08-23 RX ADMIN — DULOXETINE HYDROCHLORIDE 60 MG: 30 CAPSULE, DELAYED RELEASE ORAL at 09:08

## 2023-08-23 RX ADMIN — DULOXETINE HYDROCHLORIDE 60 MG: 30 CAPSULE, DELAYED RELEASE ORAL at 20:05

## 2023-08-23 RX ADMIN — BUSPIRONE HYDROCHLORIDE 15 MG: 15 TABLET ORAL at 09:08

## 2023-08-23 RX ADMIN — SODIUM CHLORIDE, PRESERVATIVE FREE 10 ML: 5 INJECTION INTRAVENOUS at 09:11

## 2023-08-23 RX ADMIN — PROPRANOLOL HYDROCHLORIDE 20 MG: 20 TABLET ORAL at 09:07

## 2023-08-23 NOTE — PROGRESS NOTES
Physical Therapy        Physical Therapy Cancel Note      DATE: 2023    NAME: Chris Arellano  MRN: [id number]   : 1940      Patient not seen this date for Physical Therapy due to:    Patient Declined: Pt educated on the importance of mobility pt stated she needed more rest time not today will check back tomorrow 23.       Electronically signed by Adelina Calvin PTA on 2023 at 1:36 PM

## 2023-08-23 NOTE — PROGRESS NOTES
[facility] - Progress Note    Today's Date and Time: 8/23/2023, 10:10 AM    Impression :   Altered mentation  Recent hx MRSA UTI on 7/27/23-Treated with Macrobid  Urinary retention  Hypokalemia  GERD  Anxiety  Parkinson's    Recommendations:   Monitor off antibiotics   D/C IV Vancomycin- No significant growth on urine culture from 8/10/23  Urology recommendations  Stable for D/C form ID perspective    Medical Decision Making/Summary/Discussion:8/23/2023       Infection Control Recommendations   [place] Precautions  Contact Precautions for MRSA hx    Antimicrobial Stewardship Recommendations     Discontinuation of therapy  Coordination of Outpatient Care:   Estimated Length of IV antimicrobials:NA  Patient will need Midline Catheter Insertion: No  Patient will need PICC line Insertion:No  Patient will need: Home IV , [address] [place],  SNF,  LTAC: No  Patient will need outpatient wound care: No    Chief complaint/reason for consultation:   MRSA UTI      History of Present Illness:   Eliud Turner is a 80y.o.-year-old  female who was initially admitted on 8/10/2023. Patient seen at the request of Dr. Jared Cosby:    This patient, who sees Dr. Levar Walls, with Urology, for urinary retention, and was treated for a MRSA UTI with Macrobid at the end of July 2023, presented to the ED from her nursing home with confusion x 24 hrs on 8/10/23. She has a history of anxiety that is treated with Klonopin. Her right pupil was noted to be dilated. A CT of her head was negative for any acute abnormalities. Labwork was mostly unremarkable other than hypokalemia and slightly elevated creatinine. The patient was given fluids and was administered a dose of IV Rocephin and was initiated on IV Vancomycin. Blood cultures have yielded no growth thus far and the urine culture shows no significant growth.      Urology was consulted and a CT abd/pelvis was condition->Emergency Medical Condition (MA) Reason for Exam: ams FINDINGS: BRAIN/VENTRICLES: There is no acute intracranial hemorrhage, mass effect or midline shift. No abnormal extra-axial fluid collection. The gray-white differentiation is maintained without evidence of an acute infarct. There is no evidence of hydrocephalus. Unchanged enlarged frontal dural spaces, mild cortical volume loss, and scattered low-attenuation white matter abnormalities. ORBITS: The visualized portion of the orbits demonstrate no acute abnormality. Status post right cataract surgery. SINUSES: The visualized paranasal sinuses and mastoid air cells demonstrate no acute abnormality. SOFT TISSUES/SKULL:  No acute abnormality of the visualized skull or soft tissues. Unchanged hyperostosis frontalis interna. No acute intracranial abnormality. Similar senescent findings, as above. XR CHEST PORTABLE    Result Date: 8/10/2023  EXAMINATION: ONE XRAY VIEW OF THE CHEST 8/10/2023 12:05 pm COMPARISON: 07/02/2023 HISTORY: ORDERING SYSTEM PROVIDED HISTORY: AMS TECHNOLOGIST PROVIDED HISTORY: AMS Reason for Exam: altered mental status, port upr. FINDINGS: Cardiomediastinal silhouette and pulmonary vasculature are within normal limits. No focal airspace consolidation, pneumothorax, or pleural effusion. No free air beneath the diaphragm. No acute osseous abnormality. No acute intrathoracic process. Medical Decision Qxllun-Hpjwfhmz-Widoz:     Results       Procedure Component Value Units Date/Time    Culture, Urine [[id number]] Collected: 08/10/23 1222    Order Status: Completed Specimen: Urine, straight catheter Updated: 08/11/23 1111     Specimen Description . URINE,STRAIGHT CATHETER     Culture NO SIGNIFICANT GROWTH    Blood Culture 1 [[id number]] Collected: 08/10/23 1109    Order Status: Completed Specimen: Blood Updated: 08/15/23 1146     Specimen Description . BLOOD     Special Requests RAC 10ML     Culture NO GROWTH 5 DAYS

## 2023-08-23 NOTE — PLAN OF CARE
Problem: Discharge Planning  Goal: Discharge to home or other facility with appropriate resources  8/23/2023 1808 by Umu Fritz RN  Outcome: Progressing  8/23/2023 0440 by Hemanth Shaw RN  Outcome: Progressing     Problem: Skin/Tissue Integrity  Goal: Absence of new skin breakdown  Description: 1. Monitor for areas of redness and/or skin breakdown  2. Assess vascular access sites hourly  3. Every 4-6 hours minimum:  Change oxygen saturation probe site  4. Every 4-6 hours:  If on nasal continuous positive airway pressure, respiratory therapy assess nares and determine need for appliance change or resting period. 8/23/2023 1808 by Umu Fritz RN  Outcome: Progressing  8/23/2023 0440 by Hemanth Shaw RN  Outcome: Progressing     Problem: Safety - Adult  Goal: Free from fall injury  8/23/2023 1808 by Umu Fritz RN  Outcome: Progressing  8/23/2023 0440 by Hemanth Shaw RN  Outcome: Progressing     Problem: ABCDS Injury Assessment  Goal: Absence of physical injury  8/23/2023 0440 by Hemanth Sahw RN  Outcome: Progressing     Problem: Confusion  Goal: Confusion, delirium, dementia, or psychosis is improved or at baseline  Description: INTERVENTIONS:  1. Assess for possible contributors to thought disturbance, including medications, impaired vision or hearing, underlying metabolic abnormalities, dehydration, psychiatric diagnoses, and notify attending LIP  2. [place] high risk fall precautions, as indicated  3. Provide frequent short contacts to provide reality reorientation, refocusing and direction  4. Decrease environmental stimuli, including noise as appropriate  5. Monitor and intervene to maintain adequate nutrition, hydration, elimination, sleep and activity  6. If unable to ensure safety without constant attention obtain sitter and review sitter guidelines with assigned personnel  7.  Initiate Psychosocial CNS and Spiritual Care consult, as indicated  8/23/2023 0440 by Oliver Gill, RN  Outcome: Progressing     Problem: Nutrition Deficit:  Goal: Optimize nutritional status  8/23/2023 0440 by Oliver Gill, RN  Outcome: Progressing     Problem: Pain  Goal: Verbalizes/displays adequate comfort level or baseline comfort level  8/23/2023 0440 by Oliver Gill, RN  Outcome: Progressing

## 2023-08-23 NOTE — PLAN OF CARE
Problem: Skin/Tissue Integrity  Goal: Absence of new skin breakdown  Description: 1. Monitor for areas of redness and/or skin breakdown  2. Assess vascular access sites hourly  3. Every 4-6 hours minimum:  Change oxygen saturation probe site  4. Every 4-6 hours:  If on nasal continuous positive airway pressure, respiratory therapy assess nares and determine need for appliance change or resting period. 8/23/2023 0440 by Oliver Gill RN  Outcome: Progressing  8/22/2023 1622 by Bre Yu RN  Outcome: Progressing     Problem: Safety - Adult  Goal: Free from fall injury  8/23/2023 0440 by Oliver Gill RN  Outcome: Progressing  8/22/2023 1622 by Bre Yu RN  Outcome: Progressing     Problem: Confusion  Goal: Confusion, delirium, dementia, or psychosis is improved or at baseline  Description: INTERVENTIONS:  1. Assess for possible contributors to thought disturbance, including medications, impaired vision or hearing, underlying metabolic abnormalities, dehydration, psychiatric diagnoses, and notify attending LIP  2. [place] high risk fall precautions, as indicated  3. Provide frequent short contacts to provide reality reorientation, refocusing and direction  4. Decrease environmental stimuli, including noise as appropriate  5. Monitor and intervene to maintain adequate nutrition, hydration, elimination, sleep and activity  6. If unable to ensure safety without constant attention obtain sitter and review sitter guidelines with assigned personnel  7. Initiate Psychosocial CNS and Spiritual Care consult, as indicated  Outcome: Progressing     Problem: ABCDS Injury Assessment  Goal: Absence of physical injury  Outcome: Progressing     Problem: Confusion  Goal: Confusion, delirium, dementia, or psychosis is improved or at baseline  Description: INTERVENTIONS:  1.  Assess for possible contributors to thought disturbance, including medications, impaired vision or hearing, underlying metabolic

## 2023-08-24 LAB
EKG ATRIAL RATE: 80 BPM
EKG P AXIS: 36 DEGREES
EKG P-R INTERVAL: 104 MS
EKG Q-T INTERVAL: 398 MS
EKG QRS DURATION: 74 MS
EKG QTC CALCULATION (BAZETT): 459 MS
EKG R AXIS: 64 DEGREES
EKG T AXIS: 29 DEGREES
EKG VENTRICULAR RATE: 80 BPM

## 2023-08-24 PROCEDURE — 51701 INSERT BLADDER CATHETER: CPT

## 2023-08-24 PROCEDURE — 6370000000 HC RX 637 (ALT 250 FOR IP): Performed by: INTERNAL MEDICINE

## 2023-08-24 PROCEDURE — 2580000003 HC RX 258

## 2023-08-24 PROCEDURE — 51702 INSERT TEMP BLADDER CATH: CPT

## 2023-08-24 PROCEDURE — 1200000000 HC SEMI PRIVATE

## 2023-08-24 PROCEDURE — 6370000000 HC RX 637 (ALT 250 FOR IP)

## 2023-08-24 PROCEDURE — 99232 SBSQ HOSP IP/OBS MODERATE 35: CPT | Performed by: INTERNAL MEDICINE

## 2023-08-24 PROCEDURE — 93010 ELECTROCARDIOGRAM REPORT: CPT | Performed by: INTERNAL MEDICINE

## 2023-08-24 PROCEDURE — 97116 GAIT TRAINING THERAPY: CPT

## 2023-08-24 PROCEDURE — 51798 US URINE CAPACITY MEASURE: CPT

## 2023-08-24 PROCEDURE — 6360000002 HC RX W HCPCS

## 2023-08-24 RX ADMIN — PROPRANOLOL HYDROCHLORIDE 20 MG: 20 TABLET ORAL at 08:45

## 2023-08-24 RX ADMIN — BUSPIRONE HYDROCHLORIDE 15 MG: 15 TABLET ORAL at 19:13

## 2023-08-24 RX ADMIN — DULOXETINE HYDROCHLORIDE 60 MG: 30 CAPSULE, DELAYED RELEASE ORAL at 19:12

## 2023-08-24 RX ADMIN — CLONAZEPAM 0.25 MG: 0.5 TABLET ORAL at 19:17

## 2023-08-24 RX ADMIN — STANDARDIZED SENNA CONCENTRATE AND DOCUSATE SODIUM 2 TABLET: 8.6; 5 TABLET ORAL at 08:52

## 2023-08-24 RX ADMIN — SODIUM CHLORIDE, PRESERVATIVE FREE 10 ML: 5 INJECTION INTRAVENOUS at 19:14

## 2023-08-24 RX ADMIN — ACETAMINOPHEN 650 MG: 325 TABLET ORAL at 12:33

## 2023-08-24 RX ADMIN — CLONAZEPAM 0.25 MG: 0.5 TABLET ORAL at 08:45

## 2023-08-24 RX ADMIN — PROPRANOLOL HYDROCHLORIDE 20 MG: 20 TABLET ORAL at 19:14

## 2023-08-24 RX ADMIN — QUETIAPINE FUMARATE 25 MG: 25 TABLET ORAL at 19:11

## 2023-08-24 RX ADMIN — HALOPERIDOL LACTATE 5 MG: 5 INJECTION, SOLUTION INTRAMUSCULAR at 19:15

## 2023-08-24 RX ADMIN — DULOXETINE HYDROCHLORIDE 60 MG: 30 CAPSULE, DELAYED RELEASE ORAL at 08:45

## 2023-08-24 RX ADMIN — BUSPIRONE HYDROCHLORIDE 15 MG: 15 TABLET ORAL at 08:45

## 2023-08-24 ASSESSMENT — ENCOUNTER SYMPTOMS
EYES NEGATIVE: 1
RESPIRATORY NEGATIVE: 1
GASTROINTESTINAL NEGATIVE: 1

## 2023-08-24 ASSESSMENT — PAIN DESCRIPTION - LOCATION: LOCATION: BACK

## 2023-08-24 ASSESSMENT — PAIN DESCRIPTION - DESCRIPTORS: DESCRIPTORS: DISCOMFORT

## 2023-08-24 ASSESSMENT — PAIN SCALES - GENERAL
PAINLEVEL_OUTOF10: 3
PAINLEVEL_OUTOF10: 0

## 2023-08-24 NOTE — PLAN OF CARE
Problem: Discharge Planning  Goal: Discharge to home or other facility with appropriate resources  8/23/2023 1808 by Kenji Lemus RN  Outcome: Progressing     Problem: Skin/Tissue Integrity  Goal: Absence of new skin breakdown  Description: 1. Monitor for areas of redness and/or skin breakdown  2. Assess vascular access sites hourly  3. Every 4-6 hours minimum:  Change oxygen saturation probe site  4. Every 4-6 hours:  If on nasal continuous positive airway pressure, respiratory therapy assess nares and determine need for appliance change or resting period. 8/24/2023 1453 by Lili Barrientos RN  Outcome: Progressing  8/23/2023 1808 by Kenji Lemus RN  Outcome: Progressing     Problem: Safety - Adult  Goal: Free from fall injury  8/24/2023 0337 by Lili Barrientos RN  Outcome: Progressing  8/23/2023 1808 by Kenji Lemus RN  Outcome: Progressing     Problem: Confusion  Goal: Confusion, delirium, dementia, or psychosis is improved or at baseline  Description: INTERVENTIONS:  1. Assess for possible contributors to thought disturbance, including medications, impaired vision or hearing, underlying metabolic abnormalities, dehydration, psychiatric diagnoses, and notify attending LIP  2. [place] high risk fall precautions, as indicated  3. Provide frequent short contacts to provide reality reorientation, refocusing and direction  4. Decrease environmental stimuli, including noise as appropriate  5. Monitor and intervene to maintain adequate nutrition, hydration, elimination, sleep and activity  6. If unable to ensure safety without constant attention obtain sitter and review sitter guidelines with assigned personnel  7.  Initiate Psychosocial CNS and Spiritual Care consult, as indicated  Outcome: Progressing

## 2023-08-24 NOTE — PLAN OF CARE
Problem: Discharge Planning  Goal: Discharge to home or other facility with appropriate resources  Outcome: Progressing     Problem: Skin/Tissue Integrity  Goal: Absence of new skin breakdown  Description: 1. Monitor for areas of redness and/or skin breakdown  2. Assess vascular access sites hourly  3. Every 4-6 hours minimum:  Change oxygen saturation probe site  4. Every 4-6 hours:  If on nasal continuous positive airway pressure, respiratory therapy assess nares and determine need for appliance change or resting period. 8/24/2023 1553 by Tank Vargas RN  Outcome: Progressing  8/24/2023 0337 by Nahid Weinstein RN  Outcome: Progressing     Problem: Safety - Adult  Goal: Free from fall injury  8/24/2023 1553 by Tank Vargas RN  Outcome: Progressing  8/24/2023 0337 by Nahid Weinstein RN  Outcome: Progressing     Problem: Confusion  Goal: Confusion, delirium, dementia, or psychosis is improved or at baseline  Description: INTERVENTIONS:  1. Assess for possible contributors to thought disturbance, including medications, impaired vision or hearing, underlying metabolic abnormalities, dehydration, psychiatric diagnoses, and notify attending LIP  2. [place] high risk fall precautions, as indicated  3. Provide frequent short contacts to provide reality reorientation, refocusing and direction  4. Decrease environmental stimuli, including noise as appropriate  5. Monitor and intervene to maintain adequate nutrition, hydration, elimination, sleep and activity  6. If unable to ensure safety without constant attention obtain sitter and review sitter guidelines with assigned personnel  7.  Initiate Psychosocial CNS and Spiritual Care consult, as indicated  8/24/2023 1050 by Nahid Weinstein RN  Outcome: Progressing

## 2023-08-24 NOTE — PROGRESS NOTES
[facility]  Internal Medicine Teaching Residency Program  Inpatient Daily Progress Note  ______________________________________________________________________________    Patient: Oseas Alvarado  YOB: 1940   MRS:[id number]    Acct: [de-identified]     Room: [address] [place] [address]  Admit date: 8/10/2023  Today's date: 08/24/23  Number of days in the hospital: 14    SUBJECTIVE   Admitting Diagnosis: Delirium  CC: Altered Mental status    Pt examined at bedside. Chart & results reviewed.   -No overnight acute events  -Patient is oriented to place and person  -Per nurse patient started eating food and started taking some oral fluids  -Patient had PVR s of 597 ml straight cath done and Croft catheter will be replaced  -Patient is planning to discharge for the facility once the placement is confirmed    Review of Systems   Constitutional:  Positive for appetite change. HENT: Negative. Eyes: Negative. Respiratory: Negative. Cardiovascular: Negative. Gastrointestinal: Negative. Endocrine: Negative. Genitourinary: Negative. Musculoskeletal: Negative. Neurological: Negative. Hematological: Negative. Psychiatric/Behavioral: Negative. BRIEF HISTORY     The patient is a 80 y.o. female with past medical history of generalized anxiety disorder, parkinsonism, colitis, acute urinary retention, OLIVA presented  with altered mental status. As per her son the patient is being disoriented for more than 24 hours in the nursing facility. As per the medical record she was admitted in the hospital on 7/2/2023 for the management of generalized anxiety disorder.   The patient had repeated episodes of anxiety and tachycardia throughout her hospital stay and she was managed with Ativan and Seroquel as needed, later it was changed to Klonopin by psychiatrist.  During her hospital stay she had a colitis episode for which she was treated with antibiotics and prolongation-resolved  -The QTc interval is normal  -Monitor K and Mg levels  -Maintain potassium >4    Severe malnutrition   -Dietitian on board recommended high kilocalories high-protein ONS twice daily  -Encourage p.o. intake    DVT ppx: Lovenox 40 Mg  GI ppx: Not indicated  Diet: No diet orders on file    PT/OT : Consulted  Discharge planning: Planning for discharge    Lora Clinton MD  Internal Medicine Resident, PGY-1  [place], [place].  8/24/2023, 8:38 AM

## 2023-08-24 NOTE — PROGRESS NOTES
Physical Therapy  Facility/Department: [facility] RENAL//MED SURG  Physical Therapy Daily Treatment Note    Name: Rosario Reina  : 1940  MRN: [id number]  Date of Service: 2023    Discharge Recommendations:  Patient would benefit from continued therapy after discharge   PT Equipment Recommendations  Equipment Needed: No  Other: pt unsafe to attempt any aspect of mobility without significant physical assistance      Patient Diagnosis(es): The primary encounter diagnosis was Septicemia ([address]). Diagnoses of Altered mental status, unspecified altered mental status type, Acute cystitis without hematuria, and OUSMANE (generalized anxiety disorder) were also pertinent to this visit. Past Medical History:  has a past medical history of OLIVA (acute kidney injury) ([address]), Anxiety and depression, Depression, GERD (gastroesophageal reflux disease), and Weight loss. Past Surgical History:  has a past surgical history that includes Spine surgery; Tonsillectomy; Colonoscopy (2011); eye surgery; Upper gastrointestinal endoscopy (2014); Colonoscopy (2014); Endoscopy, colon, diagnostic; Coccyx removal (1950); and Breast biopsy (Right, ). Assessment   Body Structures, Functions, Activity Limitations Requiring Skilled Therapeutic Intervention: Decreased functional mobility ; Increased pain;Decreased posture;Decreased strength;Decreased safe awareness;Decreased cognition;Decreased coordination;Decreased balance  Assessment: Pt ambulated 15ft x2 with RW and Eduardo, pt demonstrates significant cognitive deficits and would be unsafe to perform functional mobility unassisted. Recommending continued PT to address deficits and maximize safety and independence with mobility.   Therapy Prognosis: Good  Requires PT Follow-Up: Yes  Activity Tolerance  Activity Tolerance: Patient limited by fatigue;Treatment limited secondary to decreased cognition     Plan   Physcial Therapy Plan  General Plan:  (5-6x/week)  Current made;Decreased awareness of errors  Insights: Decreased awareness of deficits  Initiation: Requires cues for some  Sequencing: Requires cues for all     Objective   Bed mobility  Supine to Sit: Contact guard assistance  Sit to Supine: Contact guard assistance  Scooting: Contact guard assistance  Bed Mobility Comments: HOB elevated throughout, increased time/effort. Transfers  Sit to Stand: Minimal Assistance  Stand to Sit: Minimal Assistance  Comment: RW used for transfers, performed x1 from EOB. Ambulation  Surface: Level tile  Device: Rolling Walker  Assistance: Minimal assistance  Quality of Gait: stiff, short and choppy gait, decreased endurance, assistance required to navigate RW and to prevent posterior LOB  Gait Deviations: Slow Pascale; Shuffles;Decreased step length;Decreased step height;Deviated path  Distance: 15ft x2  Comments: Pt declined further ambulation. More Ambulation?: No  Stairs/Curb  Stairs?: No     Balance  Posture: Fair  Sitting - Static: Fair  Sitting - Dynamic: Fair;-  Standing - Static: Poor;+  Standing - Dynamic: Poor;+  Comments: standing balance assessed with RW, pt able to sit EOB CGA      AM-PAC Score  AM-PAC Inpatient Mobility Raw Score : 17 (08/24/23 1601)  AM-PAC Inpatient T-Scale Score : 42.13 (08/24/23 1601)  Mobility Inpatient CMS 0-100% Score: 50.57 (08/24/23 1601)  Mobility Inpatient CMS G-Code Modifier : CK (08/24/23 1601)      Goals  Short Term Goals  Time Frame for Short Term Goals: 14 visits  Short Term Goal 1: Supine to/from sit with SBA. Short Term Goal 2: Sit to/from stand with SBA. Short Term Goal 3: Ambulate 48' with walker with CGA.        Education  Patient Education  Education Given To: Patient  Education Provided: Role of Therapy;Plan of Care;Transfer Training;Precautions;Orientation  Education Method: Verbal  Barriers to Learning: Cognition  Education Outcome: Continued education needed      Therapy Time   Individual Concurrent Group Co-treatment   Time In

## 2023-08-24 NOTE — PROGRESS NOTES
[facility] - Progress Note    Today's Date and Time: 8/24/2023, 9:00 AM    Impression :   Altered mentation  Recent hx MRSA UTI on 7/27/23-Treated with Macrobid  Urinary retention  Hypokalemia  GERD  Anxiety  Parkinson's    Recommendations:   Monitor off antibiotics   D/C IV Vancomycin- No significant growth on urine culture from 8/10/23  Urology recommendations  Stable for D/C form ID perspective    Medical Decision Making/Summary/Discussion:8/24/2023       Infection Control Recommendations   [place] Precautions  Contact Precautions for MRSA hx    Antimicrobial Stewardship Recommendations     Discontinuation of therapy  Coordination of Outpatient Care:   Estimated Length of IV antimicrobials:NA  Patient will need Midline Catheter Insertion: No  Patient will need PICC line Insertion:No  Patient will need: Home IV , [address] [facility],  SNF,  LTAC: No  Patient will need outpatient wound care: No    Chief complaint/reason for consultation:   MRSA UTI      History of Present Illness:   Rosario Reed is a 80y.o.-year-old  female who was initially admitted on 8/10/2023. Patient seen at the request of Dr. Alondra Schmidt:    This patient, who sees Dr. Dudley Courtney, with Urology, for urinary retention, and was treated for a MRSA UTI with Macrobid at the end of July 2023, presented to the ED from her nursing home with confusion x 24 hrs on 8/10/23. She has a history of anxiety that is treated with Klonopin. Her right pupil was noted to be dilated. A CT of her head was negative for any acute abnormalities. Labwork was mostly unremarkable other than hypokalemia and slightly elevated creatinine. The patient was given fluids and was administered a dose of IV Rocephin and was initiated on IV Vancomycin. Blood cultures have yielded no growth thus far and the urine culture shows no significant growth.      Urology was consulted and a CT abd/pelvis was confirmed emergency medical condition->Emergency Medical Condition (MA) Reason for Exam: ams FINDINGS: BRAIN/VENTRICLES: There is no acute intracranial hemorrhage, mass effect or midline shift. No abnormal extra-axial fluid collection. The gray-white differentiation is maintained without evidence of an acute infarct. There is no evidence of hydrocephalus. Unchanged enlarged frontal dural spaces, mild cortical volume loss, and scattered low-attenuation white matter abnormalities. ORBITS: The visualized portion of the orbits demonstrate no acute abnormality. Status post right cataract surgery. SINUSES: The visualized paranasal sinuses and mastoid air cells demonstrate no acute abnormality. SOFT TISSUES/SKULL:  No acute abnormality of the visualized skull or soft tissues. Unchanged hyperostosis frontalis interna. No acute intracranial abnormality. Similar senescent findings, as above. XR CHEST PORTABLE    Result Date: 8/10/2023  EXAMINATION: ONE XRAY VIEW OF THE CHEST 8/10/2023 12:05 pm COMPARISON: 07/02/2023 HISTORY: ORDERING SYSTEM PROVIDED HISTORY: AMS TECHNOLOGIST PROVIDED HISTORY: AMS Reason for Exam: altered mental status, port upr. FINDINGS: Cardiomediastinal silhouette and pulmonary vasculature are within normal limits. No focal airspace consolidation, pneumothorax, or pleural effusion. No free air beneath the diaphragm. No acute osseous abnormality. No acute intrathoracic process. Medical Decision Bzypri-Wtjcmzxb-Wyesw:     Results       Procedure Component Value Units Date/Time    Culture, Urine [[id number]] Collected: 08/10/23 1222    Order Status: Completed Specimen: Urine, straight catheter Updated: 08/11/23 1111     Specimen Description . URINE,STRAIGHT CATHETER     Culture NO SIGNIFICANT GROWTH    Blood Culture 1 [[id number]] Collected: 08/10/23 1109    Order Status: Completed Specimen: Blood Updated: 08/15/23 1146     Specimen Description . BLOOD     Special Requests RAC 10ML Culture NO GROWTH 5 DAYS    Culture, Blood 2 [[id number]] Collected: 08/10/23 1109    Order Status: Completed Specimen: Blood Updated: 08/15/23 1146     Specimen Description . BLOOD     Special Requests LAC 10ML     Culture NO GROWTH 5 DAYS          Contains abnormal data Culture, Urine  Order: [id number]  Status: Final result     Visible to patient: Yes (not seen)     Next appt: 09/07/2023 at 09:15 AM in Family Medicine ([address], [place])     Dx: Dysuria     Specimen Information: Urine, straight catheter   2 Result Notes      Component    Specimen Description . URINE,STRAIGHT CATHETER    Culture METHICILLIN RESISTANT STAPHYLOCOCCUS AUREUS >100,000 CFU/ML Abnormal     Resulting [address]        Susceptibility    Methicillin-Resistant Staphylococcus aureus (1)    Antibiotic Interpretation Microscan Method Status    penicillin Resistant >=0.5 BACTERIAL SUSCEPTIBILITY PANEL SIDDHARTH Final    gentamicin Sensitive <=0.5 BACTERIAL SUSCEPTIBILITY PANEL SIDDHARTH Final     Gentamicin is used only in combination with other active agents that test susceptible. levofloxacin Intermediate 4 BACTERIAL SUSCEPTIBILITY PANEL SIDDHARTH Final    nitrofurantoin Sensitive <=16 BACTERIAL SUSCEPTIBILITY PANEL SIDDHARTH Final    oxacillin Resistant >=4 BACTERIAL SUSCEPTIBILITY PANEL SIDDHARTH Final    tetracycline Sensitive <=1 BACTERIAL SUSCEPTIBILITY PANEL SIDDHARTH Final    trimethoprim-sulfamethoxazole Sensitive <=10 BACTERIAL SUSCEPTIBILITY PANEL SIDDHARTH Final    vancomycin Sensitive 1 BACTERIAL SUSCEPTIBILITY PANEL SIDDHARTH Final                 Medical Decision Making-Other:     Note:    Thank you for allowing us to participate in the care of this patient. Please call with questions. Cody Tripp DPM          ATTESTATION:    I have discussed the case, including pertinent history and exam findings with the medical resident.  I have evaluated the  History, physical findings and pictures of the patient and the key elements of the encounter

## 2023-08-25 ENCOUNTER — APPOINTMENT (OUTPATIENT)
Dept: CT IMAGING | Age: 83
End: 2023-08-25
Payer: MEDICARE

## 2023-08-25 LAB — GLUCOSE BLD-MCNC: 97 MG/DL (ref 65–105)

## 2023-08-25 PROCEDURE — 82947 ASSAY GLUCOSE BLOOD QUANT: CPT

## 2023-08-25 PROCEDURE — 2580000003 HC RX 258

## 2023-08-25 PROCEDURE — 6370000000 HC RX 637 (ALT 250 FOR IP): Performed by: INTERNAL MEDICINE

## 2023-08-25 PROCEDURE — 99232 SBSQ HOSP IP/OBS MODERATE 35: CPT | Performed by: INTERNAL MEDICINE

## 2023-08-25 PROCEDURE — 70450 CT HEAD/BRAIN W/O DYE: CPT

## 2023-08-25 PROCEDURE — 1200000000 HC SEMI PRIVATE

## 2023-08-25 PROCEDURE — 6370000000 HC RX 637 (ALT 250 FOR IP)

## 2023-08-25 PROCEDURE — 99233 SBSQ HOSP IP/OBS HIGH 50: CPT | Performed by: INTERNAL MEDICINE

## 2023-08-25 PROCEDURE — 99232 SBSQ HOSP IP/OBS MODERATE 35: CPT | Performed by: PSYCHIATRY & NEUROLOGY

## 2023-08-25 RX ADMIN — BUSPIRONE HYDROCHLORIDE 15 MG: 15 TABLET ORAL at 19:36

## 2023-08-25 RX ADMIN — PROPRANOLOL HYDROCHLORIDE 20 MG: 20 TABLET ORAL at 08:24

## 2023-08-25 RX ADMIN — DULOXETINE HYDROCHLORIDE 60 MG: 30 CAPSULE, DELAYED RELEASE ORAL at 19:36

## 2023-08-25 RX ADMIN — BUSPIRONE HYDROCHLORIDE 15 MG: 15 TABLET ORAL at 08:24

## 2023-08-25 RX ADMIN — SODIUM CHLORIDE, PRESERVATIVE FREE 10 ML: 5 INJECTION INTRAVENOUS at 19:37

## 2023-08-25 RX ADMIN — SODIUM CHLORIDE, PRESERVATIVE FREE 10 ML: 5 INJECTION INTRAVENOUS at 08:25

## 2023-08-25 RX ADMIN — CLONAZEPAM 0.25 MG: 0.5 TABLET ORAL at 19:35

## 2023-08-25 RX ADMIN — CLONAZEPAM 0.25 MG: 0.5 TABLET ORAL at 08:23

## 2023-08-25 RX ADMIN — QUETIAPINE FUMARATE 25 MG: 25 TABLET ORAL at 19:36

## 2023-08-25 RX ADMIN — DULOXETINE HYDROCHLORIDE 60 MG: 30 CAPSULE, DELAYED RELEASE ORAL at 08:24

## 2023-08-25 ASSESSMENT — ENCOUNTER SYMPTOMS
COLOR CHANGE: 0
VOMITING: 0
EYE ITCHING: 0
NAUSEA: 0
COUGH: 0
DIARRHEA: 0
EYE DISCHARGE: 0
RHINORRHEA: 0

## 2023-08-25 ASSESSMENT — PAIN SCALES - GENERAL: PAINLEVEL_OUTOF10: 0

## 2023-08-25 NOTE — PROGRESS NOTES
[facility] - [address] [place] [address]  PROGRESS NOTE    Shift date: 8/25/2023   Shift day: Friday   Shift # 1    Room # 0315/0315-01   Name: Aurea Paz                [facility] [place]:     Referral:  Stroke alert    Admit Date & Time: 8/10/2023 10:58 AM    Assessment:  Aurea Paz is a 80 y.o. female. Doctor said he noticed a facial droop and called a stroke alert. Pt was awake and alert and talking with staff.  was unable to assess pt as she was taken to CT. Intervention:   provided a ministry of presence as patient was being assessed.  attempted to visit patient after CT but she was sleeping. Outcome:  No outcome to report. Plan:  Chaplains will remain available to offer spiritual and emotional support as needed.       Electronically signed by Linda Lindsay on 8/25/2023 at 11:00 [address] [email or web address]  [phone]        08/25/23 1001   Encounter Summary   Encounter Overview/Reason  Crisis   Service Provided For: Patient   Referral/Consult From: Multi-disciplinary team   Last Encounter  08/25/23   Complexity of Encounter Low   Begin Time 0930   End Time  0945   Total Time Calculated 15 min   Crisis   Type Code Stroke   Assessment/Intervention/Outcome   Assessment Unable to assess

## 2023-08-25 NOTE — PLAN OF CARE
Problem: Discharge Planning  Goal: Discharge to home or other facility with appropriate resources  8/25/2023 0020 by Leticia Lane RN  Outcome: Progressing  8/24/2023 1553 by Angeline Kim RN  Outcome: Progressing     Problem: Skin/Tissue Integrity  Goal: Absence of new skin breakdown  Description: 1. Monitor for areas of redness and/or skin breakdown  2. Assess vascular access sites hourly  3. Every 4-6 hours minimum:  Change oxygen saturation probe site  4. Every 4-6 hours:  If on nasal continuous positive airway pressure, respiratory therapy assess nares and determine need for appliance change or resting period. 8/25/2023 0020 by Leticia Lane RN  Outcome: Progressing  8/24/2023 1553 by Angeline Kim RN  Outcome: Progressing     Problem: Safety - Adult  Goal: Free from fall injury  8/25/2023 0020 by Leticia Lane RN  Outcome: Progressing  8/24/2023 1553 by Angeline Kim RN  Outcome: Progressing     Problem: ABCDS Injury Assessment  Goal: Absence of physical injury  Outcome: Progressing     Problem: Confusion  Goal: Confusion, delirium, dementia, or psychosis is improved or at baseline  Description: INTERVENTIONS:  1. Assess for possible contributors to thought disturbance, including medications, impaired vision or hearing, underlying metabolic abnormalities, dehydration, psychiatric diagnoses, and notify attending LIP  2. Fleming high risk fall precautions, as indicated  3. Provide frequent short contacts to provide reality reorientation, refocusing and direction  4. Decrease environmental stimuli, including noise as appropriate  5. Monitor and intervene to maintain adequate nutrition, hydration, elimination, sleep and activity  6. If unable to ensure safety without constant attention obtain sitter and review sitter guidelines with assigned personnel  7.  Initiate Psychosocial CNS and Spiritual Care consult, as indicated  Outcome: Progressing     Problem: Nutrition Deficit:  Goal: Optimize nutritional status  Outcome: Progressing     Problem: Pain  Goal: Verbalizes/displays adequate comfort level or baseline comfort level  Outcome: Progressing

## 2023-08-25 NOTE — PLAN OF CARE
Psychiatry recommends patient is not harm to herself, Can discontinue suicide precautions and sitter.

## 2023-08-25 NOTE — PROGRESS NOTES
Physical Therapy        Physical Therapy Cancel Note      DATE: 2023    NAME: Ashlee Martinez  MRN: [id number]   : 1940      Patient not seen this date for Physical Therapy due to:    Testing: Pt currently off the floor for CT scan following a stroke alert this morning. Will continue to check on pt as appropriate.       Electronically signed by Jessica Rey PTA on 2023 at 10:33 AM

## 2023-08-25 NOTE — PROGRESS NOTES
Comprehensive Nutrition Assessment    Type and Reason for Visit:  Reassess    Nutrition Recommendations/Plan:   Continue diet and ONS  Continue to recommend appetite stimulant, at this point patient may also require nutrition support and/or palliative care consult. Please obtain weight, order placed. Malnutrition Assessment:  Malnutrition Status:  Severe malnutrition (08/12/23 7757)    Context:  Chronic Illness     Findings of the 6 clinical characteristics of malnutrition:  Energy Intake:  75% or less estimated energy requirements for 1 month or longer  Weight Loss:  Greater than 20% over 1 year     Body Fat Loss:  Mild body fat loss Orbital   Muscle Mass Loss:  Mild muscle mass loss Hand (interosseous)  Fluid Accumulation:  No significant fluid accumulation     Strength:  Not Performed    Nutrition Assessment:    Patient seen for follow up. Per sitter, 0% breakfast consumed. Multiple (5?) unopened ensures at bedside. Order placed again for updated weight. Continue to recommend appetite stimulant, at this point patient likely needs nutrition support and/or palliative care consult. Labs/meds reviewed. Nutrition Related Findings:    labs/meds reviewed Wound Type: None       Current Nutrition Intake & Therapies:    Average Meal Intake: 0%, 1-25%  Average Supplements Intake: 0%  ADULT ORAL NUTRITION SUPPLEMENT; Lunch, Dinner; Standard High Calorie/High Protein Oral Supplement  ADULT DIET; Regular; Safety Tray; Safety Tray (Disposables)    Anthropometric Measures:  Height: 5' 4\" (162.6 cm)  Ideal Body Weight (IBW): 120 lbs (55 kg)    Admission Body Weight: 114 lb 13.8 oz (52.1 kg) (8/12)  Current Body Weight: 114 lb 13.8 oz (52.1 kg) (8/12), 95.7 % IBW.     Current BMI (kg/m2): 19.7  Usual Body Weight: 146 lb 6.4 oz (66.4 kg) (12/13/22)  % Weight Change (Calculated): -21.5  Weight Adjustment For: No Adjustment                 BMI Categories: Underweight (BMI less than 22) age over 72    Estimated Daily

## 2023-08-25 NOTE — CARE COORDINATION
Spoke with admissions at [facility], requested call back from Keturah for update on Precert  8:61 Sitter at bedside for suicidal remarks, await Psych eval  3:35 Psych eval complete, sitter can be removed ,suicide precautions removed. Spoke with Keturah update given .  Awaiting re precert

## 2023-08-25 NOTE — PROGRESS NOTES
[facility] - Progress Note    Today's Date and Time: 8/25/2023, 9:26 AM    Impression :   Altered mentation  Recent hx MRSA UTI on 7/27/23-Treated with Macrobid  Urinary retention  Hypokalemia  GERD  Anxiety  Parkinson's    Recommendations:   Monitor off antibiotics   D/C IV Vancomycin- No significant growth on urine culture from 8/10/23  Urology recommendations  Stable for D/C from ID perspective    Medical Decision Making/Summary/Discussion:8/25/2023       Infection Control Recommendations   [place] Precautions  Contact Precautions for MRSA hx    Antimicrobial Stewardship Recommendations     Discontinuation of therapy  Coordination of Outpatient Care:   Estimated Length of IV antimicrobials:NA  Patient will need Midline Catheter Insertion: No  Patient will need PICC line Insertion:No  Patient will need: Home IV , [address] [facility],  SNF,  LTAC: No  Patient will need outpatient wound care: No    Chief complaint/reason for consultation:   MRSA UTI      History of Present Illness:   Nubia Eisenberg is a 80y.o.-year-old  female who was initially admitted on 8/10/2023. Patient seen at the request of Dr. Yessy Foss:    This patient, who sees Dr. Bandar Blanco, with Urology, for urinary retention, and was treated for a MRSA UTI with Macrobid at the end of July 2023, presented to the ED from her nursing home with confusion x 24 hrs on 8/10/23. She has a history of anxiety that is treated with Klonopin. Her right pupil was noted to be dilated. A CT of her head was negative for any acute abnormalities. Labwork was mostly unremarkable other than hypokalemia and slightly elevated creatinine. The patient was given fluids and was administered a dose of IV Rocephin and was initiated on IV Vancomycin. Blood cultures have yielded no growth thus far and the urine culture shows no significant growth.      Urology was consulted and a CT abd/pelvis was organomegaly  All four Extremities: No cyanosis, clubbing, edema, or effusions. Neurologic: No gross sensory or motor deficits. Loretta Rios altered mentation  Skin: Warm and dry with good turgor. No signs of peripheral arterial or venous insufficiency. No ulcerations. No open wounds. Medical Decision Making -Laboratory:   I have independently reviewed/ordered the following labs:    CBC with Differential:   No results for input(s): WBC, HGB, HCT, PLT, SEGSPCT, BANDSPCT, LYMPHOPCT, MONOPCT, EOSPCT in the last 72 hours. BMP:   No results for input(s): NA, K, CL, CO2, BUN, CREATININE, CA, MG in the last 72 hours. Hepatic Function Panel:   No results for input(s): PROT, LABALBU, BILIDIR, IBILI, BILITOT, ALKPHOS, ALT, AST in the last 72 hours. No results for input(s): RPR in the last 72 hours. No results for input(s): HIV in the last 72 hours. No results for input(s): BC in the last 72 hours. Lab Results   Component Value Date/Time    MUCUS 1+ 08/10/2023 11:15 AM    RBC 3.20 08/21/2023 03:04 AM    TRICHOMONAS NOT REPORTED 09/12/2015 04:48 PM    WBC 6.2 08/21/2023 03:04 AM    YEAST NOT REPORTED 09/12/2015 04:48 PM    TURBIDITY Clear 08/10/2023 11:15 AM     Lab Results   Component Value Date/Time    CREATININE 0.7 08/21/2023 03:04 AM    GLUCOSE 87 08/21/2023 03:04 AM       Medical Decision Making-Imaging:   CT HEAD WO CONTRAST    Result Date: 8/10/2023  EXAMINATION: CT OF THE HEAD WITHOUT CONTRAST  8/10/2023 11:35 am TECHNIQUE: CT of the head was performed without the administration of intravenous contrast. Automated exposure control, iterative reconstruction, and/or weight based adjustment of the mA/kV was utilized to reduce the radiation dose to as low as reasonably achievable. COMPARISON: None.  HISTORY: ORDERING SYSTEM PROVIDED HISTORY: AMS TECHNOLOGIST PROVIDED HISTORY: AMS Decision Support Exception - unselect if not a suspected or confirmed emergency medical condition->Emergency Medical Condition (MA) Reason

## 2023-08-25 NOTE — CONSULTS
Department of Psychiatry  Consult Service  Progress Note     Reason for Consult: Suicidal ideation    SUBJECTIVE:    Psychiatry was reconsulted day after patient made some suicidal comments last night. Staff reported that patient reported that she wanted to die and had a plan and intent last night. Patient had no recollection of making the statements. Mentions that she last remembers getting to bed and has no recollection of any statements made. Currently denies any active suicidal ideation plan or intent. Was very pleasant when approached.     OBJECTIVE      Medications  Current Facility-Administered Medications: clonazePAM (KLONOPIN) tablet 0.25 mg, 0.25 mg, Oral, BID  propranolol (INDERAL) tablet 20 mg, 20 mg, Oral, BID  labetalol (NORMODYNE;TRANDATE) injection 10 mg, 10 mg, IntraVENous, Q4H PRN  glucose chewable tablet 16 g, 4 tablet, Oral, PRN  dextrose bolus 10% 125 mL, 125 mL, IntraVENous, PRN **OR** dextrose bolus 10% 250 mL, 250 mL, IntraVENous, PRN  glucagon (rDNA) injection 1 mg, 1 mg, SubCUTAneous, PRN  dextrose 10 % infusion, , IntraVENous, Continuous PRN  atorvastatin (LIPITOR) tablet 20 mg, 20 mg, Oral, Daily  sennosides-docusate sodium (SENOKOT-S) 8.6-50 MG tablet 2 tablet, 2 tablet, Oral, Daily  busPIRone (BUSPAR) tablet 15 mg, 15 mg, Oral, TID  DULoxetine (CYMBALTA) extended release capsule 60 mg, 60 mg, Oral, BID  haloperidol lactate (HALDOL) injection 5 mg, 5 mg, IntraMUSCular, Q6H PRN  sodium chloride flush 0.9 % injection 5-40 mL, 5-40 mL, IntraVENous, 2 times per day  sodium chloride flush 0.9 % injection 5-40 mL, 5-40 mL, IntraVENous, PRN  0.9 % sodium chloride infusion, , IntraVENous, PRN  enoxaparin (LOVENOX) injection 40 mg, 40 mg, SubCUTAneous, Daily  ondansetron (ZOFRAN-ODT) disintegrating tablet 4 mg, 4 mg, Oral, Q8H PRN **OR** ondansetron (ZOFRAN) injection 4 mg, 4 mg, IntraVENous, Q6H PRN  polyethylene glycol (GLYCOLAX) packet 17 g, 17 g, Oral, Daily PRN  acetaminophen (TYLENOL)

## 2023-08-25 NOTE — PROGRESS NOTES
[facility]  Internal Medicine Teaching Residency Program  Inpatient Daily Progress Note  ______________________________________________________________________________    Patient: Chandra Gorman  YOB: 1940   ODQ:[id number]    Acct: [de-identified]     Room: [address] [place] [address]-  Admit date: 8/10/2023  Today's date: 08/25/23  Number of days in the hospital: 15    SUBJECTIVE   Admitting Diagnosis: Delirium  CC: Altered mental status    -Pt seen and examined at bedside. Chart & results reviewed. -Was lying comfortably in the bed in no apparent distress  -Patient was afebrile and hemodynamically stable  -According to the RN, patient was suicidal last night. She had a butter knife and she told that she had a plan  -Patient has a sitter now for watching  -Psychiatry has been consulted to evaluate the current status of her mentation.  -The decision to discharge depends on the psychiatric evaluation and recommendation          Review of Systems   Constitutional:  Negative for activity change and fatigue. HENT:  Negative for rhinorrhea and sneezing. Eyes:  Negative for discharge and itching. Respiratory:  Negative for cough. Cardiovascular:  Negative for chest pain. Gastrointestinal:  Negative for diarrhea, nausea and vomiting. Genitourinary:  Negative for dysuria, frequency and urgency. Skin:  Negative for color change. Neurological:  Negative for headaches. Psychiatric/Behavioral:  Positive for suicidal ideas. BRIEF HISTORY     The patient is a 80 y.o. female with past medical history of generalized anxiety disorder, parkinsonism, colitis, acute urinary retention, OLIVA presented  with altered mental status. As per her son the patient is being disoriented for more than 24 hours in the nursing facility. As per the medical record she was admitted in the hospital on 7/2/2023 for the management of generalized anxiety disorder.   The patient had No oropharyngeal exudate. Eyes:      Extraocular Movements: Extraocular movements intact. Conjunctiva/sclera: Conjunctivae normal.   Skin:     General: Skin is warm. Coloration: Skin is not pale. Neurological:      General: No focal deficit present. Mental Status: Mental status is at baseline. Psychiatric:         Behavior: Behavior normal.         Medications:  Scheduled Medications:    clonazePAM  0.25 mg Oral BID    propranolol  20 mg Oral BID    atorvastatin  20 mg Oral Daily    sennosides-docusate sodium  2 tablet Oral Daily    busPIRone  15 mg Oral TID    DULoxetine  60 mg Oral BID    sodium chloride flush  5-40 mL IntraVENous 2 times per day    enoxaparin  40 mg SubCUTAneous Daily    QUEtiapine  25 mg Oral Nightly     Continuous Infusions:    dextrose      sodium chloride 10 mL/hr at 08/12/23 1458     PRN Medicationslabetalol, 10 mg, Q4H PRN  glucose, 4 tablet, PRN  dextrose bolus, 125 mL, PRN   Or  dextrose bolus, 250 mL, PRN  glucagon (rDNA), 1 mg, PRN  dextrose, , Continuous PRN  haloperidol lactate, 5 mg, Q6H PRN  sodium chloride flush, 5-40 mL, PRN  sodium chloride, , PRN  ondansetron, 4 mg, Q8H PRN   Or  ondansetron, 4 mg, Q6H PRN  polyethylene glycol, 17 g, Daily PRN  acetaminophen, 650 mg, Q6H PRN   Or  acetaminophen, 650 mg, Q6H PRN        Diagnostic Labs:  CBC: No results for input(s): WBC, RBC, HGB, HCT, MCV, RDW, PLT in the last 72 hours. BMP: No results for input(s): NA, K, CL, CO2, PHOS, BUN, CREATININE, CA in the last 72 hours. BNP: No results for input(s): BNP in the last 72 hours. PT/INR: No results for input(s): PROTIME, INR in the last 72 hours. APTT: No results for input(s): APTT in the last 72 hours. CARDIAC ENZYMES: No results for input(s): CKMB, CKMBINDEX, TROPONINI in the last 72 hours.     Invalid input(s): CKTOTAL;3  FASTING LIPID PANEL:  Lab Results   Component Value Date    CHOL 150 12/22/2022    HDL 57 12/22/2022    TRIG 64 12/22/2022     LIVER PROFILE: No

## 2023-08-26 PROCEDURE — 1200000000 HC SEMI PRIVATE

## 2023-08-26 PROCEDURE — 2580000003 HC RX 258

## 2023-08-26 PROCEDURE — 99232 SBSQ HOSP IP/OBS MODERATE 35: CPT | Performed by: INTERNAL MEDICINE

## 2023-08-26 PROCEDURE — 97535 SELF CARE MNGMENT TRAINING: CPT

## 2023-08-26 PROCEDURE — 6370000000 HC RX 637 (ALT 250 FOR IP)

## 2023-08-26 PROCEDURE — 6370000000 HC RX 637 (ALT 250 FOR IP): Performed by: INTERNAL MEDICINE

## 2023-08-26 RX ADMIN — PROPRANOLOL HYDROCHLORIDE 20 MG: 20 TABLET ORAL at 08:56

## 2023-08-26 RX ADMIN — CLONAZEPAM 0.25 MG: 0.5 TABLET ORAL at 08:56

## 2023-08-26 RX ADMIN — BUSPIRONE HYDROCHLORIDE 15 MG: 15 TABLET ORAL at 15:14

## 2023-08-26 RX ADMIN — ATORVASTATIN CALCIUM 20 MG: 20 TABLET, FILM COATED ORAL at 08:56

## 2023-08-26 RX ADMIN — BUSPIRONE HYDROCHLORIDE 15 MG: 15 TABLET ORAL at 08:56

## 2023-08-26 RX ADMIN — DULOXETINE HYDROCHLORIDE 60 MG: 30 CAPSULE, DELAYED RELEASE ORAL at 08:56

## 2023-08-26 RX ADMIN — STANDARDIZED SENNA CONCENTRATE AND DOCUSATE SODIUM 2 TABLET: 8.6; 5 TABLET ORAL at 08:56

## 2023-08-26 RX ADMIN — SODIUM CHLORIDE, PRESERVATIVE FREE 10 ML: 5 INJECTION INTRAVENOUS at 08:56

## 2023-08-26 ASSESSMENT — ENCOUNTER SYMPTOMS
EYES NEGATIVE: 1
APNEA: 0
EYE DISCHARGE: 0
ABDOMINAL DISTENTION: 0
COLOR CHANGE: 0
RESPIRATORY NEGATIVE: 1
GASTROINTESTINAL NEGATIVE: 1

## 2023-08-26 ASSESSMENT — PAIN SCALES - GENERAL: PAINLEVEL_OUTOF10: 0

## 2023-08-26 NOTE — PLAN OF CARE
Problem: Discharge Planning  Goal: Discharge to home or other facility with appropriate resources  8/26/2023 0627 by Michelet Silva RN  Outcome: Progressing     Problem: Skin/Tissue Integrity  Goal: Absence of new skin breakdown  Description: 1. Monitor for areas of redness and/or skin breakdown  2. Assess vascular access sites hourly  3. Every 4-6 hours minimum:  Change oxygen saturation probe site  4. Every 4-6 hours:  If on nasal continuous positive airway pressure, respiratory therapy assess nares and determine need for appliance change or resting period. 8/26/2023 3370 by Michelet Silva RN  Outcome: Progressing     Problem: Safety - Adult  Goal: Free from fall injury  8/26/2023 0627 by Michelet Silva RN  Outcome: Progressing     Problem: ABCDS Injury Assessment  Goal: Absence of physical injury  8/26/2023 9802 by Michelet Silva RN  Outcome: Progressing     Problem: Confusion  Goal: Confusion, delirium, dementia, or psychosis is improved or at baseline  Description: INTERVENTIONS:  1. Assess for possible contributors to thought disturbance, including medications, impaired vision or hearing, underlying metabolic abnormalities, dehydration, psychiatric diagnoses, and notify attending LIP  2. [place] high risk fall precautions, as indicated  3. Provide frequent short contacts to provide reality reorientation, refocusing and direction  4. Decrease environmental stimuli, including noise as appropriate  5. Monitor and intervene to maintain adequate nutrition, hydration, elimination, sleep and activity  6. If unable to ensure safety without constant attention obtain sitter and review sitter guidelines with assigned personnel  7.  Initiate Psychosocial CNS and Spiritual Care consult, as indicated  Outcome: Progressing     Problem: Nutrition Deficit:  Goal: Optimize nutritional status  Outcome: Progressing     Problem: Pain  Goal: Verbalizes/displays adequate comfort level or baseline comfort level  Outcome:

## 2023-08-26 NOTE — PROGRESS NOTES
in place  Restraints  Restraints Initially in Place: No  Balance  Sitting: Without support (Pt tolerated approx 25 min static/dynamic sitting supported/unsupported at SBA)  Standing: With support (Min A static/dynamic standing with RW tolerated approx 4 min requiring 3 seated rest breaks. V/c for proper foot placement to increase standing balance)  Gait  Overall Level of Assistance: Moderate assistance (EOB><bathroom utilizing RW requiring Min A for balance maintence and Mod-Max A for walker management to navigate and manuever in room)  Toilet Transfers  Toilet - Technique: Ambulating  Equipment Used: Standard bedside commode  Toilet Transfer: Minimal assistance  Toilet Transfers Comments: utilizing RW with cues for hand placement     ADL  Feeding: Minimal assistance  Feeding Skilled Clinical Factors: to open containers and manipulate small utensils  Grooming: Minimal assistance;Setup;Verbal cueing; Increased time to complete  Grooming Skilled Clinical Factors: face washing, oral care and hair brushing facilitated seated/standing at sink. Face washing facilitated seated at sink d/t fatigue. Oral care and hair brushing facilitated seated/standng with RW requiring 2-3 seated rest breaks d/t fatigue  UE Dressing: Minimal assistance;Setup;Verbal cueing  UE Dressing Skilled Clinical Factors: to doff/don gown and back gown as simulated jacket seated at EOB  LE Dressing: Maximum assistance;Setup;Verbal cueing; Increased time to complete  LE Dressing Skilled Clinical Factors: To doff/don socks seated at EOB. Education/demonstration facilitated on 4 figure dressing technique pt demo P understanding with P hip flexion requiring DELCID assist  Toileting: Maximum assistance;Setup  Toileting Skilled Clinical Factors: Toileting facilitated seated/standing on [facility] d/t urgency requiring assist with personal hygiene  Additional Comments: Pt agreeable to ADL tasks.  Requiring increased time with increased verbal/visual/tactile cues for sequencing and inititation. Bed mobility  Supine to Sit: Stand by assistance  Sit to Supine: Minimal assistance (assist for BLE)  Scooting: Minimal assistance  Bed Mobility Comments: HOB elevated  Transfers  Sit to stand: Moderate assistance  Stand to sit: Moderate assistance  Transfer Comments: utilizing RW requiring assist for hand placement and max verbal/tactle cues for proper hand/foot placement     Cognition  Overall Cognitive Status: Exceptions  Arousal/Alertness: Appropriate responses to stimuli  Following Commands: Follows multistep commands with repitition; Follows multistep commands with increased time; Inconsistently follows commands  Attention Span: Attends with cues to redirect; Difficulty attending to directions  Safety Judgement: Decreased awareness of need for assistance;Decreased awareness of need for safety  Problem Solving: Decreased awareness of errors;Assistance required to identify errors made;Assistance required to correct errors made  Insights: Decreased awareness of deficits  Initiation: Requires cues for some  Sequencing: Requires cues for some  Cognition Comment: impuslive demo P safety awareness  Orientation  Overall Orientation Status: Impaired  Orientation Level: Oriented to place;Oriented to time;Oriented to person;Disoriented to situation                  Education Given To: Patient  Education Provided: Role of Therapy;Transfer Training;ADL Adaptive Strategies;Precautions  Education Provided Comments: proper hand and foot placement; balance maintence, walker management; adaptive dressing; safety precautions-F/P carry over  Education Method: Verbal;Demonstration  Barriers to Learning: Cognition  Education Outcome: Continued education needed; Unable to demonstrate understanding                 AM-PAC Score        AM-[facility] Inpatient Daily Activity Raw Score: 15 (08/26/23 0914)  AM-PAC Inpatient ADL T-Scale Score : 34.69 (08/26/23 0914)  ADL Inpatient CMS 0-100% Score: 56.46 (08/26/23

## 2023-08-26 NOTE — CARE COORDINATION
Call to [facility] rehab to verify if they have auth for admission, ML for Franky Landon for return call regarding status of precert    [phone] Return call from [place] at [facility] no precert as of this time obtained

## 2023-08-26 NOTE — PROGRESS NOTES
[facility]  Internal Medicine Teaching Residency Program  Inpatient Daily Progress Note  ______________________________________________________________________________    Patient: Aminata El  YOB: 1940   EY    Acct: [de-identified]     Room: [address] [place] [address]  Admit date: 8/10/2023  Today's date: 23  Number of days in the hospital: 16    SUBJECTIVE   Admitting Diagnosis: Delirium  CC: Altered Mental status    Pt examined at bedside. Chart & results reviewed.   -No overnight acute events  -Patient is oriented to place and person  -Pt has foleys catheter in  -Patient had suicidal ideations 2 days back for which psychaitry consulted and a sitter was provided   - Currently she denies of suicidal ideation  - Psychiatry recommends discontinue the sitter and continue same medications      Review of Systems   Constitutional:  Positive for appetite change. HENT: Negative. Eyes: Negative. Respiratory: Negative. Cardiovascular: Negative. Gastrointestinal: Negative. Endocrine: Negative. Genitourinary: Negative. Musculoskeletal: Negative. Neurological: Negative. Hematological: Negative. Psychiatric/Behavioral: Negative. BRIEF HISTORY     The patient is a 80 y.o. female with past medical history of generalized anxiety disorder, parkinsonism, colitis, acute urinary retention, OLIVA presented  with altered mental status. As per her son the patient is being disoriented for more than 24 hours in the nursing facility. As per the medical record she was admitted in the hospital on 2023 for the management of generalized anxiety disorder.   The patient had repeated episodes of anxiety and tachycardia throughout her hospital stay and she was managed with Ativan and Seroquel as needed, later it was changed to Klonopin by psychiatrist.  During her hospital stay she had a colitis episode for which she was treated with the left side of the colon have improved. 2.  Moderate stool burden, suggesting constipation. 3.  No obstructive uropathy or urolithiasis. 4.  Single focus of gas noted in the urinary bladder, likely related to recent instrumentation. CT HEAD WO CONTRAST    Result Date: 8/10/2023  No acute intracranial abnormality. Similar senescent findings, as above. XR CHEST PORTABLE    Result Date: 8/10/2023  No acute intrathoracic process. ASSESSMENT & PLAN     ASSESSMENT / PLAN:       IMPRESSION  This is a 80 y.o. female who presented with altered mental status and found to have abnormal UA. Principal Problem:   Acute Delirium  Active Problems:    Complicated UTI-resolved    OLIVA    H/O Drug-induced parkinsonism    Urinary retention     QTc prolongation    Severe malnutrition    Acute Delirium   - Probable secondary to dementia   -Klonopin reduced to  0.25 mg   -Avoid Benzodiazepines  -Seroquel can be utilized as an emergency medication if there is increased agitation. 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     OLIVA - Resolved  -Creatinine down to 0.9 from 1.2 with eGFR > 60    OUSMANE  -Taking following medications : Seroquel 25 mg BD,  Klonopin 0.5 mg BD cut down to 0.25 BD if patient continues to have fluctuating cognition, duloxetine 60 mg extended release, buspirone 15 mg  -Patient's ammonia, lactate, WBC count are within normal limits  -Continue propranolol  -Avoid benzodiazepines    Urinary retention  - Urology on board  -Croft's catheter is on  -Avoid anticholinergics    H/O Drug induced Parkinsonism  -Resolved on propranolol     QTc prolongation-resolved  -The QTc interval is normal  -Monitor K and Mg levels  -Maintain potassium >4    Severe malnutrition   -Dietitian on board recommended high kilocalories high-protein ONS twice daily  -Encourage p.o. intake    DVT ppx: Lovenox 40 Mg  GI ppx: Not indicated  Diet: No diet orders on file    PT/OT : Consulted  Discharge planning: Planning for discharge    Shanice Lara MD  Internal Medicine Resident, PGY-1  [place], [place].  8/26/2023, 7:42 AM

## 2023-08-27 PROCEDURE — 6370000000 HC RX 637 (ALT 250 FOR IP)

## 2023-08-27 PROCEDURE — 6370000000 HC RX 637 (ALT 250 FOR IP): Performed by: INTERNAL MEDICINE

## 2023-08-27 PROCEDURE — 99232 SBSQ HOSP IP/OBS MODERATE 35: CPT | Performed by: INTERNAL MEDICINE

## 2023-08-27 PROCEDURE — 1200000000 HC SEMI PRIVATE

## 2023-08-27 PROCEDURE — 6360000002 HC RX W HCPCS

## 2023-08-27 RX ADMIN — ATORVASTATIN CALCIUM 20 MG: 20 TABLET, FILM COATED ORAL at 08:17

## 2023-08-27 RX ADMIN — BUSPIRONE HYDROCHLORIDE 15 MG: 15 TABLET ORAL at 08:17

## 2023-08-27 RX ADMIN — STANDARDIZED SENNA CONCENTRATE AND DOCUSATE SODIUM 2 TABLET: 8.6; 5 TABLET ORAL at 08:17

## 2023-08-27 RX ADMIN — DULOXETINE HYDROCHLORIDE 60 MG: 30 CAPSULE, DELAYED RELEASE ORAL at 08:17

## 2023-08-27 RX ADMIN — QUETIAPINE FUMARATE 25 MG: 25 TABLET ORAL at 19:54

## 2023-08-27 RX ADMIN — PROPRANOLOL HYDROCHLORIDE 20 MG: 20 TABLET ORAL at 19:54

## 2023-08-27 RX ADMIN — PROPRANOLOL HYDROCHLORIDE 20 MG: 20 TABLET ORAL at 08:18

## 2023-08-27 RX ADMIN — DULOXETINE HYDROCHLORIDE 60 MG: 30 CAPSULE, DELAYED RELEASE ORAL at 19:54

## 2023-08-27 RX ADMIN — QUETIAPINE FUMARATE 25 MG: 25 TABLET ORAL at 00:54

## 2023-08-27 RX ADMIN — ENOXAPARIN SODIUM 40 MG: 100 INJECTION SUBCUTANEOUS at 08:17

## 2023-08-27 RX ADMIN — CLONAZEPAM 0.25 MG: 0.5 TABLET ORAL at 08:17

## 2023-08-27 RX ADMIN — BUSPIRONE HYDROCHLORIDE 15 MG: 15 TABLET ORAL at 14:35

## 2023-08-27 RX ADMIN — BUSPIRONE HYDROCHLORIDE 15 MG: 15 TABLET ORAL at 19:54

## 2023-08-27 RX ADMIN — CLONAZEPAM 0.25 MG: 0.5 TABLET ORAL at 19:59

## 2023-08-27 ASSESSMENT — ENCOUNTER SYMPTOMS
EYES NEGATIVE: 1
RESPIRATORY NEGATIVE: 1
GASTROINTESTINAL NEGATIVE: 1

## 2023-08-27 ASSESSMENT — PAIN SCALES - GENERAL: PAINLEVEL_OUTOF10: 0

## 2023-08-27 NOTE — PROGRESS NOTES
[facility]  Internal Medicine Teaching Residency Program  Inpatient Daily Progress Note  ______________________________________________________________________________    Patient: Jose Carlson  YOB: 1940   WVU:[id number]    Acct: [de-identified]     Room: [address] [place] [address]  Admit date: 8/10/2023  Today's date: 08/27/23  Number of days in the hospital: 17    SUBJECTIVE   Admitting Diagnosis: Delirium  CC: Altered Mental status    Pt examined at bedside. Chart & results reviewed.   -No overnight acute events  -Patient is oriented to place and person  -Pt has foleys catheter in   - Currently she denies of suicidal ideation  - Psychiatry recommends discontinue the sitter and continue same medications    Review of Systems   Constitutional:  Positive for appetite change. HENT: Negative. Eyes: Negative. Respiratory: Negative. Cardiovascular: Negative. Gastrointestinal: Negative. Endocrine: Negative. Genitourinary: Negative. Musculoskeletal: Negative. Neurological: Negative. Hematological: Negative. Psychiatric/Behavioral: Negative. BRIEF HISTORY     The patient is a 80 y.o. female with past medical history of generalized anxiety disorder, parkinsonism, colitis, acute urinary retention, OLIVA presented  with altered mental status. As per her son the patient is being disoriented for more than 24 hours in the nursing facility. As per the medical record she was admitted in the hospital on 7/2/2023 for the management of generalized anxiety disorder.   The patient had repeated episodes of anxiety and tachycardia throughout her hospital stay and she was managed with Ativan and Seroquel as needed, later it was changed to Klonopin by psychiatrist.  During her hospital stay she had a colitis episode for which she was treated with antibiotics and was catheterized with the Croft for acute urinary retention and she was asked to follow-up Daily    sennosides-docusate sodium  2 tablet Oral Daily    busPIRone  15 mg Oral TID    DULoxetine  60 mg Oral BID    sodium chloride flush  5-40 mL IntraVENous 2 times per day    enoxaparin  40 mg SubCUTAneous Daily    QUEtiapine  25 mg Oral Nightly     Continuous Infusions:    dextrose      sodium chloride 10 mL/hr at 08/12/23 1458     PRN Medicationslabetalol, 10 mg, Q4H PRN  glucose, 4 tablet, PRN  dextrose bolus, 125 mL, PRN   Or  dextrose bolus, 250 mL, PRN  glucagon (rDNA), 1 mg, PRN  dextrose, , Continuous PRN  haloperidol lactate, 5 mg, Q6H PRN  sodium chloride flush, 5-40 mL, PRN  sodium chloride, , PRN  ondansetron, 4 mg, Q8H PRN   Or  ondansetron, 4 mg, Q6H PRN  polyethylene glycol, 17 g, Daily PRN  acetaminophen, 650 mg, Q6H PRN   Or  acetaminophen, 650 mg, Q6H PRN        Diagnostic Labs:  CBC:   No results for input(s): WBC, RBC, HGB, HCT, MCV, RDW, PLT in the last 72 hours. BMP:   No results for input(s): NA, K, CL, CO2, PHOS, BUN, CREATININE, CA in the last 72 hours. BNP: No results for input(s): BNP in the last 72 hours. PT/INR: No results for input(s): PROTIME, INR in the last 72 hours. APTT: No results for input(s): APTT in the last 72 hours. CARDIAC ENZYMES: No results for input(s): CKMB, CKMBINDEX, TROPONINI in the last 72 hours. Invalid input(s): CKTOTAL;3  FASTING LIPID PANEL:  Lab Results   Component Value Date    CHOL 150 12/22/2022    HDL 57 12/22/2022    TRIG 64 12/22/2022     LIVER PROFILE:   No results for input(s): AST, ALT, ALB, BILIDIR, BILITOT, ALKPHOS in the last 72 hours. MICROBIOLOGY:   Lab Results   Component Value Date/Time    CULTURE NO SIGNIFICANT GROWTH 08/10/2023 12:22 PM       Imaging:    CT ABDOMEN PELVIS WO CONTRAST Additional Contrast? None    Result Date: 8/11/2023  1. No acute process in the abdomen or pelvis. Previously noted findings of colitis at the left side of the colon have improved. 2.  Moderate stool burden, suggesting constipation.  3.  No obstructive uropathy or urolithiasis. 4.  Single focus of gas noted in the urinary bladder, likely related to recent instrumentation. CT HEAD WO CONTRAST    Result Date: 8/10/2023  No acute intracranial abnormality. Similar senescent findings, as above. XR CHEST PORTABLE    Result Date: 8/10/2023  No acute intrathoracic process. ASSESSMENT & PLAN     ASSESSMENT / PLAN:       IMPRESSION  This is a 80 y.o. female who presented with altered mental status and found to have abnormal UA. Principal Problem:   Acute Delirium  Active Problems:    Complicated UTI-resolved    OLIVA    H/O Drug-induced parkinsonism    Urinary retention     QTc prolongation    Severe malnutrition    Acute Delirium   - Probable secondary to dementia   -Klonopin reduced to  0.25 mg   -Avoid Benzodiazepines  -Seroquel can be utilized as an emergency medication if there is increased agitation. 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     OLIVA - Resolved  -Creatinine down to 0.9 from 1.2 with eGFR > 60    OUSMANE  -Taking following medications : Seroquel 25 mg BD,  Klonopin 0.5 mg BD cut down to 0.25 BD if patient continues to have fluctuating cognition, duloxetine 60 mg extended release, buspirone 15 mg  -Patient's ammonia, lactate, WBC count are within normal limits  -Continue propranolol  -Avoid benzodiazepines    Urinary retention  - Urology on board  -Croft's catheter is on  -Avoid anticholinergics    H/O Drug induced Parkinsonism  -Resolved on propranolol     QTc prolongation-resolved  -The QTc interval is normal  -Monitor K and Mg levels  -Maintain potassium

## 2023-08-27 NOTE — PLAN OF CARE
Problem: Discharge Planning  Goal: Discharge to home or other facility with appropriate resources  Outcome: Progressing     Problem: Skin/Tissue Integrity  Goal: Absence of new skin breakdown  Description: 1. Monitor for areas of redness and/or skin breakdown  2. Assess vascular access sites hourly  3. Every 4-6 hours minimum:  Change oxygen saturation probe site  4. Every 4-6 hours:  If on nasal continuous positive airway pressure, respiratory therapy assess nares and determine need for appliance change or resting period. Outcome: Progressing     Problem: Safety - Adult  Goal: Free from fall injury  Outcome: Progressing     Problem: ABCDS Injury Assessment  Goal: Absence of physical injury  Outcome: Progressing     Problem: Confusion  Goal: Confusion, delirium, dementia, or psychosis is improved or at baseline  Description: INTERVENTIONS:  1. Assess for possible contributors to thought disturbance, including medications, impaired vision or hearing, underlying metabolic abnormalities, dehydration, psychiatric diagnoses, and notify attending LIP  2. [place] high risk fall precautions, as indicated  3. Provide frequent short contacts to provide reality reorientation, refocusing and direction  4. Decrease environmental stimuli, including noise as appropriate  5. Monitor and intervene to maintain adequate nutrition, hydration, elimination, sleep and activity  6. If unable to ensure safety without constant attention obtain sitter and review sitter guidelines with assigned personnel  7.  Initiate Psychosocial CNS and Spiritual Care consult, as indicated  Outcome: Progressing

## 2023-08-28 LAB
ANION GAP SERPL CALCULATED.3IONS-SCNC: 9 MMOL/L (ref 9–17)
BUN SERPL-MCNC: 10 MG/DL (ref 8–23)
CALCIUM SERPL-MCNC: 9.8 MG/DL (ref 8.6–10.4)
CHLORIDE SERPL-SCNC: 95 MMOL/L (ref 98–107)
CO2 SERPL-SCNC: 26 MMOL/L (ref 20–31)
CREAT SERPL-MCNC: 0.6 MG/DL (ref 0.5–0.9)
ERYTHROCYTE [DISTWIDTH] IN BLOOD BY AUTOMATED COUNT: 13.8 % (ref 11.8–14.4)
GFR SERPL CREATININE-BSD FRML MDRD: >60 ML/MIN/1.73M2
GLUCOSE SERPL-MCNC: 99 MG/DL (ref 70–99)
HCT VFR BLD AUTO: 35.3 % (ref 36.3–47.1)
HGB BLD-MCNC: 11.5 G/DL (ref 11.9–15.1)
MCH RBC QN AUTO: 31 PG (ref 25.2–33.5)
MCHC RBC AUTO-ENTMCNC: 32.6 G/DL (ref 28.4–34.8)
MCV RBC AUTO: 95.1 FL (ref 82.6–102.9)
NRBC BLD-RTO: 0 PER 100 WBC
PLATELET # BLD AUTO: 333 K/UL (ref 138–453)
PMV BLD AUTO: 10.2 FL (ref 8.1–13.5)
POTASSIUM SERPL-SCNC: 3.2 MMOL/L (ref 3.7–5.3)
RBC # BLD AUTO: 3.71 M/UL (ref 3.95–5.11)
SODIUM SERPL-SCNC: 130 MMOL/L (ref 135–144)
WBC OTHER # BLD: 11.2 K/UL (ref 3.5–11.3)

## 2023-08-28 PROCEDURE — 99232 SBSQ HOSP IP/OBS MODERATE 35: CPT | Performed by: STUDENT IN AN ORGANIZED HEALTH CARE EDUCATION/TRAINING PROGRAM

## 2023-08-28 PROCEDURE — 6360000002 HC RX W HCPCS

## 2023-08-28 PROCEDURE — 36415 COLL VENOUS BLD VENIPUNCTURE: CPT

## 2023-08-28 PROCEDURE — 99232 SBSQ HOSP IP/OBS MODERATE 35: CPT | Performed by: INTERNAL MEDICINE

## 2023-08-28 PROCEDURE — 6370000000 HC RX 637 (ALT 250 FOR IP)

## 2023-08-28 PROCEDURE — 2580000003 HC RX 258

## 2023-08-28 PROCEDURE — 1200000000 HC SEMI PRIVATE

## 2023-08-28 PROCEDURE — 85027 COMPLETE CBC AUTOMATED: CPT

## 2023-08-28 PROCEDURE — 6370000000 HC RX 637 (ALT 250 FOR IP): Performed by: INTERNAL MEDICINE

## 2023-08-28 PROCEDURE — 80048 BASIC METABOLIC PNL TOTAL CA: CPT

## 2023-08-28 RX ORDER — POTASSIUM CHLORIDE 20 MEQ/1
40 TABLET, EXTENDED RELEASE ORAL ONCE
Status: COMPLETED | OUTPATIENT
Start: 2023-08-28 | End: 2023-08-28

## 2023-08-28 RX ADMIN — CLONAZEPAM 0.25 MG: 0.5 TABLET ORAL at 19:46

## 2023-08-28 RX ADMIN — ENOXAPARIN SODIUM 40 MG: 100 INJECTION SUBCUTANEOUS at 08:17

## 2023-08-28 RX ADMIN — STANDARDIZED SENNA CONCENTRATE AND DOCUSATE SODIUM 2 TABLET: 8.6; 5 TABLET ORAL at 08:16

## 2023-08-28 RX ADMIN — ATORVASTATIN CALCIUM 20 MG: 20 TABLET, FILM COATED ORAL at 08:17

## 2023-08-28 RX ADMIN — BUSPIRONE HYDROCHLORIDE 15 MG: 15 TABLET ORAL at 13:56

## 2023-08-28 RX ADMIN — BUSPIRONE HYDROCHLORIDE 15 MG: 15 TABLET ORAL at 08:17

## 2023-08-28 RX ADMIN — DULOXETINE HYDROCHLORIDE 60 MG: 30 CAPSULE, DELAYED RELEASE ORAL at 08:16

## 2023-08-28 RX ADMIN — POTASSIUM CHLORIDE 40 MEQ: 1500 TABLET, EXTENDED RELEASE ORAL at 13:57

## 2023-08-28 RX ADMIN — QUETIAPINE FUMARATE 25 MG: 25 TABLET ORAL at 19:46

## 2023-08-28 RX ADMIN — PROPRANOLOL HYDROCHLORIDE 20 MG: 20 TABLET ORAL at 08:16

## 2023-08-28 RX ADMIN — CLONAZEPAM 0.25 MG: 0.5 TABLET ORAL at 08:16

## 2023-08-28 RX ADMIN — SODIUM CHLORIDE, PRESERVATIVE FREE 10 ML: 5 INJECTION INTRAVENOUS at 20:02

## 2023-08-28 RX ADMIN — SODIUM CHLORIDE, PRESERVATIVE FREE 10 ML: 5 INJECTION INTRAVENOUS at 08:17

## 2023-08-28 ASSESSMENT — ENCOUNTER SYMPTOMS
GASTROINTESTINAL NEGATIVE: 1
RESPIRATORY NEGATIVE: 1
EYES NEGATIVE: 1

## 2023-08-28 ASSESSMENT — PAIN SCALES - GENERAL: PAINLEVEL_OUTOF10: 0

## 2023-08-28 NOTE — PROGRESS NOTES
[facility] - Progress Note    Today's Date and Time: 8/28/2023, 9:16 AM    Impression :   Altered mentation-better  Recent hx MRSA UTI on 7/27/23-Treated with Macrobid  Urinary retention  Hypokalemia  GERD  Anxiety  Parkinson's    Recommendations:   Monitor off antibiotics   D/C IV Vancomycin- No significant growth on urine culture from 8/10/23  Urology recommendations  Stable for D/C from ID perspective    Medical Decision Making/Summary/Discussion:8/28/2023       Infection Control Recommendations   [place] Precautions  Contact Precautions for MRSA hx    Antimicrobial Stewardship Recommendations     Discontinuation of therapy  Coordination of Outpatient Care:   Estimated Length of IV antimicrobials:NA  Patient will need Midline Catheter Insertion: No  Patient will need PICC line Insertion:No  Patient will need: Home IV , [address] [facility],  SNF,  LTAC: No  Patient will need outpatient wound care: No    Chief complaint/reason for consultation:   MRSA UTI      History of Present Illness:   Alejandro Tierney is a 80y.o.-year-old  female who was initially admitted on 8/10/2023. Patient seen at the request of Dr. Jf Leon:    This patient, who sees Dr. Mariana Camacho, with Urology, for urinary retention, and was treated for a MRSA UTI with Macrobid at the end of July 2023, presented to the ED from her nursing home with confusion x 24 hrs on 8/10/23. She has a history of anxiety that is treated with Klonopin. Her right pupil was noted to be dilated. A CT of her head was negative for any acute abnormalities. Labwork was mostly unremarkable other than hypokalemia and slightly elevated creatinine. The patient was given fluids and was administered a dose of IV Rocephin and was initiated on IV Vancomycin. Blood cultures have yielded no growth thus far and the urine culture shows no significant growth.      Urology was consulted and a CT abd/pelvis was interna. No acute intracranial abnormality. Similar senescent findings, as above. XR CHEST PORTABLE    Result Date: 8/10/2023  EXAMINATION: ONE XRAY VIEW OF THE CHEST 8/10/2023 12:05 pm COMPARISON: 07/02/2023 HISTORY: ORDERING SYSTEM PROVIDED HISTORY: AMS TECHNOLOGIST PROVIDED HISTORY: AMS Reason for Exam: altered mental status, port upr. FINDINGS: Cardiomediastinal silhouette and pulmonary vasculature are within normal limits. No focal airspace consolidation, pneumothorax, or pleural effusion. No free air beneath the diaphragm. No acute osseous abnormality. No acute intrathoracic process. Medical Decision [id number]:     Results       ** No results found for the last 336 hours. **          Contains abnormal data Culture, Urine  Order: [id number]  Status: Final result     Visible to patient: Yes (not seen)     Next appt: 09/07/2023 at 09:15 AM in Family Medicine ([address], [place])     Dx: Dysuria     Specimen Information: Urine, straight catheter   2 Result Notes      Component    Specimen Description . URINE,STRAIGHT CATHETER    Culture METHICILLIN RESISTANT STAPHYLOCOCCUS AUREUS >100,000 CFU/ML Abnormal     Resulting [address]        Susceptibility    Methicillin-Resistant Staphylococcus aureus (1)    Antibiotic Interpretation Microscan Method Status    penicillin Resistant >=0.5 BACTERIAL SUSCEPTIBILITY PANEL SIDDHARTH Final    gentamicin Sensitive <=0.5 BACTERIAL SUSCEPTIBILITY PANEL SIDDHARTH Final     Gentamicin is used only in combination with other active agents that test susceptible.        levofloxacin Intermediate 4 BACTERIAL SUSCEPTIBILITY PANEL SIDDHARTH Final    nitrofurantoin Sensitive <=16 BACTERIAL SUSCEPTIBILITY PANEL SIDDHARTH Final    oxacillin Resistant >=4 BACTERIAL SUSCEPTIBILITY PANEL SIDDHARTH Final    tetracycline Sensitive <=1 BACTERIAL SUSCEPTIBILITY PANEL SIDDHARTH Final    trimethoprim-sulfamethoxazole Sensitive <=10 BACTERIAL SUSCEPTIBILITY PANEL SIDDHARTH Final

## 2023-08-28 NOTE — PROGRESS NOTES
[facility]  Internal Medicine Teaching Residency Program  Inpatient Daily Progress Note  ______________________________________________________________________________    Patient: Frannie Keyes  YOB: 1940   IBM:[id number]    Acct: [de-identified]     Room: [address] [place] [address]  Admit date: 8/10/2023  Today's date: 08/28/23  Number of days in the hospital: 18    SUBJECTIVE   Admitting Diagnosis: Delirium  CC: Altered Mental status    Pt examined at bedside. Chart & results reviewed.   -No overnight acute events  -Patient is oriented to place and person  -Pt has foleys catheter in  -Patient had suicidal ideations 2 days back for which psychaitry consulted and a sitter was provided   - Currently she denies of suicidal ideation  - Psychiatry recommends discontinue the sitter and continue same medications      Review of Systems   Constitutional:  Positive for appetite change. HENT: Negative. Eyes: Negative. Respiratory: Negative. Cardiovascular: Negative. Gastrointestinal: Negative. Endocrine: Negative. Genitourinary: Negative. Musculoskeletal: Negative. Neurological: Negative. Hematological: Negative. Psychiatric/Behavioral: Negative. BRIEF HISTORY     The patient is a 80 y.o. female with past medical history of generalized anxiety disorder, parkinsonism, colitis, acute urinary retention, OLIVA presented  with altered mental status. As per her son the patient is being disoriented for more than 24 hours in the nursing facility. As per the medical record she was admitted in the hospital on 7/2/2023 for the management of generalized anxiety disorder.   The patient had repeated episodes of anxiety and tachycardia throughout her hospital stay and she was managed with Ativan and Seroquel as needed, later it was changed to Klonopin by psychiatrist.  During her hospital stay she had a colitis episode for which she was treated with Medications:  Scheduled Medications:    clonazePAM  0.25 mg Oral BID    propranolol  20 mg Oral BID    atorvastatin  20 mg Oral Daily    sennosides-docusate sodium  2 tablet Oral Daily    busPIRone  15 mg Oral TID    DULoxetine  60 mg Oral BID    sodium chloride flush  5-40 mL IntraVENous 2 times per day    enoxaparin  40 mg SubCUTAneous Daily    QUEtiapine  25 mg Oral Nightly     Continuous Infusions:    dextrose      sodium chloride 10 mL/hr at 08/12/23 1458     PRN Medicationslabetalol, 10 mg, Q4H PRN  glucose, 4 tablet, PRN  dextrose bolus, 125 mL, PRN   Or  dextrose bolus, 250 mL, PRN  glucagon (rDNA), 1 mg, PRN  dextrose, , Continuous PRN  haloperidol lactate, 5 mg, Q6H PRN  sodium chloride flush, 5-40 mL, PRN  sodium chloride, , PRN  ondansetron, 4 mg, Q8H PRN   Or  ondansetron, 4 mg, Q6H PRN  polyethylene glycol, 17 g, Daily PRN  acetaminophen, 650 mg, Q6H PRN   Or  acetaminophen, 650 mg, Q6H PRN        Diagnostic Labs:  CBC:   No results for input(s): WBC, RBC, HGB, HCT, MCV, RDW, PLT in the last 72 hours. BMP:   No results for input(s): NA, K, CL, CO2, PHOS, BUN, CREATININE, CA in the last 72 hours. BNP: No results for input(s): BNP in the last 72 hours. PT/INR: No results for input(s): PROTIME, INR in the last 72 hours. APTT: No results for input(s): APTT in the last 72 hours. CARDIAC ENZYMES: No results for input(s): CKMB, CKMBINDEX, TROPONINI in the last 72 hours. Invalid input(s): CKTOTAL;3  FASTING LIPID PANEL:  Lab Results   Component Value Date    CHOL 150 12/22/2022    HDL 57 12/22/2022    TRIG 64 12/22/2022     LIVER PROFILE:   No results for input(s): AST, ALT, ALB, BILIDIR, BILITOT, ALKPHOS in the last 72 hours. MICROBIOLOGY:   Lab Results   Component Value Date/Time    CULTURE NO SIGNIFICANT GROWTH 08/10/2023 12:22 PM       Imaging:    CT ABDOMEN PELVIS WO CONTRAST Additional Contrast? None    Result Date: 8/11/2023  1. No acute process in the abdomen or pelvis.

## 2023-08-28 NOTE — PROGRESS NOTES
Physical Therapy        Physical Therapy Cancel Note      DATE: 2023    NAME: Oseas Alvarado  MRN: [id number]   : 1940      Patient not seen this date for Physical Therapy due to:    Patient Declined: Pt stating \"I can't work under these conditions\", declining to participate in therapy this afternoon. Will continue to check back on pt as appropriate.        Electronically signed by Dean Lewis PTA on 2023 at 3:03 PM

## 2023-08-28 NOTE — PROGRESS NOTES
Comprehensive Nutrition Assessment    Type and Reason for Visit:  Reassess    Nutrition Recommendations/Plan:   Continue current diet and ONS  Change diet to easy to chew. May benefit from speech consult  Recommend appetite stimulant and nutrition support  Provide assistance and encouragement with meals. Malnutrition Assessment:  Malnutrition Status:  Severe malnutrition (08/12/23 1447)    Context:  Chronic Illness     Findings of the 6 clinical characteristics of malnutrition:  Energy Intake:  75% or less estimated energy requirements for 1 month or longer  Weight Loss:  Greater than 20% over 1 year     Body Fat Loss:  Mild body fat loss Orbital   Muscle Mass Loss:  Mild muscle mass loss Hand (interosseous)  Fluid Accumulation:  No significant fluid accumulation     Strength:  Not Performed    Nutrition Assessment:    Patient seen for follow up. She continues with extremely poor intake. She reports she is consuming Ensure, unsure how much or how often. She does feel that she is having increased difficulty chewing, may benefit from speech therapy assessment. Diet changed to \"easy to chew\". Recommend provide assistance and encouragement with meals as able. Continue to recommend appetite stimulant and nutrition support as patient's intake is poor. Labs/meds reviewed. Nutrition Related Findings:    labs/meds reviewed Wound Type: None       Current Nutrition Intake & Therapies:    Average Meal Intake: 0%, 1-25%  Average Supplements Intake: Unable to assess  ADULT ORAL NUTRITION SUPPLEMENT; Lunch, Dinner; Standard High Calorie/High Protein Oral Supplement  ADULT DIET; Easy to Chew; Safety Tray; Safety Tray (Disposables)    Anthropometric Measures:  Height: 5' 4\" (162.6 cm)  Ideal Body Weight (IBW): 120 lbs (55 kg)    Admission Body Weight: 114 lb 13.8 oz (52.1 kg) (8/12)  Current Body Weight: 113 lb 8.6 oz (51.5 kg), 94.6 % IBW.  Weight Source: Bed Scale  Current BMI (kg/m2): 19.5  Usual Body Weight: 146 lb

## 2023-08-28 NOTE — CARE COORDINATION
[facility],Third Floor [phone],    Spoke with Dilshad Foy, disconnected. Job Davis on her personal phone. No Answer. Message left requesting return call. [address]ki with [facility], states Camila Corbin is not in. Will have her call writer when she arrives. [address] spoke with Camila Corbin, still awaiting pre cert.

## 2023-08-28 NOTE — PLAN OF CARE
Problem: Discharge Planning  Goal: Discharge to home or other facility with appropriate resources  Outcome: Progressing     Problem: Skin/Tissue Integrity  Goal: Absence of new skin breakdown  Description: 1. Monitor for areas of redness and/or skin breakdown  2. Assess vascular access sites hourly  3. Every 4-6 hours minimum:  Change oxygen saturation probe site  4. Every 4-6 hours:  If on nasal continuous positive airway pressure, respiratory therapy assess nares and determine need for appliance change or resting period. Outcome: Progressing     Problem: Safety - Adult  Goal: Free from fall injury  Outcome: Progressing     Problem: ABCDS Injury Assessment  Goal: Absence of physical injury  Outcome: Progressing     Problem: Confusion  Goal: Confusion, delirium, dementia, or psychosis is improved or at baseline  Description: INTERVENTIONS:  1. Assess for possible contributors to thought disturbance, including medications, impaired vision or hearing, underlying metabolic abnormalities, dehydration, psychiatric diagnoses, and notify attending LIP  2. [place] high risk fall precautions, as indicated  3. Provide frequent short contacts to provide reality reorientation, refocusing and direction  4. Decrease environmental stimuli, including noise as appropriate  5. Monitor and intervene to maintain adequate nutrition, hydration, elimination, sleep and activity  6. If unable to ensure safety without constant attention obtain sitter and review sitter guidelines with assigned personnel  7.  Initiate Psychosocial CNS and Spiritual Care consult, as indicated  Outcome: Progressing     Problem: Nutrition Deficit:  Goal: Optimize nutritional status  Outcome: Progressing     Problem: Pain  Goal: Verbalizes/displays adequate comfort level or baseline comfort level  Outcome: Progressing

## 2023-08-29 PROCEDURE — 2580000003 HC RX 258

## 2023-08-29 PROCEDURE — 99232 SBSQ HOSP IP/OBS MODERATE 35: CPT | Performed by: INTERNAL MEDICINE

## 2023-08-29 PROCEDURE — 6370000000 HC RX 637 (ALT 250 FOR IP)

## 2023-08-29 PROCEDURE — 6370000000 HC RX 637 (ALT 250 FOR IP): Performed by: INTERNAL MEDICINE

## 2023-08-29 PROCEDURE — 99232 SBSQ HOSP IP/OBS MODERATE 35: CPT | Performed by: STUDENT IN AN ORGANIZED HEALTH CARE EDUCATION/TRAINING PROGRAM

## 2023-08-29 PROCEDURE — 97530 THERAPEUTIC ACTIVITIES: CPT

## 2023-08-29 PROCEDURE — 97535 SELF CARE MNGMENT TRAINING: CPT

## 2023-08-29 PROCEDURE — 1200000000 HC SEMI PRIVATE

## 2023-08-29 RX ORDER — POTASSIUM CHLORIDE 20 MEQ/1
40 TABLET, EXTENDED RELEASE ORAL ONCE
Status: DISCONTINUED | OUTPATIENT
Start: 2023-08-29 | End: 2023-08-29

## 2023-08-29 RX ORDER — MIRTAZAPINE 15 MG/1
7.5 TABLET, ORALLY DISINTEGRATING ORAL NIGHTLY
Status: DISCONTINUED | OUTPATIENT
Start: 2023-08-29 | End: 2023-09-01

## 2023-08-29 RX ADMIN — MIRTAZAPINE 7.5 MG: 15 TABLET, ORALLY DISINTEGRATING ORAL at 20:44

## 2023-08-29 RX ADMIN — BUSPIRONE HYDROCHLORIDE 15 MG: 15 TABLET ORAL at 08:11

## 2023-08-29 RX ADMIN — STANDARDIZED SENNA CONCENTRATE AND DOCUSATE SODIUM 2 TABLET: 8.6; 5 TABLET ORAL at 08:10

## 2023-08-29 RX ADMIN — BUSPIRONE HYDROCHLORIDE 15 MG: 15 TABLET ORAL at 20:43

## 2023-08-29 RX ADMIN — SODIUM CHLORIDE, PRESERVATIVE FREE 10 ML: 5 INJECTION INTRAVENOUS at 08:11

## 2023-08-29 RX ADMIN — CLONAZEPAM 0.25 MG: 0.5 TABLET ORAL at 08:07

## 2023-08-29 RX ADMIN — CLONAZEPAM 0.25 MG: 0.5 TABLET ORAL at 20:42

## 2023-08-29 RX ADMIN — DULOXETINE HYDROCHLORIDE 60 MG: 30 CAPSULE, DELAYED RELEASE ORAL at 08:10

## 2023-08-29 RX ADMIN — QUETIAPINE FUMARATE 25 MG: 25 TABLET ORAL at 20:43

## 2023-08-29 RX ADMIN — DULOXETINE HYDROCHLORIDE 60 MG: 30 CAPSULE, DELAYED RELEASE ORAL at 20:43

## 2023-08-29 RX ADMIN — ATORVASTATIN CALCIUM 20 MG: 20 TABLET, FILM COATED ORAL at 08:10

## 2023-08-29 RX ADMIN — PROPRANOLOL HYDROCHLORIDE 20 MG: 20 TABLET ORAL at 20:43

## 2023-08-29 RX ADMIN — BUSPIRONE HYDROCHLORIDE 15 MG: 15 TABLET ORAL at 14:32

## 2023-08-29 RX ADMIN — PROPRANOLOL HYDROCHLORIDE 20 MG: 20 TABLET ORAL at 08:11

## 2023-08-29 RX ADMIN — SODIUM CHLORIDE, PRESERVATIVE FREE 10 ML: 5 INJECTION INTRAVENOUS at 21:01

## 2023-08-29 ASSESSMENT — ENCOUNTER SYMPTOMS
RESPIRATORY NEGATIVE: 1
GASTROINTESTINAL NEGATIVE: 1
EYES NEGATIVE: 1

## 2023-08-29 ASSESSMENT — PAIN SCALES - GENERAL: PAINLEVEL_OUTOF10: 0

## 2023-08-29 NOTE — PLAN OF CARE
Problem: Skin/Tissue Integrity  Goal: Absence of new skin breakdown  Description: 1. Monitor for areas of redness and/or skin breakdown  2. Assess vascular access sites hourly  3. Every 4-6 hours minimum:  Change oxygen saturation probe site  4. Every 4-6 hours:  If on nasal continuous positive airway pressure, respiratory therapy assess nares and determine need for appliance change or resting period.   8/29/2023 [id number] by Aristides Cruz RN  Outcome: Progressing

## 2023-08-29 NOTE — PLAN OF CARE
Problem: Discharge Planning  Goal: Discharge to home or other facility with appropriate resources  Outcome: Progressing  Flowsheets (Taken 8/29/2023 0758)  Discharge to home or other facility with appropriate resources: Identify barriers to discharge with patient and caregiver     Problem: Skin/Tissue Integrity  Goal: Absence of new skin breakdown  Description: 1. Monitor for areas of redness and/or skin breakdown  2. Assess vascular access sites hourly  3. Every 4-6 hours minimum:  Change oxygen saturation probe site  4. Every 4-6 hours:  If on nasal continuous positive airway pressure, respiratory therapy assess nares and determine need for appliance change or resting period. 8/29/2023 1524 by Jennifer Ragland RN  Outcome: Progressing  8/29/2023 0609 by Rei Pavon RN  Outcome: Progressing     Problem: Safety - Adult  Goal: Free from fall injury  Outcome: Progressing  Flowsheets (Taken 8/29/2023 0720)  Free From Fall Injury: Instruct family/caregiver on patient safety     Problem: ABCDS Injury Assessment  Goal: Absence of physical injury  Outcome: Progressing  Flowsheets (Taken 8/29/2023 0720)  Absence of Physical Injury: Implement safety measures based on patient assessment     Problem: Confusion  Goal: Confusion, delirium, dementia, or psychosis is improved or at baseline  Description: INTERVENTIONS:  1. Assess for possible contributors to thought disturbance, including medications, impaired vision or hearing, underlying metabolic abnormalities, dehydration, psychiatric diagnoses, and notify attending LIP  2. [place] high risk fall precautions, as indicated  3. Provide frequent short contacts to provide reality reorientation, refocusing and direction  4. Decrease environmental stimuli, including noise as appropriate  5. Monitor and intervene to maintain adequate nutrition, hydration, elimination, sleep and activity  6.  If unable to ensure safety without constant attention obtain sitter and review

## 2023-08-29 NOTE — PROGRESS NOTES
Occupational Therapy  Facility/Department: [facility] RENAL//MED SURG  Occupational Therapy Daily Treatment Note    Name: Chris Arellano  : 1940  MRN: [id number]  Date of Service: 2023    Discharge Recommendations:  Patient would benefit from continued therapy after discharge          Patient Diagnosis(es): The primary encounter diagnosis was Septicemia [facility]). Diagnoses of Altered mental status, unspecified altered mental status type, Acute cystitis without hematuria, and OUSMANE (generalized anxiety disorder) were also pertinent to this visit. Past Medical History:  has a past medical history of OLIVA (acute kidney injury) ([address] [facility]), Anxiety and depression, Depression, GERD (gastroesophageal reflux disease), and Weight loss. Past Surgical History:  has a past surgical history that includes Spine surgery; Tonsillectomy; Colonoscopy (2011); eye surgery; Upper gastrointestinal endoscopy (2014); Colonoscopy (2014); Endoscopy, colon, diagnostic; Coccyx removal (1950); and Breast biopsy (Right, ). Treatment Diagnosis: Delirium      Assessment   Performance deficits / Impairments: Decreased functional mobility ; Decreased safe awareness;Decreased balance;Decreased ADL status; Decreased endurance;Decreased high-level IADLs;Decreased cognition;Decreased coordination;Decreased posture;Decreased strength;Decreased fine motor control  Assessment: Pt continues to require assist for all ADL tasks and transfers this session. Pt limited with all direction following this session and required increased time for all tasks.   Treatment Diagnosis: Delirium  Prognosis: Good  REQUIRES OT FOLLOW-UP: Yes  Activity Tolerance  Activity Tolerance: Treatment limited secondary to decreased cognition;Patient limited by fatigue  Activity Tolerance Comments: hx of parkinsons disease, pt self-limiting throughout session        Plan   Occupational Therapy Plan  Times Per Week: 3 x week Restrictions  Restrictions/Precautions  Restrictions/Precautions: Up as Tolerated, Bed Alarm, Contact Precautions, Other (comment)  Required Braces or Orthoses?: No  Position Activity Restriction  Other position/activity restrictions: Recent tachycardia    Subjective   General  Chart Reviewed: Yes  Patient assessed for rehabilitation services?: Yes  Response to previous treatment: Patient with no complaints from previous session  Family / Caregiver Present: No  General Comment  Comments: Pt and RN agreeable to therapy this day. Pt supine in bed at start/end of session with call light in reach, bed alarm on and all needs met and RN notified of progression of session. Pt denied pain throughout session. Pt required words of multiple encouragement throughout session and became agitated over directions and instructions. Pt completed bed mobility, sat EOB, completed functional transfer and mobility. While seated in chair pt completed ADLs. Pt was thanked for participation. Objective      Safety Devices  Type of Devices: Gait belt;Left in bed;Call light within reach; Patient at risk for falls; Bed alarm in place  Restraints  Restraints Initially in Place: No  Balance  Sitting: Without support (Pt tolerated approx 15 min static/dynamic sitting unsupported, with rest breaks between ADL tasks at SBA, EOB and edge of chair.)  Standing: With support (Min A static/dynamic standing with RW tolerated approx 3-4 min. V/c for proper foot placement to increase standing balance towards end of task. Unsteady while standing and completing dynamic ADLs.)  Transfer Training  Transfer Training: Yes  Overall Level of Assistance: Adaptive equipment; Additional time;Contact-guard assistance (w/ RW)  Interventions: Safety awareness training;Verbal cues (VCs for pushing off of the surface that they are seated on; fair return)  Sit to Stand: Adaptive equipment; Additional time;Contact-guard assistance (From EOB and edge of chair)  Stand to Sit: Adaptive equipment; Additional time;Contact-guard assistance  Bed to Chair: Adaptive equipment; Additional time;Contact-guard assistance  Gait  Overall Level of Assistance: Contact-guard assistance;Assist X1;Additional time; Adaptive equipment (w/ RW, pt completed functional mobility of simulated house hold distances from EOB<>chair, unsteady to complete task, no LOB. time: ~2 mins)  Interventions: Safety awareness training;Verbal cues (VCs for wider KARUNA when completing functional mobility; fair return)  Assistive Device: Gait belt;Walker, rolling        ADL  Feeding: Setup;Contact guard assistance  Feeding Skilled Clinical Factors: to open containers  Grooming: Setup;Verbal cueing; Increased time to complete;Stand by assistance  Grooming Skilled Clinical Factors: face washing, oral care and hair brushing facilitated while sitting in chair and EOB. . Oral care and hair brushing facilitated sitting EOB. UE Bathing: Increased time to complete;Setup;Verbal cueing;Stand by assistance  UE Bathing Skilled Clinical Factors: Pt completed washing UE and under UE and chest area, assisted with washing back  LE Bathing Skilled Clinical Factors: declined this date  UE Dressing: Minimal assistance;Setup; Increased time to complete;Verbal cueing  UE Dressing Skilled Clinical Factors: to doff/don gown and back gown while seated and donning gown as simulated robe standing w/ RW  Additional Comments: Pt agreeable to ADL tasks. Requiring increased time with increased verbal/visual/tactile cues for sequencing and initiation. Bed mobility  Supine to Sit: Stand by assistance  Sit to Supine: Minimal assistance (progression of B LE)  Scooting: Minimal assistance  Bed Mobility Comments: HOB elevated, with use of bed rails        Cognition  Overall Cognitive Status: Exceptions  Arousal/Alertness: Appropriate responses to stimuli  Following Commands: Follows multistep commands with repitition; Follows multistep commands with

## 2023-08-29 NOTE — PROGRESS NOTES
Patient refuses po K pill says its too big  Dr. Gabriela Rodríguez via perfect serve informed patient said she would drink the liquid K

## 2023-08-29 NOTE — PROGRESS NOTES
Physical Therapy        Physical Therapy Cancel Note      DATE: 2023    NAME: Roger Orozco  MRN: [id number]   : 1940      Patient not seen this date for Physical Therapy due to:    Patient Declined: Pt gave no reason, she is cognitively impaired, declined to mobilize.         Electronically signed by Aditya Ernst PTA on 2023 at 4:09 PM

## 2023-08-29 NOTE — PROGRESS NOTES
[facility] - Progress Note    Today's Date and Time: 8/29/2023, 9:49 AM    Impression :   Altered mentation-better  Recent hx MRSA UTI on 7/27/23-Treated with Macrobid  Urinary retention  Hypokalemia  GERD  Anxiety  Parkinson's    Recommendations:   Monitor off antibiotics   D/C IV Vancomycin- No significant growth on urine culture from 8/10/23  Urology recommendations  Stable for D/C from ID perspective    Medical Decision Making/Summary/Discussion:8/29/2023       Infection Control Recommendations   [place] Precautions  Contact Precautions for MRSA hx    Antimicrobial Stewardship Recommendations     Discontinuation of therapy  Coordination of Outpatient Care:   Estimated Length of IV antimicrobials:NA  Patient will need Midline Catheter Insertion: No  Patient will need PICC line Insertion:No  Patient will need: Home IV , [address] [place],  SNF,  LTAC: No  Patient will need outpatient wound care: No    Chief complaint/reason for consultation:   MRSA UTI      History of Present Illness:   Chris Arellano is a 80y.o.-year-old  female who was initially admitted on 8/10/2023. Patient seen at the request of Dr. Roxana Galan:    This patient, who sees Dr. Patrica Hamlin, with Urology, for urinary retention, and was treated for a MRSA UTI with Macrobid at the end of July 2023, presented to the ED from her nursing home with confusion x 24 hrs on 8/10/23. She has a history of anxiety that is treated with Klonopin. Her right pupil was noted to be dilated. A CT of her head was negative for any acute abnormalities. Labwork was mostly unremarkable other than hypokalemia and slightly elevated creatinine. The patient was given fluids and was administered a dose of IV Rocephin and was initiated on IV Vancomycin. Blood cultures have yielded no growth thus far and the urine culture shows no significant growth.      Urology was consulted and a CT abd/pelvis was intracranial hemorrhage, mass effect, or  midline shift. There is satisfactory overall gray-white matter  differentiation. The ventricular structures are symmetric and unremarkable. The infratentorial structures are unremarkable. ORBITS: The visualized portion of the orbits demonstrate no acute abnormality. SINUSES: The visualized paranasal sinuses and mastoid air cells demonstrate  no acute abnormality. SOFT TISSUES/SKULL:  No acute abnormality of the visualized skull or soft  tissues. IMPRESSION:  No acute intracranial abnormality. CXR   8/10/23          Discussed diagnosis, planned studies and treatment plans with patient, RN, IM. I have personally reviewed the past medical history, past surgical history, medications, social history, and family history, and I have updated the database accordingly.   Past Medical History:     Past Medical History:   Diagnosis Date    OLIVA (acute kidney injury) ([address]) 7/8/2023    Anxiety and depression     Depression     GERD (gastroesophageal reflux disease)     Weight loss        Past Surgical  History:     Past Surgical History:   Procedure Laterality Date    BREAST BIOPSY Right 2010    COCCYX REMOVAL  09/01/1950    COLONOSCOPY  01/01/2011    COLONOSCOPY  04/25/2014    ENDOSCOPY, COLON, DIAGNOSTIC      EYE SURGERY      injury to eye    SPINE SURGERY      tailbone removed as teenager    TONSILLECTOMY      UPPER GASTROINTESTINAL ENDOSCOPY  04/25/2014       Medications:      potassium chloride  40 mEq Oral Once    clonazePAM  0.25 mg Oral BID    propranolol  20 mg Oral BID    atorvastatin  20 mg Oral Daily    sennosides-docusate sodium  2 tablet Oral Daily    busPIRone  15 mg Oral TID    DULoxetine  60 mg Oral BID    sodium chloride flush  5-40 mL IntraVENous 2 times per day    enoxaparin  40 mg SubCUTAneous Daily    QUEtiapine  25 mg Oral Nightly       Social History:     Social History     Socioeconomic History    Marital status: Single     Spouse name:

## 2023-08-29 NOTE — PROGRESS NOTES
Attempted to give patient  effervesant K x 2 , first in OJ  and it was to acidic than than put it in ensure and she said it made her nauseated .    Dr. De Garcia via perfect serve and informed of above  messaged read

## 2023-08-30 LAB
ANION GAP SERPL CALCULATED.3IONS-SCNC: 10 MMOL/L (ref 9–17)
BASOPHILS # BLD: 0.03 K/UL (ref 0–0.2)
BASOPHILS NFR BLD: 1 % (ref 0–2)
BUN SERPL-MCNC: 12 MG/DL (ref 8–23)
CALCIUM SERPL-MCNC: 9.8 MG/DL (ref 8.6–10.4)
CHLORIDE SERPL-SCNC: 102 MMOL/L (ref 98–107)
CO2 SERPL-SCNC: 27 MMOL/L (ref 20–31)
CREAT SERPL-MCNC: 0.4 MG/DL (ref 0.5–0.9)
EOSINOPHIL # BLD: 0.05 K/UL (ref 0–0.44)
EOSINOPHILS RELATIVE PERCENT: 1 % (ref 1–4)
ERYTHROCYTE [DISTWIDTH] IN BLOOD BY AUTOMATED COUNT: 14.2 % (ref 11.8–14.4)
GFR SERPL CREATININE-BSD FRML MDRD: >60 ML/MIN/1.73M2
GLUCOSE SERPL-MCNC: 92 MG/DL (ref 70–99)
HCT VFR BLD AUTO: 34.1 % (ref 36.3–47.1)
HGB BLD-MCNC: 11 G/DL (ref 11.9–15.1)
IMM GRANULOCYTES # BLD AUTO: <0.03 K/UL (ref 0–0.3)
IMM GRANULOCYTES NFR BLD: 0 %
LYMPHOCYTES NFR BLD: 2.49 K/UL (ref 1.1–3.7)
LYMPHOCYTES RELATIVE PERCENT: 46 % (ref 24–43)
MCH RBC QN AUTO: 31.9 PG (ref 25.2–33.5)
MCHC RBC AUTO-ENTMCNC: 32.3 G/DL (ref 28.4–34.8)
MCV RBC AUTO: 98.8 FL (ref 82.6–102.9)
MONOCYTES NFR BLD: 0.42 K/UL (ref 0.1–1.2)
MONOCYTES NFR BLD: 8 % (ref 3–12)
NEUTROPHILS NFR BLD: 45 % (ref 36–65)
NEUTS SEG NFR BLD: 2.43 K/UL (ref 1.5–8.1)
NRBC BLD-RTO: 0 PER 100 WBC
PLATELET # BLD AUTO: 283 K/UL (ref 138–453)
PMV BLD AUTO: 9.4 FL (ref 8.1–13.5)
POTASSIUM SERPL-SCNC: 3.7 MMOL/L (ref 3.7–5.3)
RBC # BLD AUTO: 3.45 M/UL (ref 3.95–5.11)
SODIUM SERPL-SCNC: 139 MMOL/L (ref 135–144)
WBC OTHER # BLD: 5.4 K/UL (ref 3.5–11.3)

## 2023-08-30 PROCEDURE — 97116 GAIT TRAINING THERAPY: CPT

## 2023-08-30 PROCEDURE — 1200000000 HC SEMI PRIVATE

## 2023-08-30 PROCEDURE — 6370000000 HC RX 637 (ALT 250 FOR IP)

## 2023-08-30 PROCEDURE — 99231 SBSQ HOSP IP/OBS SF/LOW 25: CPT | Performed by: STUDENT IN AN ORGANIZED HEALTH CARE EDUCATION/TRAINING PROGRAM

## 2023-08-30 PROCEDURE — 85025 COMPLETE CBC W/AUTO DIFF WBC: CPT

## 2023-08-30 PROCEDURE — 6370000000 HC RX 637 (ALT 250 FOR IP): Performed by: INTERNAL MEDICINE

## 2023-08-30 PROCEDURE — 6360000002 HC RX W HCPCS

## 2023-08-30 PROCEDURE — 36415 COLL VENOUS BLD VENIPUNCTURE: CPT

## 2023-08-30 PROCEDURE — 80048 BASIC METABOLIC PNL TOTAL CA: CPT

## 2023-08-30 PROCEDURE — 2580000003 HC RX 258

## 2023-08-30 PROCEDURE — 99232 SBSQ HOSP IP/OBS MODERATE 35: CPT | Performed by: INTERNAL MEDICINE

## 2023-08-30 RX ADMIN — SODIUM CHLORIDE, PRESERVATIVE FREE 10 ML: 5 INJECTION INTRAVENOUS at 08:37

## 2023-08-30 RX ADMIN — ATORVASTATIN CALCIUM 20 MG: 20 TABLET, FILM COATED ORAL at 08:36

## 2023-08-30 RX ADMIN — DULOXETINE HYDROCHLORIDE 60 MG: 30 CAPSULE, DELAYED RELEASE ORAL at 08:34

## 2023-08-30 RX ADMIN — DULOXETINE HYDROCHLORIDE 60 MG: 30 CAPSULE, DELAYED RELEASE ORAL at 23:59

## 2023-08-30 RX ADMIN — STANDARDIZED SENNA CONCENTRATE AND DOCUSATE SODIUM 2 TABLET: 8.6; 5 TABLET ORAL at 08:35

## 2023-08-30 RX ADMIN — SODIUM CHLORIDE, PRESERVATIVE FREE 10 ML: 5 INJECTION INTRAVENOUS at 23:59

## 2023-08-30 RX ADMIN — BUSPIRONE HYDROCHLORIDE 15 MG: 15 TABLET ORAL at 12:59

## 2023-08-30 RX ADMIN — PROPRANOLOL HYDROCHLORIDE 20 MG: 20 TABLET ORAL at 23:59

## 2023-08-30 RX ADMIN — BUSPIRONE HYDROCHLORIDE 15 MG: 15 TABLET ORAL at 23:59

## 2023-08-30 RX ADMIN — PROPRANOLOL HYDROCHLORIDE 20 MG: 20 TABLET ORAL at 08:34

## 2023-08-30 RX ADMIN — ENOXAPARIN SODIUM 40 MG: 100 INJECTION SUBCUTANEOUS at 08:38

## 2023-08-30 RX ADMIN — CLONAZEPAM 0.25 MG: 0.5 TABLET ORAL at 23:59

## 2023-08-30 RX ADMIN — QUETIAPINE FUMARATE 25 MG: 25 TABLET ORAL at 23:59

## 2023-08-30 RX ADMIN — CLONAZEPAM 0.25 MG: 0.5 TABLET ORAL at 08:34

## 2023-08-30 RX ADMIN — MIRTAZAPINE 7.5 MG: 15 TABLET, ORALLY DISINTEGRATING ORAL at 23:59

## 2023-08-30 RX ADMIN — BUSPIRONE HYDROCHLORIDE 15 MG: 15 TABLET ORAL at 08:34

## 2023-08-30 ASSESSMENT — ENCOUNTER SYMPTOMS
DIARRHEA: 0
SHORTNESS OF BREATH: 0
COLOR CHANGE: 0
VOMITING: 0
EYE DISCHARGE: 0
COUGH: 0
EYE ITCHING: 0
NAUSEA: 0
SORE THROAT: 0

## 2023-08-30 NOTE — PROGRESS NOTES
[facility] - Progress Note    Today's Date and Time: 8/30/2023, 9:17 AM    Impression :   Altered mentation-better  Recent hx MRSA UTI on 7/27/23-Treated with Macrobid  Urinary retention  Hypokalemia  GERD  Anxiety  Parkinson's    Recommendations:   Monitor off antibiotics   D/C IV Vancomycin- No significant growth on urine culture from 8/10/23  Urology recommendations  Stable for D/C from ID perspective    Medical Decision Making/Summary/Discussion:8/30/2023       Infection Control Recommendations   [place] Precautions  Contact Precautions for MRSA hx    Antimicrobial Stewardship Recommendations     Discontinuation of therapy  Coordination of Outpatient Care:   Estimated Length of IV antimicrobials:NA  Patient will need Midline Catheter Insertion: No  Patient will need PICC line Insertion:No  Patient will need: Home IV , [address] [facility],  SNF,  LTAC: No  Patient will need outpatient wound care: No    Chief complaint/reason for consultation:   MRSA UTI      History of Present Illness:   Rosario Reina is a 80y.o.-year-old  female who was initially admitted on 8/10/2023. Patient seen at the request of Dr. Emelia Alas:    This patient, who sees Dr. Tray Fernández, with Urology, for urinary retention, and was treated for a MRSA UTI with Macrobid at the end of July 2023, presented to the ED from her nursing home with confusion x 24 hrs on 8/10/23. She has a history of anxiety that is treated with Klonopin. Her right pupil was noted to be dilated. A CT of her head was negative for any acute abnormalities. Labwork was mostly unremarkable other than hypokalemia and slightly elevated creatinine. The patient was given fluids and was administered a dose of IV Rocephin and was initiated on IV Vancomycin. Blood cultures have yielded no growth thus far and the urine culture shows no significant growth.      Urology was consulted and a CT abd/pelvis was

## 2023-08-30 NOTE — PROGRESS NOTES
eyelid droop  Right pupillary defect   Cardiovascular:      Rate and Rhythm: Normal rate and regular rhythm. Pulses: Normal pulses. Heart sounds: Normal heart sounds. No murmur heard. Pulmonary:      Effort: Pulmonary effort is normal. No respiratory distress. Breath sounds: Normal breath sounds. Abdominal:      General: Bowel sounds are normal.      Palpations: Abdomen is soft. Tenderness: There is no abdominal tenderness. Skin:     General: Skin is warm. Coloration: Skin is not pale. Neurological:      Mental Status: She is alert. Mental status is at baseline.    Psychiatric:         Mood and Affect: Mood normal.         Medications:  Scheduled Medications:    potassium bicarb-citric acid  40 mEq Oral Once    potassium bicarb-citric acid  40 mEq Oral Once    mirtazapine  7.5 mg Oral Nightly    clonazePAM  0.25 mg Oral BID    propranolol  20 mg Oral BID    atorvastatin  20 mg Oral Daily    sennosides-docusate sodium  2 tablet Oral Daily    busPIRone  15 mg Oral TID    DULoxetine  60 mg Oral BID    sodium chloride flush  5-40 mL IntraVENous 2 times per day    enoxaparin  40 mg SubCUTAneous Daily    QUEtiapine  25 mg Oral Nightly     Continuous Infusions:    dextrose      sodium chloride 10 mL/hr at 08/12/23 1458     PRN Medicationslabetalol, 10 mg, Q4H PRN  glucose, 4 tablet, PRN  dextrose bolus, 125 mL, PRN   Or  dextrose bolus, 250 mL, PRN  glucagon (rDNA), 1 mg, PRN  dextrose, , Continuous PRN  haloperidol lactate, 5 mg, Q6H PRN  sodium chloride flush, 5-40 mL, PRN  sodium chloride, , PRN  ondansetron, 4 mg, Q8H PRN   Or  ondansetron, 4 mg, Q6H PRN  polyethylene glycol, 17 g, Daily PRN  acetaminophen, 650 mg, Q6H PRN   Or  acetaminophen, 650 mg, Q6H PRN        Diagnostic Labs:  CBC:   Recent Labs     08/28/23  1059   WBC 11.2   RBC 3.71*   HGB 11.5*   HCT 35.3*   MCV 95.1   RDW 13.8        BMP:   Recent Labs     08/28/23  1059   *   K 3.2*   CL 95*   CO2 26   BUN 10 CREATININE 0.6     BNP: No results for input(s): BNP in the last 72 hours. PT/INR: No results for input(s): PROTIME, INR in the last 72 hours. APTT: No results for input(s): APTT in the last 72 hours. CARDIAC ENZYMES: No results for input(s): CKMB, CKMBINDEX, TROPONINI in the last 72 hours. Invalid input(s): CKTOTAL;3  FASTING LIPID PANEL:  Lab Results   Component Value Date    CHOL 150 12/22/2022    HDL 57 12/22/2022    TRIG 64 12/22/2022     LIVER PROFILE: No results for input(s): AST, ALT, ALB, BILIDIR, BILITOT, ALKPHOS in the last 72 hours. MICROBIOLOGY:   Lab Results   Component Value Date/Time    CULTURE NO SIGNIFICANT GROWTH 08/10/2023 12:22 PM       Imaging:    CT HEAD WO CONTRAST    Result Date: 8/25/2023  No acute intracranial abnormality. Findings were sent to Dr. Juarez Pelaez at 10:21 am on 8/25/2023. ASSESSMENT & PLAN     ASSESSMENT / PLAN:     This is a 80 y.o. female who presented with altered mental status and found to have abnormal UA. Principal Problem:    Delirium  Active Problems:    OUSMANE (generalized anxiety disorder)    OLIVA (acute kidney injury) ([address])    Urinary incontinence without sensory awareness    Altered mental status    Dehydration    MRSA infection    Hypokalemia    Parkinson's disease ([address])    Confusion    Urinary retention    Catheter-associated urinary tract infection ([address])    Severe protein-calorie malnutrition (HCC)    Acute metabolic encephalopathy  Resolved Problems:    * No resolved hospital problems. *         Acute Delirium   - Probable secondary to dementia   -Klonopin reduced to  0.25 mg   -Avoid Benzodiazepines  -Seroquel can be utilized as an emergency medication if there is increased agitation.  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      OUSMANE  -Taking following medications :

## 2023-08-30 NOTE — PROGRESS NOTES
Physical Therapy  Facility/Department: [facility] RENAL//MED SURG  Physical Therapy Daily Treatment Note    Name: Aurea Paz  : 1940  MRN: [id number]  Date of Service: 2023    Discharge Recommendations:  Patient would benefit from continued therapy after discharge   PT Equipment Recommendations  Other: pt unsafe to attempt any aspect of mobility without significant physical assistance      Patient Diagnosis(es): The primary encounter diagnosis was Septicemia ([address] Diagnoses of Altered mental status, unspecified altered mental status type, Acute cystitis without hematuria, and OUSMANE (generalized anxiety disorder) were also pertinent to this visit. Past Medical History:  has a past medical history of OLIVA (acute kidney injury) ([address]), Anxiety and depression, Depression, GERD (gastroesophageal reflux disease), and Weight loss. Past Surgical History:  has a past surgical history that includes Spine surgery; Tonsillectomy; Colonoscopy (2011); eye surgery; Upper gastrointestinal endoscopy (2014); Colonoscopy (2014); Endoscopy, colon, diagnostic; Coccyx removal (1950); and Breast biopsy (Right, ). Assessment   Body Structures, Functions, Activity Limitations Requiring Skilled Therapeutic Intervention: Decreased functional mobility ; Increased pain;Decreased posture;Decreased strength;Decreased safe awareness;Decreased cognition;Decreased coordination;Decreased balance  Assessment: Pt ambulated 20ft x2 with RW and Eduardo, pt demonstrates significant cognitive deficits and would be unsafe to perform functional mobility unassisted. Recommending continued PT to address deficits and maximize safety and independence with mobility.   Therapy Prognosis: Good  Requires PT Follow-Up: Yes  Activity Tolerance  Activity Tolerance: Patient limited by fatigue;Treatment limited secondary to decreased cognition     Plan   Physcial Therapy Plan  General Plan:  (5-6x/week)  Current Treatment Recommendations: Home exercise program, Gait training, Functional mobility training, Transfer training, Balance training, Strengthening, ROM, Pain management, Patient/Caregiver education & training, Safety education & training, Therapeutic activities, Positioning  Safety Devices  Type of Devices: Gait belt, Left in bed, Call light within reach, Patient at risk for falls, Bed alarm in place, All fall risk precautions in place, Nurse notified  Restraints  Restraints Initially in Place: No     Restrictions  Restrictions/Precautions  Restrictions/Precautions: Up as Tolerated, Bed Alarm, Contact Precautions, Other (comment)  Required Braces or Orthoses?: No  Position Activity Restriction  Other position/activity restrictions: Recent tachycardia     Subjective   General  Patient assessed for rehabilitation services?: Yes  Response To Previous Treatment: Patient with no complaints from previous session. Family / Caregiver Present: No  Follows Commands: Impaired  General Comment  Comments: Pt returned to supine in bed at end of session with call light in reach. Subjective  Subjective: Pt and RN agreeable to PT this afternoon. Pt supine in bed upon arrival with no c/o pain. Pt pleasant and cooperative throughout session. Cognition   Orientation  Overall Orientation Status: Impaired  Orientation Level: Oriented to place;Oriented to time;Oriented to person;Disoriented to situation  Cognition  Overall Cognitive Status: Exceptions  Arousal/Alertness: Appropriate responses to stimuli  Following Commands: Follows multistep commands with repitition; Follows multistep commands with increased time; Inconsistently follows commands  Attention Span: Attends with cues to redirect; Difficulty attending to directions  Memory: Decreased recall of precautions;Decreased recall of biographical Information  Safety Judgement: Decreased awareness of need for assistance;Decreased awareness of need for safety  Problem Solving: Decreased

## 2023-08-30 NOTE — CARE COORDINATION
Transitional planning    Spoke with Jessica Nielsen from [facility]. She said they are still awaiting nonpar agreement from pt's insurance, [employer] was submitted already. They are just waiting hearing back. [phone] spoke with Jessica Nielsen from [facility]. No word on auth yet.

## 2023-08-30 NOTE — PLAN OF CARE
Problem: Skin/Tissue Integrity  Goal: Absence of new skin breakdown  Description: 1. Monitor for areas of redness and/or skin breakdown  2. Assess vascular access sites hourly  3. Every 4-6 hours minimum:  Change oxygen saturation probe site  4. Every 4-6 hours:  If on nasal continuous positive airway pressure, respiratory therapy assess nares and determine need for appliance change or resting period.   8/30/2023 0317 by Rose Marie Pal RN  Outcome: Progressing

## 2023-08-31 PROCEDURE — 6370000000 HC RX 637 (ALT 250 FOR IP): Performed by: INTERNAL MEDICINE

## 2023-08-31 PROCEDURE — 6370000000 HC RX 637 (ALT 250 FOR IP)

## 2023-08-31 PROCEDURE — 6360000002 HC RX W HCPCS

## 2023-08-31 PROCEDURE — 99231 SBSQ HOSP IP/OBS SF/LOW 25: CPT | Performed by: STUDENT IN AN ORGANIZED HEALTH CARE EDUCATION/TRAINING PROGRAM

## 2023-08-31 PROCEDURE — 99232 SBSQ HOSP IP/OBS MODERATE 35: CPT | Performed by: INTERNAL MEDICINE

## 2023-08-31 PROCEDURE — 2580000003 HC RX 258

## 2023-08-31 PROCEDURE — 1200000000 HC SEMI PRIVATE

## 2023-08-31 RX ADMIN — PROPRANOLOL HYDROCHLORIDE 20 MG: 20 TABLET ORAL at 10:01

## 2023-08-31 RX ADMIN — CLONAZEPAM 0.25 MG: 0.5 TABLET ORAL at 20:42

## 2023-08-31 RX ADMIN — PROPRANOLOL HYDROCHLORIDE 20 MG: 20 TABLET ORAL at 20:42

## 2023-08-31 RX ADMIN — MIRTAZAPINE 7.5 MG: 15 TABLET, ORALLY DISINTEGRATING ORAL at 20:41

## 2023-08-31 RX ADMIN — BUSPIRONE HYDROCHLORIDE 15 MG: 15 TABLET ORAL at 15:27

## 2023-08-31 RX ADMIN — DULOXETINE HYDROCHLORIDE 60 MG: 30 CAPSULE, DELAYED RELEASE ORAL at 20:41

## 2023-08-31 RX ADMIN — CLONAZEPAM 0.25 MG: 0.5 TABLET ORAL at 10:00

## 2023-08-31 RX ADMIN — BUSPIRONE HYDROCHLORIDE 15 MG: 15 TABLET ORAL at 10:00

## 2023-08-31 RX ADMIN — SODIUM CHLORIDE, PRESERVATIVE FREE 10 ML: 5 INJECTION INTRAVENOUS at 20:40

## 2023-08-31 RX ADMIN — QUETIAPINE FUMARATE 25 MG: 25 TABLET ORAL at 20:40

## 2023-08-31 RX ADMIN — BUSPIRONE HYDROCHLORIDE 15 MG: 15 TABLET ORAL at 20:40

## 2023-08-31 RX ADMIN — DULOXETINE HYDROCHLORIDE 60 MG: 30 CAPSULE, DELAYED RELEASE ORAL at 10:00

## 2023-08-31 RX ADMIN — ENOXAPARIN SODIUM 40 MG: 100 INJECTION SUBCUTANEOUS at 10:00

## 2023-08-31 ASSESSMENT — ENCOUNTER SYMPTOMS
EYE DISCHARGE: 0
SHORTNESS OF BREATH: 0
COUGH: 0
DIARRHEA: 0
NAUSEA: 0
COLOR CHANGE: 0
EYE ITCHING: 0
RHINORRHEA: 0
VOMITING: 0

## 2023-08-31 ASSESSMENT — PAIN SCALES - GENERAL
PAINLEVEL_OUTOF10: 0
PAINLEVEL_OUTOF10: 0

## 2023-08-31 NOTE — PROGRESS NOTES
Comprehensive Nutrition Assessment    Type and Reason for Visit:  Reassess    Nutrition Recommendations/Plan:   Continue Ensures. Encourage PO intake as tolerated. Malnutrition Assessment:  Malnutrition Status:  Severe malnutrition (08/12/23 1447)    Context:  Chronic Illness     Findings of the 6 clinical characteristics of malnutrition:  Energy Intake:  75% or less estimated energy requirements for 1 month or longer  Weight Loss:  Greater than 20% over 1 year     Body Fat Loss:  Mild body fat loss Orbital   Muscle Mass Loss:  Mild muscle mass loss Hand (interosseous)  Fluid Accumulation:  No significant fluid accumulation     Strength:  Not Performed    Nutrition Assessment:    Spoke with RN who reports pt eating bites of meals and drinking ~1 Ensure per day. States pt shows interest in drinking Ensures. Remeron ordered 8/29. Nutrition Related Findings:    labs/meds reviewed Wound Type: None       Current Nutrition Intake & Therapies:    Average Meal Intake: 1-25%  Average Supplements Intake:  (one per day reported by RN)  ADULT ORAL NUTRITION SUPPLEMENT; Lunch, Dinner; Standard High Calorie/High Protein Oral Supplement  ADULT DIET; Easy to Chew; Safety Tray; Safety Tray (Disposables)    Anthropometric Measures:  Height: 5' 4\" (162.6 cm)  Ideal Body Weight (IBW): 120 lbs (55 kg)    Admission Body Weight: 114 lb 13.8 oz (52.1 kg) (8/12)  Current Body Weight: 113 lb 8.6 oz (51.5 kg), 94.6 % IBW. Weight Source: Bed Scale  Current BMI (kg/m2): 19.5  Usual Body Weight: 146 lb 6.4 oz (66.4 kg) (12/13/22)  % Weight Change (Calculated): -21.5  Weight Adjustment For: No Adjustment                 BMI Categories: Normal Weight (BMI 18.5-24. 9)    Estimated Daily Nutrient Needs:  Energy Requirements Based On: Kcal/kg  Weight Used for Energy Requirements: Admission  Energy (kcal/day): 1500 to 1600 kcal.day  Weight Used for Protein Requirements: Admission  Protein (g/day): 60 to 70 g/day     Fluid (ml/day): per MD    Nutrition Diagnosis:   Severe malnutrition related to  (anxiety, poor appetite) as evidenced by Criteria as identified in malnutrition assessment    Nutrition Interventions:   Food and/or Nutrient Delivery: Continue Current Diet, Continue Oral Nutrition Supplement  Nutrition Education/Counseling: No recommendation at this time  Coordination of Nutrition Care: Continue to monitor while inpatient  Plan of Care discussed with: Medical team    Goals:  Previous Goal Met: Progressing toward Goal(s)  Goals: Meet at least 75% of estimated needs, prior to discharge       Nutrition Monitoring and Evaluation:   Behavioral-Environmental Outcomes: None Identified  Food/Nutrient Intake Outcomes: Food and Nutrient Intake, Supplement Intake  Physical Signs/Symptoms Outcomes: Weight, Biochemical Data, Nutrition Focused Physical Findings, Meal Time Behavior    Discharge Planning:     Too soon to determine     Phyllis Alvarez MS, RD, LD  Contact: [phone]

## 2023-08-31 NOTE — CARE COORDINATION
Call to [facility] eva to verify status of precert, they have not called today but she will call and get back with us

## 2023-08-31 NOTE — PLAN OF CARE
Problem: Discharge Planning  Goal: Discharge to home or other facility with appropriate resources  Outcome: Progressing     Problem: Skin/Tissue Integrity  Goal: Absence of new skin breakdown  Description: 1. Monitor for areas of redness and/or skin breakdown  2. Assess vascular access sites hourly  3. Every 4-6 hours minimum:  Change oxygen saturation probe site  4. Every 4-6 hours:  If on nasal continuous positive airway pressure, respiratory therapy assess nares and determine need for appliance change or resting period. Outcome: Progressing     Problem: Safety - Adult  Goal: Free from fall injury  Outcome: Progressing     Problem: ABCDS Injury Assessment  Goal: Absence of physical injury  Outcome: Progressing     Problem: Confusion  Goal: Confusion, delirium, dementia, or psychosis is improved or at baseline  Description: INTERVENTIONS:  1. Assess for possible contributors to thought disturbance, including medications, impaired vision or hearing, underlying metabolic abnormalities, dehydration, psychiatric diagnoses, and notify attending LIP  2. [place] high risk fall precautions, as indicated  3. Provide frequent short contacts to provide reality reorientation, refocusing and direction  4. Decrease environmental stimuli, including noise as appropriate  5. Monitor and intervene to maintain adequate nutrition, hydration, elimination, sleep and activity  6. If unable to ensure safety without constant attention obtain sitter and review sitter guidelines with assigned personnel  7.  Initiate Psychosocial CNS and Spiritual Care consult, as indicated  Outcome: Progressing     Problem: Nutrition Deficit:  Goal: Optimize nutritional status  Outcome: Progressing     Problem: Pain  Goal: Verbalizes/displays adequate comfort level or baseline comfort level  Outcome: Progressing

## 2023-08-31 NOTE — PROGRESS NOTES
[facility] [place] - Progress Note    Today's Date and Time: 8/31/2023, 10:13 AM    Impression :   Altered mentation-better  Recent hx MRSA UTI on 7/27/23-Treated with Macrobid  Urinary retention  Hypokalemia  GERD  Anxiety  Parkinson's    Recommendations:   Monitor off antibiotics   D/C IV Vancomycin- No significant growth on urine culture from 8/10/23  Urology recommendations  Stable for D/C from ID perspective    Medical Decision Making/Summary/Discussion:8/31/2023       Infection Control Recommendations   [place] Precautions  Contact Precautions for MRSA hx    Antimicrobial Stewardship Recommendations     Discontinuation of therapy  Coordination of Outpatient Care:   Estimated Length of IV antimicrobials:NA  Patient will need Midline Catheter Insertion: No  Patient will need PICC line Insertion:No  Patient will need: Home IV , [address] [facility],  SNF,  LTAC: No  Patient will need outpatient wound care: No    Chief complaint/reason for consultation:   MRSA UTI      History of Present Illness:   Jose Carlson is a 80y.o.-year-old  female who was initially admitted on 8/10/2023. Patient seen at the request of Dr. Pura Alvarez:    This patient, who sees Dr. Duane Gallardo, with Urology, for urinary retention, and was treated for a MRSA UTI with Macrobid at the end of July 2023, presented to the ED from her nursing home with confusion x 24 hrs on 8/10/23. She has a history of anxiety that is treated with Klonopin. Her right pupil was noted to be dilated. A CT of her head was negative for any acute abnormalities. Labwork was mostly unremarkable other than hypokalemia and slightly elevated creatinine. The patient was given fluids and was administered a dose of IV Rocephin and was initiated on IV Vancomycin. Blood cultures have yielded no growth thus far and the urine culture shows no significant growth.      Urology was consulted and a CT abd/pelvis 0.6 0.4*         Hepatic Function Panel:   No results for input(s): PROT, LABALBU, BILIDIR, IBILI, BILITOT, ALKPHOS, ALT, AST in the last 72 hours. No results for input(s): RPR in the last 72 hours. No results for input(s): HIV in the last 72 hours. No results for input(s): BC in the last 72 hours. Lab Results   Component Value Date/Time    MUCUS 1+ 08/10/2023 11:15 AM    RBC 3.45 08/30/2023 08:06 AM    TRICHOMONAS NOT REPORTED 09/12/2015 04:48 PM    WBC 5.4 08/30/2023 08:06 AM    YEAST NOT REPORTED 09/12/2015 04:48 PM    TURBIDITY Clear 08/10/2023 11:15 AM     Lab Results   Component Value Date/Time    CREATININE 0.4 08/30/2023 08:06 AM    GLUCOSE 92 08/30/2023 08:06 AM       Medical Decision Making-Imaging:   CT HEAD WO CONTRAST    Result Date: 8/10/2023  EXAMINATION: CT OF THE HEAD WITHOUT CONTRAST  8/10/2023 11:35 am TECHNIQUE: CT of the head was performed without the administration of intravenous contrast. Automated exposure control, iterative reconstruction, and/or weight based adjustment of the mA/kV was utilized to reduce the radiation dose to as low as reasonably achievable. COMPARISON: None. HISTORY: ORDERING SYSTEM PROVIDED HISTORY: AMS TECHNOLOGIST PROVIDED HISTORY: AMS Decision Support Exception - unselect if not a suspected or confirmed emergency medical condition->Emergency Medical Condition (MA) Reason for Exam: ams FINDINGS: BRAIN/VENTRICLES: There is no acute intracranial hemorrhage, mass effect or midline shift. No abnormal extra-axial fluid collection. The gray-white differentiation is maintained without evidence of an acute infarct. There is no evidence of hydrocephalus. Unchanged enlarged frontal dural spaces, mild cortical volume loss, and scattered low-attenuation white matter abnormalities. ORBITS: The visualized portion of the orbits demonstrate no acute abnormality. Status post right cataract surgery.  SINUSES: The visualized paranasal sinuses and mastoid air cells demonstrate no

## 2023-08-31 NOTE — CARE COORDINATION
Attempt to provide IMM pt is eating and states \"I can only do one thing at a time\"    1429 Call to Gianni Vazquez at [phone] is still pending    1615 Attempt to complete 2nd IMM pt is sleeping

## 2023-09-01 PROCEDURE — 51702 INSERT TEMP BLADDER CATH: CPT

## 2023-09-01 PROCEDURE — 99232 SBSQ HOSP IP/OBS MODERATE 35: CPT | Performed by: INTERNAL MEDICINE

## 2023-09-01 PROCEDURE — 6370000000 HC RX 637 (ALT 250 FOR IP): Performed by: INTERNAL MEDICINE

## 2023-09-01 PROCEDURE — 6370000000 HC RX 637 (ALT 250 FOR IP)

## 2023-09-01 PROCEDURE — 99232 SBSQ HOSP IP/OBS MODERATE 35: CPT | Performed by: STUDENT IN AN ORGANIZED HEALTH CARE EDUCATION/TRAINING PROGRAM

## 2023-09-01 PROCEDURE — 1200000000 HC SEMI PRIVATE

## 2023-09-01 PROCEDURE — 97530 THERAPEUTIC ACTIVITIES: CPT

## 2023-09-01 PROCEDURE — 2580000003 HC RX 258

## 2023-09-01 RX ORDER — MIRTAZAPINE 15 MG/1
15 TABLET, ORALLY DISINTEGRATING ORAL NIGHTLY
Status: DISCONTINUED | OUTPATIENT
Start: 2023-09-01 | End: 2023-09-08 | Stop reason: HOSPADM

## 2023-09-01 RX ADMIN — ATORVASTATIN CALCIUM 20 MG: 20 TABLET, FILM COATED ORAL at 08:17

## 2023-09-01 RX ADMIN — SODIUM CHLORIDE, PRESERVATIVE FREE 10 ML: 5 INJECTION INTRAVENOUS at 08:17

## 2023-09-01 RX ADMIN — BUSPIRONE HYDROCHLORIDE 15 MG: 15 TABLET ORAL at 08:10

## 2023-09-01 RX ADMIN — SODIUM CHLORIDE, PRESERVATIVE FREE 10 ML: 5 INJECTION INTRAVENOUS at 19:51

## 2023-09-01 RX ADMIN — DULOXETINE HYDROCHLORIDE 60 MG: 30 CAPSULE, DELAYED RELEASE ORAL at 08:17

## 2023-09-01 RX ADMIN — DULOXETINE HYDROCHLORIDE 60 MG: 30 CAPSULE, DELAYED RELEASE ORAL at 19:50

## 2023-09-01 RX ADMIN — BUSPIRONE HYDROCHLORIDE 15 MG: 15 TABLET ORAL at 15:58

## 2023-09-01 RX ADMIN — BUSPIRONE HYDROCHLORIDE 15 MG: 15 TABLET ORAL at 23:23

## 2023-09-01 RX ADMIN — PROPRANOLOL HYDROCHLORIDE 20 MG: 20 TABLET ORAL at 08:18

## 2023-09-01 RX ADMIN — CLONAZEPAM 0.25 MG: 0.5 TABLET ORAL at 23:23

## 2023-09-01 RX ADMIN — PROPRANOLOL HYDROCHLORIDE 20 MG: 20 TABLET ORAL at 21:20

## 2023-09-01 RX ADMIN — STANDARDIZED SENNA CONCENTRATE AND DOCUSATE SODIUM 2 TABLET: 8.6; 5 TABLET ORAL at 08:17

## 2023-09-01 RX ADMIN — CLONAZEPAM 0.25 MG: 0.5 TABLET ORAL at 08:09

## 2023-09-01 RX ADMIN — MIRTAZAPINE 15 MG: 15 TABLET, ORALLY DISINTEGRATING ORAL at 23:24

## 2023-09-01 RX ADMIN — QUETIAPINE FUMARATE 25 MG: 25 TABLET ORAL at 21:20

## 2023-09-01 RX ADMIN — SODIUM CHLORIDE, PRESERVATIVE FREE 10 ML: 5 INJECTION INTRAVENOUS at 00:22

## 2023-09-01 ASSESSMENT — ENCOUNTER SYMPTOMS
COLOR CHANGE: 0
NAUSEA: 0
RHINORRHEA: 0
SHORTNESS OF BREATH: 0
DIARRHEA: 0
COUGH: 0
EYE ITCHING: 0
VOMITING: 0
EYE DISCHARGE: 0

## 2023-09-01 ASSESSMENT — PAIN SCALES - GENERAL
PAINLEVEL_OUTOF10: 0
PAINLEVEL_OUTOF10: 0

## 2023-09-01 NOTE — PROGRESS NOTES
Nutrition Note    Spoke with RN who reports pt is very upset that she cannot get a hamburger with current easy to chew diet (d/t pt reported difficulty chewing). Pt now denies chewing difficulty. Will change diet to regular and monitor.     Electronically signed by Brendon Baez MS, RD, LD on 9/1/23 at 1:18 PM EDT    Contact: [phone]

## 2023-09-01 NOTE — PROGRESS NOTES
Occupational [address]  Occupational Therapy Not Seen Note    DATE: 2023    NAME: Rosario Reina  MRN: [id number]   : 1940      Patient not seen this date for Occupational Therapy due to:    Patient Declined: With explaining the benefits of therapy multiple times, pt continued to refuse saying multiple times, \"leave me alone. \"    Next Scheduled Treatment: 23    Electronically signed by OSWALD Guy on 2023 at 8:57 AM

## 2023-09-01 NOTE — PROGRESS NOTES
Physical Therapy  Facility/Department: [facility] RENAL//MED SURG  Physical Therapy Daily Treatment Note    Name: Rosario Reed  : 1940  MRN: [id number]  Date of Service: 2023    Discharge Recommendations:  Patient would benefit from continued therapy after discharge   PT Equipment Recommendations  Equipment Needed: No  Other: pt unsafe to attempt any aspect of mobility without significant physical assistance      Patient Diagnosis(es): The primary encounter diagnosis was Septicemia ([address]). Diagnoses of Altered mental status, unspecified altered mental status type, Acute cystitis without hematuria, and OUSMANE (generalized anxiety disorder) were also pertinent to this visit. Past Medical History:  has a past medical history of OLIVA (acute kidney injury) ([address]), Anxiety and depression, Depression, GERD (gastroesophageal reflux disease), and Weight loss. Past Surgical History:  has a past surgical history that includes Spine surgery; Tonsillectomy; Colonoscopy (2011); eye surgery; Upper gastrointestinal endoscopy (2014); Colonoscopy (2014); Endoscopy, colon, diagnostic; Coccyx removal (1950); and Breast biopsy (Right, ). Assessment   Body Structures, Functions, Activity Limitations Requiring Skilled Therapeutic Intervention: Decreased functional mobility ; Increased pain;Decreased posture;Decreased strength;Decreased safe awareness;Decreased cognition;Decreased coordination;Decreased balance;Decreased endurance  Assessment: Pt ambulated 25' with RW and Eduardo, pt demonstrates significant cognitive deficits and would be unsafe to perform functional mobility unassisted. Pt would benefit from continued acute PT to address deficits and improve mobility.   Therapy Prognosis: Good  Decision Making: Medium Complexity  Requires PT Follow-Up: Yes  Activity Tolerance  Activity Tolerance: Patient limited by fatigue;Treatment limited secondary to decreased cognition     Plan   Physcial Therapy directions  Memory: Decreased recall of precautions;Decreased recall of biographical Information  Safety Judgement: Decreased awareness of need for assistance;Decreased awareness of need for safety  Problem Solving: Decreased awareness of errors;Assistance required to identify errors made;Assistance required to correct errors made  Insights: Decreased awareness of deficits  Initiation: Requires cues for some  Sequencing: Requires cues for some  Cognition Comment: Pt perseverates on things, difficult to redirect once fixated regardless of education or reasurance. Objective   Bed mobility  Supine to Sit: Minimal assistance  Sit to Supine: Minimal assistance  Bed Mobility Comments: HOB elevated ~25 degrees. Pt pulls up on writer to assist w/ supine to sit. Pt requires assist at BLE in sit to supine. Transfers  Sit to Stand: Minimal Assistance  Stand to Sit: Minimal Assistance  Comment: Performed from bed w/ RW. Ambulation  Surface: Level tile  Device: Rolling Walker  Assistance: Minimal assistance  Quality of Gait: Pt amb w/ greatly reduced gait speed, moderate flexed trunk posture, inadequate terminal knee extension SHALA, no LOB. Distance: 22'  Comments: Further ambulation limited d/t fatigue. More Ambulation?: No     Balance  Posture: Fair  Sitting - Static: Fair;+  Sitting - Dynamic: Fair;+  Standing - Static: Fair  Standing - Dynamic: Fair;-  Exercise Treatment: Pt declined to perform LE exercises in sitting or supine. AM-PAC Score  AM-PAC Inpatient Mobility Raw Score : 17 (09/01/23 1445)  -PAC Inpatient T-Scale Score : 42.13 (09/01/23 1445)  Mobility Inpatient CMS 0-100% Score: 50.57 (09/01/23 1445)  Mobility Inpatient CMS G-Code Modifier : CK (09/01/23 1445)        Goals  Short Term Goals  Time Frame for Short Term Goals: 14 visits  Short Term Goal 1: Supine to/from sit with SBA. Short Term Goal 2: Sit to/from stand with SBA. Short Term Goal 3: Ambulate 48' with walker with CGA.

## 2023-09-01 NOTE — ADT AUTH CERT
Utilization Reviews          8/27 AND   8/31 2023 by Berta Davis RN       Review Status Review Entered   In Primary 9/1/2023 1052       Created By   Berta Davis RN      Criteria Review   DATE:   8/27.23          RELEVANT BASELINES:    Na      PERTINENT UPDATES:  [place] to person and place      VITALS:  99.1  99   6   131/84   sp0 294%  ra        ABNL/PERTINENT LABS/RADIOLOGY/DIAGNOSTIC STUDIES:  Na      PHYSICAL EXAM:   Head: Normocephalic. Nose: Nose normal.      Mouth/Throat:      Mouth: Mucous membranes are dry. Eyes:      Pupils: Pupils are equal, round, and reactive to light. Cardiovascular:      Rate and Rhythm: Normal rate and regular rhythm. Musculoskeletal:      Cervical back: Normal range of motion. Skin:     General: Skin is dry. Neurological:      Mental Status: She is alert. MD CONSULTS/ASSESSMENT AND PLAN:  MEDICINE   -No overnight acute events  -Patient is oriented to place and person  -Pt has foleys catheter in   - Currently she denies of suicidal ideation  - Psychiatry recommends discontinue the sitter and continue same medications     Principal Problem:   Acute Delirium  Active Problems:    Complicated UTI-resolved    OLIVA    H/O Drug-induced parkinsonism    Urinary retention     QTc prolongation    Severe malnutrition     Acute Delirium   - Probable secondary to dementia   -Klonopin reduced to  0.25 mg   -Avoid Benzodiazepines  -Seroquel can be utilized as an emergency medication if there is increased agitation. 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      OLIVA - Resolved  -Creatinine down

## 2023-09-01 NOTE — PLAN OF CARE
Problem: Discharge Planning  Goal: Discharge to home or other facility with appropriate resources  Outcome: Progressing     Problem: Skin/Tissue Integrity  Goal: Absence of new skin breakdown  Description: 1. Monitor for areas of redness and/or skin breakdown  2. Assess vascular access sites hourly  3. Every 4-6 hours minimum:  Change oxygen saturation probe site  4. Every 4-6 hours:  If on nasal continuous positive airway pressure, respiratory therapy assess nares and determine need for appliance change or resting period. Outcome: Progressing     Problem: Safety - Adult  Goal: Free from fall injury  Outcome: Progressing     Problem: ABCDS Injury Assessment  Goal: Absence of physical injury  Outcome: Progressing     Problem: Confusion  Goal: Confusion, delirium, dementia, or psychosis is improved or at baseline  Description: INTERVENTIONS:  1. Assess for possible contributors to thought disturbance, including medications, impaired vision or hearing, underlying metabolic abnormalities, dehydration, psychiatric diagnoses, and notify attending LIP  2. [place] high risk fall precautions, as indicated  3. Provide frequent short contacts to provide reality reorientation, refocusing and direction  4. Decrease environmental stimuli, including noise as appropriate  5. Monitor and intervene to maintain adequate nutrition, hydration, elimination, sleep and activity  6. If unable to ensure safety without constant attention obtain sitter and review sitter guidelines with assigned personnel  7.  Initiate Psychosocial CNS and Spiritual Care consult, as indicated  Outcome: Progressing     Problem: Nutrition Deficit:  Goal: Optimize nutritional status  Outcome: Progressing     Problem: Pain  Goal: Verbalizes/displays adequate comfort level or baseline comfort level  Outcome: Progressing

## 2023-09-01 NOTE — PLAN OF CARE
Problem: Discharge Planning  Goal: Discharge to home or other facility with appropriate resources  9/1/2023 1641 by Jennifer Ragland RN  Outcome: Progressing  Flowsheets (Taken 9/1/2023 0804)  Discharge to home or other facility with appropriate resources: Identify barriers to discharge with patient and caregiver  9/1/2023 0457 by Corine Hough RN  Outcome: Progressing     Problem: Skin/Tissue Integrity  Goal: Absence of new skin breakdown  Description: 1. Monitor for areas of redness and/or skin breakdown  2. Assess vascular access sites hourly  3. Every 4-6 hours minimum:  Change oxygen saturation probe site  4. Every 4-6 hours:  If on nasal continuous positive airway pressure, respiratory therapy assess nares and determine need for appliance change or resting period. 9/1/2023 1641 by Jennifer Ragland RN  Outcome: Progressing  9/1/2023 0457 by Corine Hough RN  Outcome: Progressing     Problem: Safety - Adult  Goal: Free from fall injury  9/1/2023 1641 by Jennifer Ragland RN  Outcome: Progressing  Flowsheets (Taken 9/1/2023 0733)  Free From Fall Injury: Instruct family/caregiver on patient safety  9/1/2023 0457 by Corine Hough RN  Outcome: Progressing     Problem: ABCDS Injury Assessment  Goal: Absence of physical injury  9/1/2023 1641 by Jennifer Ragland RN  Outcome: Progressing  9/1/2023 0457 by Corine Hough RN  Outcome: Progressing     Problem: Confusion  Goal: Confusion, delirium, dementia, or psychosis is improved or at baseline  Description: INTERVENTIONS:  1. Assess for possible contributors to thought disturbance, including medications, impaired vision or hearing, underlying metabolic abnormalities, dehydration, psychiatric diagnoses, and notify attending LIP  2. [place] high risk fall precautions, as indicated  3. Provide frequent short contacts to provide reality reorientation, refocusing and direction  4. Decrease environmental stimuli, including noise as appropriate  5. Monitor and intervene to maintain adequate nutrition, hydration, elimination, sleep and activity  6. If unable to ensure safety without constant attention obtain sitter and review sitter guidelines with assigned personnel  7.  Initiate Psychosocial CNS and Spiritual Care consult, as indicated  9/1/2023 1641 by Meeta Howard RN  Outcome: Progressing  9/1/2023 0457 by Dudley Webber RN  Outcome: Progressing     Problem: Nutrition Deficit:  Goal: Optimize nutritional status  9/1/2023 1641 by Meeta Howard RN  Outcome: Progressing  9/1/2023 0457 by Dudley Webber RN  Outcome: Progressing     Problem: Pain  Goal: Verbalizes/displays adequate comfort level or baseline comfort level  9/1/2023 1641 by Meeta Howard RN  Outcome: Progressing  9/1/2023 0457 by Dudley Webber RN  Outcome: Progressing

## 2023-09-01 NOTE — PROGRESS NOTES
Physical Therapy        Physical Therapy Cancel Note      DATE: 2023    NAME: Jose Strong  MRN: [id number]   : 1940      Patient not seen this date for Physical Therapy due to:    Patient Declined: Pt stating \"I just can't do this right now. My nerves. \"  Attempted to encourage pt to participate, but pt continued to refuse. RN notified. Plan to check back as able.        Electronically signed by Namita Teran PTA on 2023 at 8:42 AM

## 2023-09-01 NOTE — PROGRESS NOTES
[facility] - Progress Note    Today's Date and Time: 9/1/2023, 10:57 AM    Impression :   Altered mentation-better  Recent hx MRSA UTI on 7/27/23-  Treated with Macrobid  Urinary retention  Hypokalemia  GERD  Anxiety  Parkinson's    Recommendations:   Monitor off antibiotics   D/C IV Vancomycin- No significant growth on urine culture from 8/10/23  Urology recommendations  Stable for D/C from ID perspective    Medical Decision Making/Summary/Discussion:9/1/2023       Infection Control Recommendations   [place] Precautions  Contact Precautions for MRSA hx    Antimicrobial Stewardship Recommendations     Discontinuation of therapy  Coordination of Outpatient Care:   Estimated Length of IV antimicrobials:NA  Patient will need Midline Catheter Insertion: No  Patient will need PICC line Insertion:No  Patient will need: Home IV , [address] [facility],  SNF,  LTAC: No  Patient will need outpatient wound care: No    Chief complaint/reason for consultation:   MRSA UTI      History of Present Illness:   Rosario Reina is a 80y.o.-year-old  female who was initially admitted on 8/10/2023. Patient seen at the request of Dr. Emelia Alas:    This patient, who sees Dr. Tray Fernández, with Urology, for urinary retention, and was treated for a MRSA UTI with Macrobid at the end of July 2023, presented to the ED from her nursing home with confusion x 24 hrs on 8/10/23. She has a history of anxiety that is treated with Klonopin. Her right pupil was noted to be dilated. A CT of her head was negative for any acute abnormalities. Labwork was mostly unremarkable other than hypokalemia and slightly elevated creatinine. The patient was given fluids and was administered a dose of IV Rocephin and was initiated on IV Vancomycin. Blood cultures have yielded no growth thus far and the urine culture shows no significant growth.      Urology was consulted and a CT abd/pelvis droop     FINDINGS:  BRAIN/VENTRICLES: There is no acute intracranial hemorrhage, mass effect, or  midline shift. There is satisfactory overall gray-white matter  differentiation. The ventricular structures are symmetric and unremarkable. The infratentorial structures are unremarkable. ORBITS: The visualized portion of the orbits demonstrate no acute abnormality. SINUSES: The visualized paranasal sinuses and mastoid air cells demonstrate  no acute abnormality. SOFT TISSUES/SKULL:  No acute abnormality of the visualized skull or soft  tissues. IMPRESSION:  No acute intracranial abnormality. CXR   8/10/23          Discussed diagnosis, planned studies and treatment plans with patient, RN, IM. I have personally reviewed the past medical history, past surgical history, medications, social history, and family history, and I have updated the database accordingly.   Past Medical History:     Past Medical History:   Diagnosis Date    OLIVA (acute kidney injury) ([address]) 7/8/2023    Anxiety and depression     Depression     GERD (gastroesophageal reflux disease)     Weight loss        Past Surgical  History:     Past Surgical History:   Procedure Laterality Date    BREAST BIOPSY Right 2010    COCCYX REMOVAL  09/01/1950    COLONOSCOPY  01/01/2011    COLONOSCOPY  04/25/2014    ENDOSCOPY, COLON, DIAGNOSTIC      EYE SURGERY      injury to eye    SPINE SURGERY      tailbone removed as teenager    TONSILLECTOMY      UPPER GASTROINTESTINAL ENDOSCOPY  04/25/2014       Medications:      mirtazapine  15 mg Oral Nightly    potassium bicarb-citric acid  40 mEq Oral Once    potassium bicarb-citric acid  40 mEq Oral Once    clonazePAM  0.25 mg Oral BID    propranolol  20 mg Oral BID    atorvastatin  20 mg Oral Daily    sennosides-docusate sodium  2 tablet Oral Daily    busPIRone  15 mg Oral TID    DULoxetine  60 mg Oral BID    sodium chloride flush  5-40 mL IntraVENous 2 times per day    enoxaparin  40 mg SubCUTAneous BACTERIAL SUSCEPTIBILITY PANEL SIDDHARTH Final    trimethoprim-sulfamethoxazole Sensitive <=10 BACTERIAL SUSCEPTIBILITY PANEL SIDDHARTH Final    vancomycin Sensitive 1 BACTERIAL SUSCEPTIBILITY PANEL SIDDHARTH Final                 Medical Decision Making-Other:     Note:    Thank you for allowing us to participate in the care of this patient. Please call with questions. Lupillo Christianson MD      Elements of Medical Decision Making:  Note: I have independently performed the steps listed below as part of the medical decision making and evaluation. Examined and discussed with patient. Labs, medications, radiologic studies were reviewed with personal review of films  Large amounts of data were reviewed  Discussed with nursing Staff, Discharge planner  Infection Control and Prevention measures reviewed  All prior entries were reviewed  Administer medications as ordered  Prognosis: Guarded  Discharge planning reviewed  Follow up as outpatient.     Clarisse Durán MD.

## 2023-09-01 NOTE — CARE COORDINATION
Spoke with Linda Villalba at [facility] to inquire on precert. Linda Villalba relates insurance originally told her that it can take up to 2 weeks for determination, now stating up to 30 days. Met with patient and family to discuss this.   I provided them a list of in network facilities from [facility], they will provide additional choices    [phone] spoke with patient's son Temi Browne, who relates alternate choices are [facility], [facility], referrals faxed    2171 spoke with Danie Chirinos at [facility], they can accept, need updated PT/OT for precert

## 2023-09-02 PROBLEM — N17.9 AKI (ACUTE KIDNEY INJURY) (HCC): Status: RESOLVED | Noted: 2023-07-08 | Resolved: 2023-09-02

## 2023-09-02 PROBLEM — E86.0 DEHYDRATION: Status: RESOLVED | Noted: 2023-08-11 | Resolved: 2023-09-02

## 2023-09-02 PROBLEM — R33.9 URINARY RETENTION: Status: RESOLVED | Noted: 2023-08-11 | Resolved: 2023-09-02

## 2023-09-02 PROBLEM — A49.02 MRSA INFECTION: Status: RESOLVED | Noted: 2023-08-11 | Resolved: 2023-09-02

## 2023-09-02 PROBLEM — G93.41 ACUTE METABOLIC ENCEPHALOPATHY: Status: RESOLVED | Noted: 2023-08-12 | Resolved: 2023-09-02

## 2023-09-02 PROBLEM — T83.511A CATHETER-ASSOCIATED URINARY TRACT INFECTION (HCC): Status: RESOLVED | Noted: 2023-08-11 | Resolved: 2023-09-02

## 2023-09-02 PROBLEM — N39.0 CATHETER-ASSOCIATED URINARY TRACT INFECTION (HCC): Status: RESOLVED | Noted: 2023-08-11 | Resolved: 2023-09-02

## 2023-09-02 LAB
ANION GAP SERPL CALCULATED.3IONS-SCNC: 10 MMOL/L (ref 9–17)
BASOPHILS # BLD: 0.03 K/UL (ref 0–0.2)
BASOPHILS NFR BLD: 1 % (ref 0–2)
BUN SERPL-MCNC: 14 MG/DL (ref 8–23)
CALCIUM SERPL-MCNC: 10 MG/DL (ref 8.6–10.4)
CHLORIDE SERPL-SCNC: 104 MMOL/L (ref 98–107)
CO2 SERPL-SCNC: 27 MMOL/L (ref 20–31)
CREAT SERPL-MCNC: 0.5 MG/DL (ref 0.5–0.9)
EOSINOPHIL # BLD: 0.05 K/UL (ref 0–0.44)
EOSINOPHILS RELATIVE PERCENT: 1 % (ref 1–4)
ERYTHROCYTE [DISTWIDTH] IN BLOOD BY AUTOMATED COUNT: 13.8 % (ref 11.8–14.4)
GFR SERPL CREATININE-BSD FRML MDRD: >60 ML/MIN/1.73M2
GLUCOSE SERPL-MCNC: 90 MG/DL (ref 70–99)
HCT VFR BLD AUTO: 33.8 % (ref 36.3–47.1)
HGB BLD-MCNC: 10.6 G/DL (ref 11.9–15.1)
IMM GRANULOCYTES # BLD AUTO: <0.03 K/UL (ref 0–0.3)
IMM GRANULOCYTES NFR BLD: 0 %
LYMPHOCYTES NFR BLD: 2.55 K/UL (ref 1.1–3.7)
LYMPHOCYTES RELATIVE PERCENT: 46 % (ref 24–43)
MCH RBC QN AUTO: 31.1 PG (ref 25.2–33.5)
MCHC RBC AUTO-ENTMCNC: 31.4 G/DL (ref 28.4–34.8)
MCV RBC AUTO: 99.1 FL (ref 82.6–102.9)
MONOCYTES NFR BLD: 0.51 K/UL (ref 0.1–1.2)
MONOCYTES NFR BLD: 10 % (ref 3–12)
NEUTROPHILS NFR BLD: 42 % (ref 36–65)
NEUTS SEG NFR BLD: 2.24 K/UL (ref 1.5–8.1)
NRBC BLD-RTO: 0 PER 100 WBC
PLATELET # BLD AUTO: 277 K/UL (ref 138–453)
PMV BLD AUTO: 9.6 FL (ref 8.1–13.5)
POTASSIUM SERPL-SCNC: 3.8 MMOL/L (ref 3.7–5.3)
RBC # BLD AUTO: 3.41 M/UL (ref 3.95–5.11)
SODIUM SERPL-SCNC: 141 MMOL/L (ref 135–144)
WBC OTHER # BLD: 5.4 K/UL (ref 3.5–11.3)

## 2023-09-02 PROCEDURE — 36415 COLL VENOUS BLD VENIPUNCTURE: CPT

## 2023-09-02 PROCEDURE — 6370000000 HC RX 637 (ALT 250 FOR IP)

## 2023-09-02 PROCEDURE — 99232 SBSQ HOSP IP/OBS MODERATE 35: CPT | Performed by: INTERNAL MEDICINE

## 2023-09-02 PROCEDURE — 2580000003 HC RX 258

## 2023-09-02 PROCEDURE — 6370000000 HC RX 637 (ALT 250 FOR IP): Performed by: INTERNAL MEDICINE

## 2023-09-02 PROCEDURE — 85025 COMPLETE CBC W/AUTO DIFF WBC: CPT

## 2023-09-02 PROCEDURE — 94760 N-INVAS EAR/PLS OXIMETRY 1: CPT

## 2023-09-02 PROCEDURE — 51702 INSERT TEMP BLADDER CATH: CPT

## 2023-09-02 PROCEDURE — 6360000002 HC RX W HCPCS

## 2023-09-02 PROCEDURE — 1200000000 HC SEMI PRIVATE

## 2023-09-02 PROCEDURE — 80048 BASIC METABOLIC PNL TOTAL CA: CPT

## 2023-09-02 PROCEDURE — 97535 SELF CARE MNGMENT TRAINING: CPT

## 2023-09-02 RX ADMIN — SODIUM CHLORIDE, PRESERVATIVE FREE 10 ML: 5 INJECTION INTRAVENOUS at 08:35

## 2023-09-02 RX ADMIN — DULOXETINE HYDROCHLORIDE 60 MG: 30 CAPSULE, DELAYED RELEASE ORAL at 08:31

## 2023-09-02 RX ADMIN — HALOPERIDOL LACTATE 5 MG: 5 INJECTION, SOLUTION INTRAMUSCULAR at 00:00

## 2023-09-02 RX ADMIN — PROPRANOLOL HYDROCHLORIDE 20 MG: 20 TABLET ORAL at 08:30

## 2023-09-02 RX ADMIN — BUSPIRONE HYDROCHLORIDE 15 MG: 15 TABLET ORAL at 08:31

## 2023-09-02 RX ADMIN — CLONAZEPAM 0.25 MG: 0.5 TABLET ORAL at 08:28

## 2023-09-02 RX ADMIN — STANDARDIZED SENNA CONCENTRATE AND DOCUSATE SODIUM 2 TABLET: 8.6; 5 TABLET ORAL at 08:31

## 2023-09-02 RX ADMIN — BUSPIRONE HYDROCHLORIDE 15 MG: 15 TABLET ORAL at 15:23

## 2023-09-02 RX ADMIN — ATORVASTATIN CALCIUM 20 MG: 20 TABLET, FILM COATED ORAL at 08:31

## 2023-09-02 ASSESSMENT — ENCOUNTER SYMPTOMS
DIARRHEA: 0
VOMITING: 0
EYE ITCHING: 0
COUGH: 0
COLOR CHANGE: 0
RHINORRHEA: 0
SHORTNESS OF BREATH: 0
EYE DISCHARGE: 0
NAUSEA: 0

## 2023-09-02 NOTE — PROGRESS NOTES
[facility]  Internal Medicine Teaching Residency Program  Inpatient Daily Progress Note  ______________________________________________________________________________    Patient: Rajan Velarde  YOB: 1940   QFB:[id number]    Acct: [de-identified]     Room: [address] [place] [address]  Admit date: 8/10/2023  Today's date: 09/02/23  Number of days in the hospital: 23    SUBJECTIVE   Admitting Diagnosis: Delirium  CC: Altered Mental Status    -Pt seen and examined at bedside. Chart & results reviewed. -Patient was lying comfortably in bed  -Patient is afebrile and hemodynamically stable  -Patient seems to be slightly tachypneic and anxious, as baseline  -Patient is still awaiting pre-CERT approval for SNF placement. Review of Systems   Constitutional:  Positive for activity change. Negative for fatigue. HENT:  Negative for congestion and rhinorrhea. Eyes:  Negative for discharge and itching. Respiratory:  Negative for cough and shortness of breath. Cardiovascular:  Negative for chest pain. Gastrointestinal:  Negative for diarrhea, nausea and vomiting. Genitourinary:  Negative for flank pain. Croft catheter in place   Skin:  Negative for color change. Neurological:  Negative for facial asymmetry (Right-sided eyelid droop) and headaches. Psychiatric/Behavioral:  Positive for agitation. Negative for behavioral problems and confusion. BRIEF HISTORY     The patient is a 80 y.o. female with past medical history of generalized anxiety disorder, parkinsonism, colitis, acute urinary retention, OLIVA presented  with altered mental status. As per her son the patient is being disoriented for more than 24 hours in the nursing facility. As per the medical record she was admitted in the hospital on 7/2/2023 for the management of generalized anxiety disorder.   The patient had repeated episodes of anxiety and tachycardia throughout her hospital stay

## 2023-09-02 NOTE — PROGRESS NOTES
input(s): PROT, LABALBU, BILIDIR, IBILI, BILITOT, ALKPHOS, ALT, AST in the last 72 hours. No results for input(s): RPR in the last 72 hours. No results for input(s): HIV in the last 72 hours. No results for input(s): BC in the last 72 hours. Lab Results   Component Value Date/Time    MUCUS 1+ 08/10/2023 11:15 AM    RBC 3.41 09/02/2023 06:55 AM    TRICHOMONAS NOT REPORTED 09/12/2015 04:48 PM    WBC 5.4 09/02/2023 06:55 AM    YEAST NOT REPORTED 09/12/2015 04:48 PM    TURBIDITY Clear 08/10/2023 11:15 AM     Lab Results   Component Value Date/Time    CREATININE 0.5 09/02/2023 06:55 AM    GLUCOSE 90 09/02/2023 06:55 AM       Medical Decision Making-Imaging:   CT HEAD WO CONTRAST    Result Date: 8/10/2023  EXAMINATION: CT OF THE HEAD WITHOUT CONTRAST  8/10/2023 11:35 am TECHNIQUE: CT of the head was performed without the administration of intravenous contrast. Automated exposure control, iterative reconstruction, and/or weight based adjustment of the mA/kV was utilized to reduce the radiation dose to as low as reasonably achievable. COMPARISON: None. HISTORY: ORDERING SYSTEM PROVIDED HISTORY: AMS TECHNOLOGIST PROVIDED HISTORY: AMS Decision Support Exception - unselect if not a suspected or confirmed emergency medical condition->Emergency Medical Condition (MA) Reason for Exam: ams FINDINGS: BRAIN/VENTRICLES: There is no acute intracranial hemorrhage, mass effect or midline shift. No abnormal extra-axial fluid collection. The gray-white differentiation is maintained without evidence of an acute infarct. There is no evidence of hydrocephalus. Unchanged enlarged frontal dural spaces, mild cortical volume loss, and scattered low-attenuation white matter abnormalities. ORBITS: The visualized portion of the orbits demonstrate no acute abnormality. Status post right cataract surgery. SINUSES: The visualized paranasal sinuses and mastoid air cells demonstrate no acute abnormality.  SOFT TISSUES/SKULL:  No acute abnormality of the visualized skull or soft tissues. Unchanged hyperostosis frontalis interna. No acute intracranial abnormality. Similar senescent findings, as above. XR CHEST PORTABLE    Result Date: 8/10/2023  EXAMINATION: ONE XRAY VIEW OF THE CHEST 8/10/2023 12:05 pm COMPARISON: 07/02/2023 HISTORY: ORDERING SYSTEM PROVIDED HISTORY: AMS TECHNOLOGIST PROVIDED HISTORY: AMS Reason for Exam: altered mental status, port upr. FINDINGS: Cardiomediastinal silhouette and pulmonary vasculature are within normal limits. No focal airspace consolidation, pneumothorax, or pleural effusion. No free air beneath the diaphragm. No acute osseous abnormality. No acute intrathoracic process. Medical Decision [id number]:     Results       ** No results found for the last 336 hours. **          Contains abnormal data Culture, Urine  Order: [id number]  Status: Final result     Visible to patient: Yes (not seen)     Next appt: 09/07/2023 at 09:15 AM in Family Medicine ([address], [place])     Dx: Dysuria     Specimen Information: Urine, straight catheter   2 Result Notes      Component    Specimen Description . URINE,STRAIGHT CATHETER    Culture METHICILLIN RESISTANT STAPHYLOCOCCUS AUREUS >100,000 CFU/ML Abnormal     Resulting [address]        Susceptibility    Methicillin-Resistant Staphylococcus aureus (1)    Antibiotic Interpretation Microscan Method Status    penicillin Resistant >=0.5 BACTERIAL SUSCEPTIBILITY PANEL SIDDHARTH Final    gentamicin Sensitive <=0.5 BACTERIAL SUSCEPTIBILITY PANEL SIDDHARTH Final     Gentamicin is used only in combination with other active agents that test susceptible.        levofloxacin Intermediate 4 BACTERIAL SUSCEPTIBILITY PANEL SIDDHARTH Final    nitrofurantoin Sensitive <=16 BACTERIAL SUSCEPTIBILITY PANEL SIDDHARTH Final    oxacillin Resistant >=4 BACTERIAL SUSCEPTIBILITY PANEL SIDDHARTH Final    tetracycline Sensitive <=1 BACTERIAL SUSCEPTIBILITY PANEL SIDDHARTH Final

## 2023-09-02 NOTE — PLAN OF CARE
Problem: Discharge Planning  Goal: Discharge to home or other facility with appropriate resources  9/2/2023 0034 by Jesse Duncan RN  Outcome: Progressing  9/1/2023 1641 by Samantha Moseley RN  Outcome: Progressing  Flowsheets (Taken 9/1/2023 0804)  Discharge to home or other facility with appropriate resources: Identify barriers to discharge with patient and caregiver     Problem: Skin/Tissue Integrity  Goal: Absence of new skin breakdown  Description: 1. Monitor for areas of redness and/or skin breakdown  2. Assess vascular access sites hourly  3. Every 4-6 hours minimum:  Change oxygen saturation probe site  4. Every 4-6 hours:  If on nasal continuous positive airway pressure, respiratory therapy assess nares and determine need for appliance change or resting period. 9/2/2023 0034 by Jesse Duncan RN  Outcome: Progressing  9/1/2023 1641 by Samantah Moseley RN  Outcome: Progressing     Problem: Safety - Adult  Goal: Free from fall injury  9/2/2023 0034 by Jesse Duncan RN  Outcome: Progressing  9/1/2023 1641 by Samantha Moseley RN  Outcome: Progressing  Flowsheets (Taken 9/1/2023 0733)  Free From Fall Injury: Nohemi Tracy family/caregiver on patient safety     Problem: ABCDS Injury Assessment  Goal: Absence of physical injury  9/2/2023 0034 by Jesse Duncan RN  Outcome: Progressing  9/1/2023 1641 by Samantha Moseley RN  Outcome: Progressing     Problem: Confusion  Goal: Confusion, delirium, dementia, or psychosis is improved or at baseline  Description: INTERVENTIONS:  1. Assess for possible contributors to thought disturbance, including medications, impaired vision or hearing, underlying metabolic abnormalities, dehydration, psychiatric diagnoses, and notify attending LIP  2. [place] high risk fall precautions, as indicated  3. Provide frequent short contacts to provide reality reorientation, refocusing and direction  4. Decrease environmental stimuli, including noise as appropriate  5. Monitor and intervene to maintain adequate nutrition, hydration, elimination, sleep and activity  6. If unable to ensure safety without constant attention obtain sitter and review sitter guidelines with assigned personnel  7.  Initiate Psychosocial CNS and Spiritual Care consult, as indicated  9/2/2023 0034 by Candida Ross RN  Outcome: Progressing  9/1/2023 1641 by Soco Saavedra RN  Outcome: Progressing     Problem: Nutrition Deficit:  Goal: Optimize nutritional status  9/2/2023 0034 by Candida Ross RN  Outcome: Progressing  9/1/2023 1641 by Soco Saavedra RN  Outcome: Progressing     Problem: Pain  Goal: Verbalizes/displays adequate comfort level or baseline comfort level  9/2/2023 0034 by Candida Ross RN  Outcome: Progressing  9/1/2023 1641 by Soco Saavedra RN  Outcome: Progressing

## 2023-09-02 NOTE — PROGRESS NOTES
Occupational Therapy  Facility/Department: [facility] RENAL//MED SURG  Occupational Therapy Daily Progress Note    Name: Rebecca Aldrich  : 1940  MRN: [id number]  Date of Service: 2023    Discharge Recommendations:Further therapy recommended at discharge. Patient would benefit from continued therapy after discharge          Patient Diagnosis(es): The primary encounter diagnosis was Septicemia [facility]). Diagnoses of Altered mental status, unspecified altered mental status type, Acute cystitis without hematuria, and OUSMANE (generalized anxiety disorder) were also pertinent to this visit. Past Medical History:  has a past medical history of OLIVA (acute kidney injury) ([facility]), Anxiety and depression, Depression, GERD (gastroesophageal reflux disease), and Weight loss. Past Surgical History:  has a past surgical history that includes Spine surgery; Tonsillectomy; Colonoscopy (2011); eye surgery; Upper gastrointestinal endoscopy (2014); Colonoscopy (2014); Endoscopy, colon, diagnostic; Coccyx removal (1950); and Breast biopsy (Right, ). Treatment Diagnosis: Delirium      Assessment   Performance deficits / Impairments: Decreased functional mobility ; Decreased safe awareness;Decreased balance;Decreased ADL status; Decreased endurance;Decreased high-level IADLs;Decreased cognition;Decreased coordination;Decreased posture;Decreased strength;Decreased fine motor control  Assessment: Pt continues to require assist for all ADL tasks and transfers this session. Pt limited with some direction following this session and required increased time for all tasks.   Treatment Diagnosis: Delirium  Prognosis: Good  Decision Making: Medium Complexity  REQUIRES OT FOLLOW-UP: Yes  Activity Tolerance  Activity Tolerance: Treatment limited secondary to decreased cognition;Patient limited by fatigue;Patient Tolerated treatment well        Plan   Occupational Therapy Plan  Times Per Week: 3 x week Restrictions  Restrictions/Precautions  Restrictions/Precautions: Up as Tolerated, Bed Alarm, Contact Precautions, Other (comment)  Required Braces or Orthoses?: No  Position Activity Restriction  Other position/activity restrictions: Recent tachycardia    Subjective   General  Chart Reviewed: Yes  Patient assessed for rehabilitation services?: Yes  Response to previous treatment: Patient with no complaints from previous session  Family / Caregiver Present: No  General Comment  Comments: RN/Pt ok for therapy, no c/o pain from pt. Objective           Safety Devices  Type of Devices: Gait belt;Left in bed;Call light within reach; Patient at risk for falls; Bed alarm in place; All fall risk precautions in place;Nurse notified  Restraints  Restraints Initially in Place: No  Bed Mobility Training  Bed Mobility Training: Yes  Overall Level of Assistance: Stand-by assistance  Interventions: Safety awareness training;Verbal cues  Supine to Sit: Stand-by assistance  Sit to Supine: Stand-by assistance  Scooting: Stand-by assistance  Balance  Sitting: Without support (Static/dynamic activity, sitting EOB/unsupported in recliner chair/toilet.)  Standing: With support (CGA-SBA to ensure stability+ RW, no LOB. ~25 minutes with several sitting rest breaks.)  Transfer Training  Transfer Training: Yes  Overall Level of Assistance: Adaptive equipment; Additional time;Contact-guard assistance;Minimum assistance;Assist X1 (EOB->recliner->stood at sink->toilet->recliner<->stand 3 times->toilet->mobility->EOB->supine. Pt. declined to stay sitting up in chair despite education on benefits of OOB.)  Interventions: Safety awareness training;Verbal cues (Cues for safety and B hand placement.)  Sit to Stand: Adaptive equipment; Additional time;Contact-guard assistance  Stand to Sit: Adaptive equipment; Additional time;Contact-guard assistance  Bed to Chair: Adaptive equipment; Additional time;Contact-guard assistance  Toilet Transfer:

## 2023-09-02 NOTE — PLAN OF CARE
Problem: Discharge Planning  Goal: Discharge to home or other facility with appropriate resources  Outcome: Progressing  Flowsheets (Taken 9/2/2023 0803)  Discharge to home or other facility with appropriate resources: Identify barriers to discharge with patient and caregiver     Problem: Skin/Tissue Integrity  Goal: Absence of new skin breakdown  Description: 1. Monitor for areas of redness and/or skin breakdown  2. Assess vascular access sites hourly  3. Every 4-6 hours minimum:  Change oxygen saturation probe site  4. Every 4-6 hours:  If on nasal continuous positive airway pressure, respiratory therapy assess nares and determine need for appliance change or resting period. Outcome: Progressing     Problem: Safety - Adult  Goal: Free from fall injury  Outcome: Progressing  Flowsheets (Taken 9/2/2023 0719)  Free From Fall Injury: Instruct family/caregiver on patient safety     Problem: ABCDS Injury Assessment  Goal: Absence of physical injury  Outcome: Progressing  Flowsheets (Taken 9/2/2023 0719)  Absence of Physical Injury: Implement safety measures based on patient assessment     Problem: Confusion  Goal: Confusion, delirium, dementia, or psychosis is improved or at baseline  Description: INTERVENTIONS:  1. Assess for possible contributors to thought disturbance, including medications, impaired vision or hearing, underlying metabolic abnormalities, dehydration, psychiatric diagnoses, and notify attending LIP  2. [place] high risk fall precautions, as indicated  3. Provide frequent short contacts to provide reality reorientation, refocusing and direction  4. Decrease environmental stimuli, including noise as appropriate  5. Monitor and intervene to maintain adequate nutrition, hydration, elimination, sleep and activity  6. If unable to ensure safety without constant attention obtain sitter and review sitter guidelines with assigned personnel  7.  Initiate Psychosocial CNS and Spiritual Care consult, as indicated  Outcome: Progressing     Problem: Nutrition Deficit:  Goal: Optimize nutritional status  Outcome: Progressing     Problem: Pain  Goal: Verbalizes/displays adequate comfort level or baseline comfort level  Outcome: Progressing

## 2023-09-03 PROCEDURE — 1200000000 HC SEMI PRIVATE

## 2023-09-03 PROCEDURE — 6370000000 HC RX 637 (ALT 250 FOR IP)

## 2023-09-03 PROCEDURE — 2580000003 HC RX 258

## 2023-09-03 PROCEDURE — 6370000000 HC RX 637 (ALT 250 FOR IP): Performed by: INTERNAL MEDICINE

## 2023-09-03 PROCEDURE — 6360000002 HC RX W HCPCS

## 2023-09-03 PROCEDURE — 99232 SBSQ HOSP IP/OBS MODERATE 35: CPT | Performed by: INTERNAL MEDICINE

## 2023-09-03 RX ADMIN — ENOXAPARIN SODIUM 40 MG: 100 INJECTION SUBCUTANEOUS at 08:34

## 2023-09-03 RX ADMIN — BUSPIRONE HYDROCHLORIDE 15 MG: 15 TABLET ORAL at 13:00

## 2023-09-03 RX ADMIN — SODIUM CHLORIDE, PRESERVATIVE FREE 10 ML: 5 INJECTION INTRAVENOUS at 08:35

## 2023-09-03 RX ADMIN — STANDARDIZED SENNA CONCENTRATE AND DOCUSATE SODIUM 2 TABLET: 8.6; 5 TABLET ORAL at 08:34

## 2023-09-03 RX ADMIN — PROPRANOLOL HYDROCHLORIDE 20 MG: 20 TABLET ORAL at 08:34

## 2023-09-03 RX ADMIN — DULOXETINE HYDROCHLORIDE 60 MG: 30 CAPSULE, DELAYED RELEASE ORAL at 20:18

## 2023-09-03 RX ADMIN — ATORVASTATIN CALCIUM 20 MG: 20 TABLET, FILM COATED ORAL at 08:34

## 2023-09-03 RX ADMIN — CLONAZEPAM 0.25 MG: 0.5 TABLET ORAL at 20:19

## 2023-09-03 RX ADMIN — PROPRANOLOL HYDROCHLORIDE 20 MG: 20 TABLET ORAL at 20:18

## 2023-09-03 RX ADMIN — BUSPIRONE HYDROCHLORIDE 15 MG: 15 TABLET ORAL at 20:18

## 2023-09-03 RX ADMIN — SODIUM CHLORIDE, PRESERVATIVE FREE 10 ML: 5 INJECTION INTRAVENOUS at 20:24

## 2023-09-03 RX ADMIN — DULOXETINE HYDROCHLORIDE 60 MG: 30 CAPSULE, DELAYED RELEASE ORAL at 08:34

## 2023-09-03 RX ADMIN — QUETIAPINE FUMARATE 25 MG: 25 TABLET ORAL at 20:18

## 2023-09-03 RX ADMIN — MIRTAZAPINE 15 MG: 15 TABLET, ORALLY DISINTEGRATING ORAL at 20:18

## 2023-09-03 RX ADMIN — BUSPIRONE HYDROCHLORIDE 15 MG: 15 TABLET ORAL at 08:34

## 2023-09-03 RX ADMIN — CLONAZEPAM 0.25 MG: 0.5 TABLET ORAL at 08:35

## 2023-09-03 ASSESSMENT — ENCOUNTER SYMPTOMS
EYE DISCHARGE: 0
COLOR CHANGE: 0
EYE ITCHING: 0
DIARRHEA: 0
NAUSEA: 0
SHORTNESS OF BREATH: 0
COUGH: 0
VOMITING: 0
RHINORRHEA: 0

## 2023-09-03 NOTE — PLAN OF CARE
Problem: Discharge Planning  Goal: Discharge to home or other facility with appropriate resources  9/3/2023 0336 by Curtis Kamara  Outcome: Progressing  9/2/2023 1500 by Macarena Ortiz RN  Outcome: Progressing  Flowsheets (Taken 9/2/2023 0803)  Discharge to home or other facility with appropriate resources: Identify barriers to discharge with patient and caregiver     Problem: Skin/Tissue Integrity  Goal: Absence of new skin breakdown  Description: 1. Monitor for areas of redness and/or skin breakdown  2. Assess vascular access sites hourly  3. Every 4-6 hours minimum:  Change oxygen saturation probe site  4. Every 4-6 hours:  If on nasal continuous positive airway pressure, respiratory therapy assess nares and determine need for appliance change or resting period. 9/3/2023 0336 by Curtis Kamara  Outcome: Progressing  9/2/2023 1500 by Macarena Ortiz RN  Outcome: Progressing     Problem: Safety - Adult  Goal: Free from fall injury  9/3/2023 0336 by Curtis Kamara  Outcome: Progressing  9/2/2023 1500 by Macarena Ortiz RN  Outcome: Progressing  Flowsheets (Taken 9/2/2023 0719)  Free From Fall Injury: Instruct family/caregiver on patient safety     Problem: ABCDS Injury Assessment  Goal: Absence of physical injury  9/3/2023 0336 by Curtis Kamara  Outcome: Progressing  9/2/2023 1500 by Macarena Ortiz RN  Outcome: Progressing  Flowsheets (Taken 9/2/2023 0719)  Absence of Physical Injury: Implement safety measures based on patient assessment     Problem: Confusion  Goal: Confusion, delirium, dementia, or psychosis is improved or at baseline  Description: INTERVENTIONS:  1. Assess for possible contributors to thought disturbance, including medications, impaired vision or hearing, underlying metabolic abnormalities, dehydration, psychiatric diagnoses, and notify attending LIP  2. [place] high risk fall precautions, as indicated  3. Provide frequent short contacts to provide reality reorientation, refocusing and direction  4. Decrease environmental stimuli, including noise as appropriate  5. Monitor and intervene to maintain adequate nutrition, hydration, elimination, sleep and activity  6. If unable to ensure safety without constant attention obtain sitter and review sitter guidelines with assigned personnel  7.  Initiate Psychosocial CNS and Spiritual Care consult, as indicated  9/3/2023 0336 by Ryan Smiley  Outcome: Progressing  9/2/2023 1500 by Josesito Price RN  Outcome: Progressing     Problem: Nutrition Deficit:  Goal: Optimize nutritional status  9/3/2023 0336 by Ryan Smiley  Outcome: Progressing  9/2/2023 1500 by Josesito Price RN  Outcome: Progressing     Problem: Pain  Goal: Verbalizes/displays adequate comfort level or baseline comfort level  9/3/2023 0336 by Ryan Smiley  Outcome: Progressing  9/2/2023 1500 by Josesito Price RN  Outcome: Progressing

## 2023-09-03 NOTE — PLAN OF CARE
Problem: Discharge Planning  Goal: Discharge to home or other facility with appropriate resources  9/3/2023 1700 by Sheyla Ball RN  Outcome: Progressing  9/3/2023 0336 by Madelaine Cordero  Outcome: Progressing     Problem: Skin/Tissue Integrity  Goal: Absence of new skin breakdown  Description: 1. Monitor for areas of redness and/or skin breakdown  2. Assess vascular access sites hourly  3. Every 4-6 hours minimum:  Change oxygen saturation probe site  4. Every 4-6 hours:  If on nasal continuous positive airway pressure, respiratory therapy assess nares and determine need for appliance change or resting period. 9/3/2023 1700 by Sheyla Ball RN  Outcome: Progressing  9/3/2023 0336 by Madelaine Cordero  Outcome: Progressing     Problem: Safety - Adult  Goal: Free from fall injury  9/3/2023 1700 by Sheyla Ball RN  Outcome: Progressing  9/3/2023 0336 by Madelaine Cordero  Outcome: Progressing     Problem: ABCDS Injury Assessment  Goal: Absence of physical injury  9/3/2023 1700 by Sheyla Ball RN  Outcome: Progressing  9/3/2023 0336 by Madelaine Cordero  Outcome: Progressing     Problem: Confusion  Goal: Confusion, delirium, dementia, or psychosis is improved or at baseline  Description: INTERVENTIONS:  1. Assess for possible contributors to thought disturbance, including medications, impaired vision or hearing, underlying metabolic abnormalities, dehydration, psychiatric diagnoses, and notify attending LIP  2. [place] high risk fall precautions, as indicated  3. Provide frequent short contacts to provide reality reorientation, refocusing and direction  4. Decrease environmental stimuli, including noise as appropriate  5. Monitor and intervene to maintain adequate nutrition, hydration, elimination, sleep and activity  6. If unable to ensure safety without constant attention obtain sitter and review sitter guidelines with assigned personnel  7.  Initiate Psychosocial CNS and Spiritual Care consult, as indicated  9/3/2023 1700 by

## 2023-09-04 PROCEDURE — 94761 N-INVAS EAR/PLS OXIMETRY MLT: CPT

## 2023-09-04 PROCEDURE — 99232 SBSQ HOSP IP/OBS MODERATE 35: CPT | Performed by: INTERNAL MEDICINE

## 2023-09-04 PROCEDURE — 6370000000 HC RX 637 (ALT 250 FOR IP)

## 2023-09-04 PROCEDURE — 6360000002 HC RX W HCPCS

## 2023-09-04 PROCEDURE — 2580000003 HC RX 258

## 2023-09-04 PROCEDURE — 1200000000 HC SEMI PRIVATE

## 2023-09-04 PROCEDURE — 6370000000 HC RX 637 (ALT 250 FOR IP): Performed by: INTERNAL MEDICINE

## 2023-09-04 RX ADMIN — BUSPIRONE HYDROCHLORIDE 15 MG: 15 TABLET ORAL at 08:12

## 2023-09-04 RX ADMIN — MIRTAZAPINE 15 MG: 15 TABLET, ORALLY DISINTEGRATING ORAL at 20:19

## 2023-09-04 RX ADMIN — SODIUM CHLORIDE, PRESERVATIVE FREE 10 ML: 5 INJECTION INTRAVENOUS at 08:13

## 2023-09-04 RX ADMIN — BUSPIRONE HYDROCHLORIDE 15 MG: 15 TABLET ORAL at 13:13

## 2023-09-04 RX ADMIN — PROPRANOLOL HYDROCHLORIDE 20 MG: 20 TABLET ORAL at 20:19

## 2023-09-04 RX ADMIN — STANDARDIZED SENNA CONCENTRATE AND DOCUSATE SODIUM 2 TABLET: 8.6; 5 TABLET ORAL at 08:12

## 2023-09-04 RX ADMIN — QUETIAPINE FUMARATE 25 MG: 25 TABLET ORAL at 20:19

## 2023-09-04 RX ADMIN — ENOXAPARIN SODIUM 40 MG: 100 INJECTION SUBCUTANEOUS at 08:16

## 2023-09-04 RX ADMIN — PROPRANOLOL HYDROCHLORIDE 20 MG: 20 TABLET ORAL at 08:12

## 2023-09-04 RX ADMIN — DULOXETINE HYDROCHLORIDE 60 MG: 30 CAPSULE, DELAYED RELEASE ORAL at 08:12

## 2023-09-04 RX ADMIN — BUSPIRONE HYDROCHLORIDE 15 MG: 15 TABLET ORAL at 20:18

## 2023-09-04 RX ADMIN — CLONAZEPAM 0.25 MG: 0.5 TABLET ORAL at 20:20

## 2023-09-04 RX ADMIN — SODIUM CHLORIDE, PRESERVATIVE FREE 10 ML: 5 INJECTION INTRAVENOUS at 20:21

## 2023-09-04 RX ADMIN — ATORVASTATIN CALCIUM 20 MG: 20 TABLET, FILM COATED ORAL at 08:12

## 2023-09-04 RX ADMIN — DULOXETINE HYDROCHLORIDE 60 MG: 30 CAPSULE, DELAYED RELEASE ORAL at 20:19

## 2023-09-04 RX ADMIN — CLONAZEPAM 0.25 MG: 0.5 TABLET ORAL at 08:12

## 2023-09-04 ASSESSMENT — ENCOUNTER SYMPTOMS
DIARRHEA: 0
EYE DISCHARGE: 0
COUGH: 0
RHINORRHEA: 0
SHORTNESS OF BREATH: 0
VOMITING: 0
COLOR CHANGE: 0
EYE ITCHING: 0
NAUSEA: 0

## 2023-09-04 NOTE — PLAN OF CARE
Problem: Discharge Planning  Goal: Discharge to home or other facility with appropriate resources  Outcome: Progressing     Problem: Skin/Tissue Integrity  Goal: Absence of new skin breakdown  Description: 1. Monitor for areas of redness and/or skin breakdown  2. Assess vascular access sites hourly  3. Every 4-6 hours minimum:  Change oxygen saturation probe site  4. Every 4-6 hours:  If on nasal continuous positive airway pressure, respiratory therapy assess nares and determine need for appliance change or resting period. 9/4/2023 1806 by Anna Ball RN  Outcome: Progressing  9/4/2023 0543 by Samantha Villareal RN  Outcome: Progressing     Problem: Safety - Adult  Goal: Free from fall injury  Outcome: Progressing     Problem: ABCDS Injury Assessment  Goal: Absence of physical injury  Outcome: Progressing     Problem: Confusion  Goal: Confusion, delirium, dementia, or psychosis is improved or at baseline  Description: INTERVENTIONS:  1. Assess for possible contributors to thought disturbance, including medications, impaired vision or hearing, underlying metabolic abnormalities, dehydration, psychiatric diagnoses, and notify attending LIP  2. [place] high risk fall precautions, as indicated  3. Provide frequent short contacts to provide reality reorientation, refocusing and direction  4. Decrease environmental stimuli, including noise as appropriate  5. Monitor and intervene to maintain adequate nutrition, hydration, elimination, sleep and activity  6. If unable to ensure safety without constant attention obtain sitter and review sitter guidelines with assigned personnel  7.  Initiate Psychosocial CNS and Spiritual Care consult, as indicated  Outcome: Progressing     Problem: Nutrition Deficit:  Goal: Optimize nutritional status  Outcome: Progressing     Problem: Pain  Goal: Verbalizes/displays adequate comfort level or baseline comfort level  Outcome: Progressing

## 2023-09-04 NOTE — PLAN OF CARE
Problem: Skin/Tissue Integrity  Goal: Absence of new skin breakdown  Description: 1. Monitor for areas of redness and/or skin breakdown  2. Assess vascular access sites hourly  3. Every 4-6 hours minimum:  Change oxygen saturation probe site  4. Every 4-6 hours:  If on nasal continuous positive airway pressure, respiratory therapy assess nares and determine need for appliance change or resting period.   9/4/2023 0543 by Samantha Villareal RN  Outcome: Progressing

## 2023-09-04 NOTE — PROGRESS NOTES
[facility]  Internal Medicine Teaching Residency Program  Inpatient Daily Progress Note  ______________________________________________________________________________    Patient: Ba Bueno  YOB: 1940   [facility]:[id number]    Acct: [de-identified]     Room: [address] [place] [address]  Admit date: 8/10/2023  Today's date: 09/04/23  Number of days in the hospital: 25    SUBJECTIVE   Admitting Diagnosis: Delirium  CC: Altered Mental Status    -Pt seen and examined at bedside. Chart & results reviewed. -Patient was lying comfortably in bed  -Patient is afebrile and hemodynamically stable  -Patient seems to be slightly tachypneic and anxious, as baseline  -Patient is still awaiting pre-CERT approval for SNF placement. Review of Systems   Constitutional:  Positive for activity change. Negative for fatigue. HENT:  Negative for congestion and rhinorrhea. Eyes:  Negative for discharge and itching. Respiratory:  Negative for cough and shortness of breath. Cardiovascular:  Negative for chest pain. Gastrointestinal:  Negative for diarrhea, nausea and vomiting. Genitourinary:  Negative for flank pain. Croft catheter in place   Skin:  Negative for color change. Neurological:  Negative for facial asymmetry (Right-sided eyelid droop) and headaches. Psychiatric/Behavioral:  Positive for agitation. Negative for behavioral problems and confusion. BRIEF HISTORY     The patient is a 80 y.o. female with past medical history of generalized anxiety disorder, parkinsonism, colitis, acute urinary retention, OLIVA presented  with altered mental status. As per her son the patient is being disoriented for more than 24 hours in the nursing facility. As per the medical record she was admitted in the hospital on 7/2/2023 for the management of generalized anxiety disorder.   The patient had repeated episodes of anxiety and tachycardia throughout her hospital stay Head: Normocephalic and atraumatic. Nose: No congestion. Mouth/Throat:      Mouth: Mucous membranes are moist.      Pharynx: Oropharynx is clear. No oropharyngeal exudate. Eyes:      General: No scleral icterus. Extraocular Movements: Extraocular movements intact. Conjunctiva/sclera: Conjunctivae normal.   Cardiovascular:      Rate and Rhythm: Regular rhythm. Tachycardia present. Pulses: Normal pulses. Heart sounds: Normal heart sounds. No murmur heard. Pulmonary:      Effort: Pulmonary effort is normal. No respiratory distress. Breath sounds: Normal breath sounds. Abdominal:      General: Bowel sounds are normal.      Palpations: Abdomen is soft. Tenderness: There is no abdominal tenderness. There is no guarding or rebound. Musculoskeletal:         General: Normal range of motion. Cervical back: Normal range of motion. No rigidity. Right lower leg: No edema. Left lower leg: No edema. Skin:     General: Skin is warm. Capillary Refill: Capillary refill takes less than 2 seconds. Coloration: Skin is not pale. Neurological:      General: No focal deficit present. Mental Status: She is alert and oriented to person, place, and time. Cranial Nerves: No cranial nerve deficit.    Psychiatric:         Mood and Affect: Mood normal.         Behavior: Behavior normal.         Medications:  Scheduled Medications:    mirtazapine  15 mg Oral Nightly    potassium bicarb-citric acid  40 mEq Oral Once    potassium bicarb-citric acid  40 mEq Oral Once    clonazePAM  0.25 mg Oral BID    propranolol  20 mg Oral BID    atorvastatin  20 mg Oral Daily    sennosides-docusate sodium  2 tablet Oral Daily    busPIRone  15 mg Oral TID    DULoxetine  60 mg Oral BID    sodium chloride flush  5-40 mL IntraVENous 2 times per day    enoxaparin  40 mg SubCUTAneous Daily    QUEtiapine  25 mg Oral Nightly     Continuous Infusions:    dextrose      sodium chloride malnutrition ([address])  Resolved Problems:    OLIVA (acute kidney injury) ([address])    Dehydration    MRSA infection    Urinary retention    Catheter-associated urinary tract infection ([address])    Acute metabolic encephalopathy              Acute Delirium : improved   -Klonopin 0.25 mg   -Avoid Benzodiazepines strictly  -is on Seroquel  -Patient at baseline     H/O Drug-induced parkinsonism  -Patient has a 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 intake  Continue Mirtazapine 15 nightly     DVT ppx: Lovenox 40 Mg  GI ppx: Not indicated     PT/OT : Consulted  Discharge planning: SNF placement pending pre-CERT, will work with Ivette Russo MD  Internal Medicine Resident, PGY-1  [address] [place], [place].  9/4/2023, 1:09 AM

## 2023-09-04 NOTE — PROGRESS NOTES
[facility] - Progress Note    Today's Date and Time: 9/4/2023, 11:12 AM    Impression :   Altered mentation-better  Recent hx MRSA UTI on 7/27/23-  Treated with Macrobid  Urinary retention  Hypokalemia  GERD  Anxiety  Parkinson's    Recommendations:   Monitor off antibiotics   D/C IV Vancomycin- No significant growth on urine culture from 8/10/23  Urology recommendations  Stable for D/C from ID perspective    Medical Decision Making/Summary/Discussion:9/4/2023       Infection Control Recommendations   [place] Precautions  Contact Precautions for MRSA hx    Antimicrobial Stewardship Recommendations     Discontinuation of therapy  Coordination of Outpatient Care:   Estimated Length of IV antimicrobials:NA  Patient will need Midline Catheter Insertion: No  Patient will need PICC line Insertion:No  Patient will need: Home IV , [address] [place],  SNF,  LTAC: No  Patient will need outpatient wound care: No    Chief complaint/reason for consultation:   MRSA UTI      History of Present Illness:   Aniya Alicia is a 80y.o.-year-old  female who was initially admitted on 8/10/2023. Patient seen at the request of Dr. Gregorio Onela:    This patient, who sees Dr. Silva Winston, with Urology, for urinary retention, and was treated for a MRSA UTI with Macrobid at the end of July 2023, presented to the ED from her nursing home with confusion x 24 hrs on 8/10/23. She has a history of anxiety that is treated with Klonopin. Her right pupil was noted to be dilated. A CT of her head was negative for any acute abnormalities. Labwork was mostly unremarkable other than hypokalemia and slightly elevated creatinine. The patient was given fluids and was administered a dose of IV Rocephin and was initiated on IV Vancomycin. Blood cultures have yielded no growth thus far and the urine culture shows no significant growth.      Urology was consulted and a CT abd/pelvis tissues. Unchanged hyperostosis frontalis interna. No acute intracranial abnormality. Similar senescent findings, as above. XR CHEST PORTABLE    Result Date: 8/10/2023  EXAMINATION: ONE XRAY VIEW OF THE CHEST 8/10/2023 12:05 pm COMPARISON: 07/02/2023 HISTORY: ORDERING SYSTEM PROVIDED HISTORY: AMS TECHNOLOGIST PROVIDED HISTORY: AMS Reason for Exam: altered mental status, port upr. FINDINGS: Cardiomediastinal silhouette and pulmonary vasculature are within normal limits. No focal airspace consolidation, pneumothorax, or pleural effusion. No free air beneath the diaphragm. No acute osseous abnormality. No acute intrathoracic process. Medical Decision [id number]-[id number]:     Results       ** No results found for the last 336 hours. **          Contains abnormal data Culture, Urine  Order: [id number]  Status: Final result     Visible to patient: Yes (not seen)     Next appt: 09/07/2023 at 09:15 AM in Family Medicine ([address], [place])     Dx: Dysuria     Specimen Information: Urine, straight catheter   2 Result Notes      Component    Specimen Description . URINE,STRAIGHT CATHETER    Culture METHICILLIN RESISTANT STAPHYLOCOCCUS AUREUS >100,000 CFU/ML Abnormal     Resulting [address]        Susceptibility    Methicillin-Resistant Staphylococcus aureus (1)    Antibiotic Interpretation Microscan Method Status    penicillin Resistant >=0.5 BACTERIAL SUSCEPTIBILITY PANEL SIDDHARTH Final    gentamicin Sensitive <=0.5 BACTERIAL SUSCEPTIBILITY PANEL SIDDHARTH Final     Gentamicin is used only in combination with other active agents that test susceptible.        levofloxacin Intermediate 4 BACTERIAL SUSCEPTIBILITY PANEL SIDDHARTH Final    nitrofurantoin Sensitive <=16 BACTERIAL SUSCEPTIBILITY PANEL SIDDHARTH Final    oxacillin Resistant >=4 BACTERIAL SUSCEPTIBILITY PANEL SIDDHARTH Final    tetracycline Sensitive <=1 BACTERIAL SUSCEPTIBILITY PANEL SIDDHARTH Final    trimethoprim-sulfamethoxazole Sensitive <=10

## 2023-09-04 NOTE — CARE COORDINATION
TRANSITIONAL CARE PLANNING/ [address] [facility] [place] Day: 25    Reason for Admission: Altered mental status [R41.82]  Septicemia ([address]) [A41.9]  Acute cystitis without hematuria [N30.00]  Sepsis ([address]) [A41.9]  Altered mental status, unspecified altered mental status type [R41.82]         Barriers to Discharge: SNF PRECERT    Readmission Risk              Risk of Unplanned Readmission:  23            Patient goals/Treatment Preferences/Transitional Plan:   Need updated pt/ot notes  Await precert [place] [facility]

## 2023-09-05 LAB
ANION GAP SERPL CALCULATED.3IONS-SCNC: 9 MMOL/L (ref 9–17)
BASOPHILS # BLD: 0.03 K/UL (ref 0–0.2)
BASOPHILS NFR BLD: 1 % (ref 0–2)
BUN SERPL-MCNC: 14 MG/DL (ref 8–23)
CALCIUM SERPL-MCNC: 9.6 MG/DL (ref 8.6–10.4)
CHLORIDE SERPL-SCNC: 102 MMOL/L (ref 98–107)
CO2 SERPL-SCNC: 26 MMOL/L (ref 20–31)
CREAT SERPL-MCNC: 0.5 MG/DL (ref 0.5–0.9)
EOSINOPHIL # BLD: 0.06 K/UL (ref 0–0.44)
EOSINOPHILS RELATIVE PERCENT: 1 % (ref 1–4)
ERYTHROCYTE [DISTWIDTH] IN BLOOD BY AUTOMATED COUNT: 13.7 % (ref 11.8–14.4)
GFR SERPL CREATININE-BSD FRML MDRD: >60 ML/MIN/1.73M2
GLUCOSE SERPL-MCNC: 97 MG/DL (ref 70–99)
HCT VFR BLD AUTO: 33.6 % (ref 36.3–47.1)
HGB BLD-MCNC: 11 G/DL (ref 11.9–15.1)
IMM GRANULOCYTES # BLD AUTO: <0.03 K/UL (ref 0–0.3)
IMM GRANULOCYTES NFR BLD: 0 %
LYMPHOCYTES NFR BLD: 2.74 K/UL (ref 1.1–3.7)
LYMPHOCYTES RELATIVE PERCENT: 53 % (ref 24–43)
MAGNESIUM SERPL-MCNC: 2.1 MG/DL (ref 1.6–2.6)
MCH RBC QN AUTO: 32.1 PG (ref 25.2–33.5)
MCHC RBC AUTO-ENTMCNC: 32.7 G/DL (ref 28.4–34.8)
MCV RBC AUTO: 98 FL (ref 82.6–102.9)
MONOCYTES NFR BLD: 0.43 K/UL (ref 0.1–1.2)
MONOCYTES NFR BLD: 8 % (ref 3–12)
NEUTROPHILS NFR BLD: 37 % (ref 36–65)
NEUTS SEG NFR BLD: 1.89 K/UL (ref 1.5–8.1)
NRBC BLD-RTO: 0 PER 100 WBC
PLATELET # BLD AUTO: 286 K/UL (ref 138–453)
PMV BLD AUTO: 9.3 FL (ref 8.1–13.5)
POTASSIUM SERPL-SCNC: 3.5 MMOL/L (ref 3.7–5.3)
RBC # BLD AUTO: 3.43 M/UL (ref 3.95–5.11)
SODIUM SERPL-SCNC: 137 MMOL/L (ref 135–144)
WBC OTHER # BLD: 5.2 K/UL (ref 3.5–11.3)

## 2023-09-05 PROCEDURE — 80048 BASIC METABOLIC PNL TOTAL CA: CPT

## 2023-09-05 PROCEDURE — 99232 SBSQ HOSP IP/OBS MODERATE 35: CPT | Performed by: INTERNAL MEDICINE

## 2023-09-05 PROCEDURE — 2580000003 HC RX 258

## 2023-09-05 PROCEDURE — 6370000000 HC RX 637 (ALT 250 FOR IP)

## 2023-09-05 PROCEDURE — 36415 COLL VENOUS BLD VENIPUNCTURE: CPT

## 2023-09-05 PROCEDURE — 6370000000 HC RX 637 (ALT 250 FOR IP): Performed by: INTERNAL MEDICINE

## 2023-09-05 PROCEDURE — 1200000000 HC SEMI PRIVATE

## 2023-09-05 PROCEDURE — 97110 THERAPEUTIC EXERCISES: CPT

## 2023-09-05 PROCEDURE — 97535 SELF CARE MNGMENT TRAINING: CPT

## 2023-09-05 PROCEDURE — 97530 THERAPEUTIC ACTIVITIES: CPT

## 2023-09-05 PROCEDURE — 6360000002 HC RX W HCPCS

## 2023-09-05 PROCEDURE — 83735 ASSAY OF MAGNESIUM: CPT

## 2023-09-05 PROCEDURE — 85025 COMPLETE CBC W/AUTO DIFF WBC: CPT

## 2023-09-05 RX ORDER — POTASSIUM CHLORIDE 20 MEQ/1
40 TABLET, EXTENDED RELEASE ORAL ONCE
Status: COMPLETED | OUTPATIENT
Start: 2023-09-05 | End: 2023-09-05

## 2023-09-05 RX ADMIN — BUSPIRONE HYDROCHLORIDE 15 MG: 15 TABLET ORAL at 20:50

## 2023-09-05 RX ADMIN — ENOXAPARIN SODIUM 40 MG: 100 INJECTION SUBCUTANEOUS at 09:03

## 2023-09-05 RX ADMIN — STANDARDIZED SENNA CONCENTRATE AND DOCUSATE SODIUM 2 TABLET: 8.6; 5 TABLET ORAL at 09:03

## 2023-09-05 RX ADMIN — PROPRANOLOL HYDROCHLORIDE 20 MG: 20 TABLET ORAL at 20:49

## 2023-09-05 RX ADMIN — SODIUM CHLORIDE, PRESERVATIVE FREE 10 ML: 5 INJECTION INTRAVENOUS at 09:03

## 2023-09-05 RX ADMIN — BUSPIRONE HYDROCHLORIDE 15 MG: 15 TABLET ORAL at 13:48

## 2023-09-05 RX ADMIN — BUSPIRONE HYDROCHLORIDE 15 MG: 15 TABLET ORAL at 09:03

## 2023-09-05 RX ADMIN — QUETIAPINE FUMARATE 25 MG: 25 TABLET ORAL at 20:49

## 2023-09-05 RX ADMIN — CLONAZEPAM 0.25 MG: 0.5 TABLET ORAL at 20:50

## 2023-09-05 RX ADMIN — ATORVASTATIN CALCIUM 20 MG: 20 TABLET, FILM COATED ORAL at 09:03

## 2023-09-05 RX ADMIN — DULOXETINE HYDROCHLORIDE 60 MG: 30 CAPSULE, DELAYED RELEASE ORAL at 20:49

## 2023-09-05 RX ADMIN — POTASSIUM CHLORIDE 40 MEQ: 1500 TABLET, EXTENDED RELEASE ORAL at 09:03

## 2023-09-05 RX ADMIN — MIRTAZAPINE 15 MG: 15 TABLET, ORALLY DISINTEGRATING ORAL at 20:50

## 2023-09-05 RX ADMIN — SODIUM CHLORIDE, PRESERVATIVE FREE 10 ML: 5 INJECTION INTRAVENOUS at 20:57

## 2023-09-05 RX ADMIN — PROPRANOLOL HYDROCHLORIDE 20 MG: 20 TABLET ORAL at 09:03

## 2023-09-05 RX ADMIN — DULOXETINE HYDROCHLORIDE 60 MG: 30 CAPSULE, DELAYED RELEASE ORAL at 09:03

## 2023-09-05 RX ADMIN — CLONAZEPAM 0.25 MG: 0.5 TABLET ORAL at 09:07

## 2023-09-05 ASSESSMENT — ENCOUNTER SYMPTOMS
COLOR CHANGE: 0
VOMITING: 0
EYE DISCHARGE: 0
RHINORRHEA: 0
COUGH: 0
SHORTNESS OF BREATH: 0
NAUSEA: 0
EYE ITCHING: 0
DIARRHEA: 0

## 2023-09-05 ASSESSMENT — PAIN SCALES - GENERAL: PAINLEVEL_OUTOF10: 0

## 2023-09-05 NOTE — PROGRESS NOTES
Physical Therapy  Facility/Department: [facility] RENAL//MED SURG  Daily Treatment Note    Name: Sade Fischer  : 1940  MRN: [id number]  Date of Service: 2023    Discharge Recommendations:  Patient would benefit from continued therapy after discharge          Patient Diagnosis(es): The primary encounter diagnosis was Septicemia [facility]). Diagnoses of Altered mental status, unspecified altered mental status type, Acute cystitis without hematuria, and OUSMANE (generalized anxiety disorder) were also pertinent to this visit. Past Medical History:  has a past medical history of OLIVA (acute kidney injury) ([address]), Anxiety and depression, Depression, GERD (gastroesophageal reflux disease), and Weight loss. Past Surgical History:  has a past surgical history that includes Spine surgery; Tonsillectomy; Colonoscopy (2011); eye surgery; Upper gastrointestinal endoscopy (2014); Colonoscopy (2014); Endoscopy, colon, diagnostic; Coccyx removal (1950); and Breast biopsy (Right, ). Assessment   Body Structures, Functions, Activity Limitations Requiring Skilled Therapeutic Intervention: Decreased functional mobility ; Increased pain;Decreased posture;Decreased strength;Decreased safe awareness;Decreased cognition;Decreased coordination;Decreased balance;Decreased endurance  Assessment: Pt ambulated 80 ft with RW and CGA, pt demonstrated decreased endurance requiring 3 standing rest breaks. Pt demonstrates significant cognitive deficits and would be unsafe to perform functional mobility unassisted. Pt would benefit from continued acute PT to address deficits and improve mobility.   Therapy Prognosis: Good  Activity Tolerance  Activity Tolerance: Patient limited by fatigue;Treatment limited secondary to decreased cognition     Plan   Physcial Therapy Plan  General Plan:  (5-6x/week)  Current Treatment Recommendations: Home exercise program, Gait training, Functional mobility training, Transfer training, Balance training, Strengthening, ROM, Pain management, Patient/Caregiver education & training, Safety education & training, Therapeutic activities, Positioning  Safety Devices  Type of Devices: Gait belt, Left in bed, Call light within reach, Patient at risk for falls, Bed alarm in place, All fall risk precautions in place, Nurse notified  Restraints  Restraints Initially in Place: No     Restrictions  Restrictions/Precautions  Restrictions/Precautions: Up as Tolerated, Bed Alarm, Contact Precautions, Other (comment)  Required Braces or Orthoses?: No  Position Activity Restriction  Other position/activity restrictions: Recent tachycardia     Subjective   General  Chart Reviewed: No  Patient assessed for rehabilitation services?: Yes  Response To Previous Treatment: Patient with no complaints from previous session. Family / Caregiver Present: No  Follows Commands: Impaired  General Comment  Comments: Pt retired to bed supine by request with call light and RN aware. Subjective  Subjective: Pt and RN agreeable to tx session, Pt supine in bed upon arrival with no c/o pain, pt demonstrating high anxiety, however pleasant and cooperative. Vision/Hearing  Vision  Vision: Impaired  Vision Exceptions: Wears glasses at all times  Hearing  Hearing: Exceptions to [facility]  Hearing Exceptions: Hard of hearing/hearing concerns; No hearing aid;Bilateral hearing aid    Cognition   Orientation  Overall Orientation Status: Within Functional Limits  Orientation Level: Oriented X4  Cognition  Overall Cognitive Status: Exceptions  Arousal/Alertness: Appropriate responses to stimuli  Following Commands: Follows one step commands with increased time; Follows one step commands with repetition  Attention Span: Attends with cues to redirect  Memory: Decreased recall of precautions;Decreased recall of biographical Information  Safety Judgement: Decreased awareness of need for assistance;Decreased awareness of need for safety  Problem Solving: Decreased awareness of errors;Assistance required to identify errors made;Assistance required to correct errors made  Insights: Decreased awareness of deficits  Initiation: Requires cues for some  Sequencing: Requires cues for some  Cognition Comment: Pt demonstrating high anxiety, breathing techniques to help ease pt required. Objective   Bed mobility  Supine to Sit: Contact guard assistance  Sit to Supine: Minimal assistance  Scooting: Contact guard assistance  Bed Mobility Comments: HOB elevated ~25 degrees. Pt required assistance to get back to bed for LE progression and positioning. Transfers  Sit to Stand: Contact guard assistance  Stand to Sit: Contact guard assistance  Comment: Assessed with RW, pt increased time and effort to come to standing postion, verbal and tactile cues utilized to promote more upright posture with poor return. Ambulation  Surface: Level tile  Device: Rolling Walker  Assistance: Contact guard assistance  Quality of Gait: Pt amb w/ greatly reduced gait speed, moderate flexed trunk posture, inadequate terminal knee extension SHALA, no LOB. Gait Deviations: Slow Pascale; Shuffles;Decreased step length;Decreased step height  Distance: 80ft  Comments: Pt required x 3 standing rest breaks to complete ambulation session. More Ambulation?: No  Stairs/Curb  Stairs?: No     Balance  Posture: Fair  Sitting - Static: Fair;+  Sitting - Dynamic: Fair;+  Standing - Static: Fair;+  Standing - Dynamic: Fair  Comments: Sitting Assessed EOB, pt required close SBA and CGA for sitting exercise. Standing assessed with RW  Exercise Treatment: .sit  Static Standing Balance Exercises: NBOS x 1 x 1 min no AD, Tandem stance x 1 ea x 30 sec no AD pt demonstrated deficit proprioception difficulty with achieving tandem position with feet.        OutComes Score    -PAC Score  -PAC Inpatient Mobility Raw Score : 17 (09/05/23 1051)  AM-PAC Inpatient T-Scale Score : 42.13 (09/05/23 1051)  Mobility Inpatient CMS

## 2023-09-05 NOTE — PLAN OF CARE
Problem: Discharge Planning  Goal: Discharge to home or other facility with appropriate resources  9/5/2023 0540 by Anamaria Cueva RN  Outcome: Progressing  9/4/2023 1806 by Jackie Ball RN  Outcome: Progressing     Problem: Skin/Tissue Integrity  Goal: Absence of new skin breakdown  Description: 1. Monitor for areas of redness and/or skin breakdown  2. Assess vascular access sites hourly  3. Every 4-6 hours minimum:  Change oxygen saturation probe site  4. Every 4-6 hours:  If on nasal continuous positive airway pressure, respiratory therapy assess nares and determine need for appliance change or resting period. 9/5/2023 0540 by Anamaria Cueva RN  Outcome: Progressing  9/4/2023 1806 by Jackie Ball RN  Outcome: Progressing     Problem: Safety - Adult  Goal: Free from fall injury  9/5/2023 0540 by Anamaria Cueva RN  Outcome: Progressing  Flowsheets (Taken 9/4/2023 2000)  Free From Fall Injury: Instruct family/caregiver on patient safety  9/4/2023 1806 by Jackie Ball RN  Outcome: Progressing     Problem: ABCDS Injury Assessment  Goal: Absence of physical injury  9/5/2023 0540 by Anamaria Cueva RN  Outcome: Progressing  Flowsheets (Taken 9/4/2023 2000)  Absence of Physical Injury: Implement safety measures based on patient assessment  9/4/2023 1806 by Jackie Ball RN  Outcome: Progressing     Problem: Confusion  Goal: Confusion, delirium, dementia, or psychosis is improved or at baseline  Description: INTERVENTIONS:  1. Assess for possible contributors to thought disturbance, including medications, impaired vision or hearing, underlying metabolic abnormalities, dehydration, psychiatric diagnoses, and notify attending LIP  2. [place] high risk fall precautions, as indicated  3. Provide frequent short contacts to provide reality reorientation, refocusing and direction  4. Decrease environmental stimuli, including noise as appropriate  5.  Monitor and intervene to maintain adequate nutrition,

## 2023-09-05 NOTE — PROGRESS NOTES
[facility] - Progress Note    Today's Date and Time: 9/5/2023, 10:29 AM    Impression :   Altered mentation-better  Recent hx MRSA UTI on 7/27/23-  Treated with Macrobid  Urinary retention  Hypokalemia  GERD  Anxiety  Parkinson's    Recommendations:   Monitor off antibiotics   D/C IV Vancomycin- No significant growth on urine culture from 8/10/23  Urology recommendations  Stable for D/C from ID perspective    Medical Decision Making/Summary/Discussion:9/5/2023       Infection Control Recommendations   [place] Precautions  Contact Precautions for MRSA hx    Antimicrobial Stewardship Recommendations     Discontinuation of therapy  Coordination of Outpatient Care:   Estimated Length of IV antimicrobials:NA  Patient will need Midline Catheter Insertion: No  Patient will need PICC line Insertion:No  Patient will need: Home IV , [address] [facility],  SNF,  LTAC: No  Patient will need outpatient wound care: No    Chief complaint/reason for consultation:   MRSA UTI      History of Present Illness:   Aminata El is a 80y.o.-year-old  female who was initially admitted on 8/10/2023. Patient seen at the request of Dr. Ulices Escobedo:    This patient, who sees Dr. Jordin Santos, with Urology, for urinary retention, and was treated for a MRSA UTI with Macrobid at the end of July 2023, presented to the ED from her nursing home with confusion x 24 hrs on 8/10/23. She has a history of anxiety that is treated with Klonopin. Her right pupil was noted to be dilated. A CT of her head was negative for any acute abnormalities. Labwork was mostly unremarkable other than hypokalemia and slightly elevated creatinine. The patient was given fluids and was administered a dose of IV Rocephin and was initiated on IV Vancomycin. Blood cultures have yielded no growth thus far and the urine culture shows no significant growth.      Urology was consulted and a CT abd/pelvis for input(s): HIV in the last 72 hours. No results for input(s): BC in the last 72 hours. Lab Results   Component Value Date/Time    MUCUS 1+ 08/10/2023 11:15 AM    RBC 3.43 09/05/2023 07:45 AM    TRICHOMONAS NOT REPORTED 09/12/2015 04:48 PM    WBC 5.2 09/05/2023 07:45 AM    YEAST NOT REPORTED 09/12/2015 04:48 PM    TURBIDITY Clear 08/10/2023 11:15 AM     Lab Results   Component Value Date/Time    CREATININE 0.5 09/05/2023 07:45 AM    GLUCOSE 97 09/05/2023 07:45 AM       Medical Decision Making-Imaging:   CT HEAD WO CONTRAST    Result Date: 8/10/2023  EXAMINATION: CT OF THE HEAD WITHOUT CONTRAST  8/10/2023 11:35 am TECHNIQUE: CT of the head was performed without the administration of intravenous contrast. Automated exposure control, iterative reconstruction, and/or weight based adjustment of the mA/kV was utilized to reduce the radiation dose to as low as reasonably achievable. COMPARISON: None. HISTORY: ORDERING SYSTEM PROVIDED HISTORY: AMS TECHNOLOGIST PROVIDED HISTORY: AMS Decision Support Exception - unselect if not a suspected or confirmed emergency medical condition->Emergency Medical Condition (MA) Reason for Exam: ams FINDINGS: BRAIN/VENTRICLES: There is no acute intracranial hemorrhage, mass effect or midline shift. No abnormal extra-axial fluid collection. The gray-white differentiation is maintained without evidence of an acute infarct. There is no evidence of hydrocephalus. Unchanged enlarged frontal dural spaces, mild cortical volume loss, and scattered low-attenuation white matter abnormalities. ORBITS: The visualized portion of the orbits demonstrate no acute abnormality. Status post right cataract surgery. SINUSES: The visualized paranasal sinuses and mastoid air cells demonstrate no acute abnormality. SOFT TISSUES/SKULL:  No acute abnormality of the visualized skull or soft tissues. Unchanged hyperostosis frontalis interna. No acute intracranial abnormality.  Similar senescent findings, as above.     XR CHEST PORTABLE    Result Date: 8/10/2023  EXAMINATION: ONE XRAY VIEW OF THE CHEST 8/10/2023 12:05 pm COMPARISON: 07/02/2023 HISTORY: ORDERING SYSTEM PROVIDED HISTORY: AMS TECHNOLOGIST PROVIDED HISTORY: AMS Reason for Exam: altered mental status, port upr. FINDINGS: Cardiomediastinal silhouette and pulmonary vasculature are within normal limits. No focal airspace consolidation, pneumothorax, or pleural effusion. No free air beneath the diaphragm. No acute osseous abnormality. No acute intrathoracic process. Medical Decision [id number]-Fhgnp:     Results       ** No results found for the last 336 hours. **          Contains abnormal data Culture, Urine  Order: [id number]  Status: Final result     Visible to patient: Yes (not seen)     Next appt: 09/07/2023 at 09:15 AM in Family Medicine ([address], [place])     Dx: Dysuria     Specimen Information: Urine, straight catheter   2 Result Notes      Component    Specimen Description . URINE,STRAIGHT CATHETER    Culture METHICILLIN RESISTANT STAPHYLOCOCCUS AUREUS >100,000 CFU/ML Abnormal     Resulting [address]        Susceptibility    Methicillin-Resistant Staphylococcus aureus (1)    Antibiotic Interpretation Microscan Method Status    penicillin Resistant >=0.5 BACTERIAL SUSCEPTIBILITY PANEL SIDDHARTH Final    gentamicin Sensitive <=0.5 BACTERIAL SUSCEPTIBILITY PANEL SIDDHARTH Final     Gentamicin is used only in combination with other active agents that test susceptible.        levofloxacin Intermediate 4 BACTERIAL SUSCEPTIBILITY PANEL SIDDHARTH Final    nitrofurantoin Sensitive <=16 BACTERIAL SUSCEPTIBILITY PANEL SIDDHARTH Final    oxacillin Resistant >=4 BACTERIAL SUSCEPTIBILITY PANEL SIDDHARTH Final    tetracycline Sensitive <=1 BACTERIAL SUSCEPTIBILITY PANEL SIDDHARTH Final    trimethoprim-sulfamethoxazole Sensitive <=10 BACTERIAL SUSCEPTIBILITY PANEL SIDDHARTH Final    vancomycin Sensitive 1 BACTERIAL SUSCEPTIBILITY PANEL SIDDHARTH Final

## 2023-09-05 NOTE — PLAN OF CARE
Problem: Discharge Planning  Goal: Discharge to home or other facility with appropriate resources  9/5/2023 1708 by Charbel Ball RN  Outcome: Progressing  9/5/2023 0540 by Devin Tannre RN  Outcome: Progressing     Problem: Skin/Tissue Integrity  Goal: Absence of new skin breakdown  Description: 1. Monitor for areas of redness and/or skin breakdown  2. Assess vascular access sites hourly  3. Every 4-6 hours minimum:  Change oxygen saturation probe site  4. Every 4-6 hours:  If on nasal continuous positive airway pressure, respiratory therapy assess nares and determine need for appliance change or resting period. 9/5/2023 1708 by Charbel Ball RN  Outcome: Progressing  9/5/2023 0540 by Devin Tanner RN  Outcome: Progressing     Problem: Safety - Adult  Goal: Free from fall injury  9/5/2023 1708 by Charbel Ball RN  Outcome: Progressing  9/5/2023 0540 by Devin Tanner RN  Outcome: Progressing  Flowsheets (Taken 9/4/2023 2000)  Free From Fall Injury: Instruct family/caregiver on patient safety     Problem: ABCDS Injury Assessment  Goal: Absence of physical injury  9/5/2023 1708 by Charbel Ball RN  Outcome: Progressing  9/5/2023 0540 by Devin Tanner RN  Outcome: Progressing  Flowsheets (Taken 9/4/2023 2000)  Absence of Physical Injury: Implement safety measures based on patient assessment     Problem: Confusion  Goal: Confusion, delirium, dementia, or psychosis is improved or at baseline  Description: INTERVENTIONS:  1. Assess for possible contributors to thought disturbance, including medications, impaired vision or hearing, underlying metabolic abnormalities, dehydration, psychiatric diagnoses, and notify attending LIP  2. [place] high risk fall precautions, as indicated  3. Provide frequent short contacts to provide reality reorientation, refocusing and direction  4. Decrease environmental stimuli, including noise as appropriate  5.  Monitor and intervene to maintain adequate nutrition, hydration, elimination, sleep and activity  6. If unable to ensure safety without constant attention obtain sitter and review sitter guidelines with assigned personnel  7.  Initiate Psychosocial CNS and Spiritual Care consult, as indicated  9/5/2023 1708 by Alcira Ball RN  Outcome: Progressing  9/5/2023 0540 by Ron Blanc RN  Outcome: Progressing     Problem: Nutrition Deficit:  Goal: Optimize nutritional status  9/5/2023 1708 by Alcira Ball RN  Outcome: Progressing  9/5/2023 1550 by Ravi Dixon, MS, RD, LD  Outcome: Progressing  Flowsheets (Taken 8/31/2023 1421 by Ashley Taylor, MS, RD, LD)  Nutrient intake appropriate for improving, restoring, or maintaining nutritional needs:   Assess nutritional status and recommend course of action   Monitor oral intake, labs, and treatment plans   Recommend appropriate diets, oral nutritional supplements, and vitamin/mineral supplements  9/5/2023 0540 by Ron Blanc RN  Outcome: Progressing     Problem: Pain  Goal: Verbalizes/displays adequate comfort level or baseline comfort level  9/5/2023 1708 by Alcira Ball RN  Outcome: Progressing  9/5/2023 0540 by Ron Blanc RN  Outcome: Progressing

## 2023-09-05 NOTE — PROGRESS NOTES
[facility]  Internal Medicine Teaching Residency Program  Inpatient Daily Progress Note  ______________________________________________________________________________    Patient: Rajan Velarde  YOB: 1940   [id number]    Acct: [de-identified]     Room: [address] [place] [address]  Admit date: 8/10/2023  Today's date: 09/05/23  Number of days in the hospital: 26    SUBJECTIVE   Admitting Diagnosis: Delirium  CC: Altered Mental Status    -Pt seen and examined at bedside. Chart & results reviewed. -Patient was lying comfortably in bed  -Patient is afebrile and hemodynamically stable  -Patient seems to be slightly tachypneic and anxious, as baseline  -Patient is still awaiting pre-CERT approval for SNF placement. Review of Systems   Constitutional:  Positive for activity change. Negative for fatigue. HENT:  Negative for congestion and rhinorrhea. Eyes:  Negative for discharge and itching. Respiratory:  Negative for cough and shortness of breath. Cardiovascular:  Negative for chest pain. Gastrointestinal:  Negative for diarrhea, nausea and vomiting. Genitourinary:  Negative for flank pain. Croft catheter in place   Skin:  Negative for color change. Neurological:  Negative for facial asymmetry (Right-sided eyelid droop) and headaches. Psychiatric/Behavioral:  Positive for agitation. Negative for behavioral problems and confusion. BRIEF HISTORY     The patient is a 80 y.o. female with past medical history of generalized anxiety disorder, parkinsonism, colitis, acute urinary retention, OLIVA presented  with altered mental status. As per her son the patient is being disoriented for more than 24 hours in the nursing facility. As per the medical record she was admitted in the hospital on 7/2/2023 for the management of generalized anxiety disorder.   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 intake  Continue Mirtazapine 15 nightly     DVT ppx: Lovenox 40 Mg     PT/OT : Consulted  Discharge planning: SNF placement pending, precert will be started after pt worked with PTOT today    Scarlett Turpin MD  Internal Medicine Resident, PGY-1  [address], [place].  9/5/2023, 1:00 PM

## 2023-09-05 NOTE — PROGRESS NOTES
Occupational Therapy  Facility/Department: [facility] RENAL//MED SURG  Occupational Therapy Treatment     Name: Ba Bueno  : 1940  MRN: [id number]  Date of Service: 2023    Chief Complaint   Patient presents with    Altered Mental Status       Discharge Recommendations:  Patient would benefit from continued therapy after discharge  OT Equipment Recommendations  Equipment Needed: Yes  Mobility Devices: Juleen Counter; ADL Assistive Devices  Walker: Rolling  ADL Assistive Devices: Reacher;Long-handled Sponge       Patient Diagnosis(es): The primary encounter diagnosis was Septicemia ([address]). Diagnoses of Altered mental status, unspecified altered mental status type, Acute cystitis without hematuria, and OUSMAEN (generalized anxiety disorder) were also pertinent to this visit. Past Medical History:  has a past medical history of OLIVA (acute kidney injury) ([address]), Anxiety and depression, Depression, GERD (gastroesophageal reflux disease), and Weight loss. Past Surgical History:  has a past surgical history that includes Spine surgery; Tonsillectomy; Colonoscopy (2011); eye surgery; Upper gastrointestinal endoscopy (2014); Colonoscopy (2014); Endoscopy, colon, diagnostic; Coccyx removal (1950); and Breast biopsy (Right, ). Assessment   Performance deficits / Impairments: Decreased functional mobility ; Decreased safe awareness;Decreased ADL status; Decreased cognition;Decreased high-level IADLs;Decreased balance  Assessment: Pt supine upon arrival and agreeable to OT. Pt completes bed mobility SUP, functional transfers and functional mobility room distances and [address] way CGA 2WW. Pt engaging in oral hygiene, face washing, and hair grooming at sink base. Pt completes dynamic standing balance CGA 2WW while completing item retrieval with emphasis on safety and balance. Pt requires MIN V/C overall for safety throughout session.  Pt will benefit from cont OT to address deficits in ADLs, awareness training;Verbal cues (Education provided on B hand placement reaching to/from solid surfaces with fair carryover, education provided each trial)  Stand to Sit: Contact-guard assistance; Adaptive equipment  Gait  Overall Level of Assistance: Contact-guard assistance; Adaptive equipment ([facility] 2WW room and [address] way distances to simulate household distances for safe return to PLOF.)  Interventions: Safety awareness training; Tactile cues; Verbal cues (Occ cues on walker management with directional changes and in smaller spaces with fair carryover.)        ADL  Grooming: Modified independent   Grooming Skilled Clinical Factors: Pt completes oral hygiene MOD ID, pt standing with CGA for safety at sink base. Pt completes hair grooming and face washing MOD I           Vision  Vision: Impaired  Vision Exceptions: Wears glasses at all times  Hearing  Hearing: Exceptions to [facility]  Hearing Exceptions: Hard of hearing/hearing concerns; No hearing aid;Bilateral hearing aid  Cognition  Overall Cognitive Status: Exceptions  Arousal/Alertness: Appropriate responses to stimuli  Following Commands: Follows one step commands with increased time; Follows one step commands with repetition  Safety Judgement: Decreased awareness of need for assistance;Decreased awareness of need for safety  Problem Solving: Assistance required to generate solutions;Assistance required to identify errors made  Insights: Decreased awareness of deficits  Initiation: Requires cues for some  Sequencing: Requires cues for some      Education Given To: Patient  Education Provided: Role of Therapy;Transfer Training;Plan of Care;ADL Adaptive Strategies  Education Provided Comments: Pt educated on safety implementation throughout session, education provided on walker management, EC tech/breathing tech for optimal engagement in meaningful activity.  Pt will benefit from cont education to facilitate improved carryover  Education Method: Demonstration;Verbal  Barriers

## 2023-09-06 PROCEDURE — 6370000000 HC RX 637 (ALT 250 FOR IP): Performed by: INTERNAL MEDICINE

## 2023-09-06 PROCEDURE — 99232 SBSQ HOSP IP/OBS MODERATE 35: CPT | Performed by: INTERNAL MEDICINE

## 2023-09-06 PROCEDURE — APPSS30 APP SPLIT SHARED TIME 16-30 MINUTES: Performed by: NURSE PRACTITIONER

## 2023-09-06 PROCEDURE — 97116 GAIT TRAINING THERAPY: CPT

## 2023-09-06 PROCEDURE — 6370000000 HC RX 637 (ALT 250 FOR IP)

## 2023-09-06 PROCEDURE — 2580000003 HC RX 258

## 2023-09-06 PROCEDURE — 97110 THERAPEUTIC EXERCISES: CPT

## 2023-09-06 PROCEDURE — 1200000000 HC SEMI PRIVATE

## 2023-09-06 PROCEDURE — 6360000002 HC RX W HCPCS

## 2023-09-06 RX ADMIN — QUETIAPINE FUMARATE 25 MG: 25 TABLET ORAL at 19:19

## 2023-09-06 RX ADMIN — CLONAZEPAM 0.25 MG: 0.5 TABLET ORAL at 19:17

## 2023-09-06 RX ADMIN — BUSPIRONE HYDROCHLORIDE 15 MG: 15 TABLET ORAL at 15:45

## 2023-09-06 RX ADMIN — SODIUM CHLORIDE, PRESERVATIVE FREE 10 ML: 5 INJECTION INTRAVENOUS at 19:18

## 2023-09-06 RX ADMIN — ENOXAPARIN SODIUM 40 MG: 100 INJECTION SUBCUTANEOUS at 08:59

## 2023-09-06 RX ADMIN — CLONAZEPAM 0.25 MG: 0.5 TABLET ORAL at 08:58

## 2023-09-06 RX ADMIN — DULOXETINE HYDROCHLORIDE 60 MG: 30 CAPSULE, DELAYED RELEASE ORAL at 08:59

## 2023-09-06 RX ADMIN — MIRTAZAPINE 15 MG: 15 TABLET, ORALLY DISINTEGRATING ORAL at 19:19

## 2023-09-06 RX ADMIN — ATORVASTATIN CALCIUM 20 MG: 20 TABLET, FILM COATED ORAL at 08:59

## 2023-09-06 RX ADMIN — STANDARDIZED SENNA CONCENTRATE AND DOCUSATE SODIUM 2 TABLET: 8.6; 5 TABLET ORAL at 08:58

## 2023-09-06 RX ADMIN — BUSPIRONE HYDROCHLORIDE 15 MG: 15 TABLET ORAL at 19:18

## 2023-09-06 RX ADMIN — SODIUM CHLORIDE, PRESERVATIVE FREE 10 ML: 5 INJECTION INTRAVENOUS at 09:09

## 2023-09-06 RX ADMIN — BUSPIRONE HYDROCHLORIDE 15 MG: 15 TABLET ORAL at 08:59

## 2023-09-06 RX ADMIN — DULOXETINE HYDROCHLORIDE 60 MG: 30 CAPSULE, DELAYED RELEASE ORAL at 19:18

## 2023-09-06 RX ADMIN — PROPRANOLOL HYDROCHLORIDE 20 MG: 20 TABLET ORAL at 08:58

## 2023-09-06 ASSESSMENT — PAIN SCALES - GENERAL
PAINLEVEL_OUTOF10: 0
PAINLEVEL_OUTOF10: 0

## 2023-09-06 NOTE — PROGRESS NOTES
Physical Therapy  Facility/Department: [facility] RENAL//MED SURG  Physical Therapy daily treatment note    Name: Aniya Alicia  : 1940  MRN: [id number]  Date of Service: 2023    Discharge Recommendations:  Patient would benefit from continued therapy after discharge   PT Equipment Recommendations  Equipment Needed: No      Patient Diagnosis(es): The primary encounter diagnosis was Septicemia [facility]). Diagnoses of Altered mental status, unspecified altered mental status type, Acute cystitis without hematuria, and OUSMANE (generalized anxiety disorder) were also pertinent to this visit. Past Medical History:  has a past medical history of OLIVA (acute kidney injury) ([address]), Anxiety and depression, Depression, GERD (gastroesophageal reflux disease), and Weight loss. Past Surgical History:  has a past surgical history that includes Spine surgery; Tonsillectomy; Colonoscopy (2011); eye surgery; Upper gastrointestinal endoscopy (2014); Colonoscopy (2014); Endoscopy, colon, diagnostic; Coccyx removal (1950); and Breast biopsy (Right, ). Assessment   Body Structures, Functions, Activity Limitations Requiring Skilled Therapeutic Intervention: Decreased functional mobility ; Increased pain;Decreased posture;Decreased strength;Decreased safe awareness;Decreased cognition;Decreased coordination;Decreased balance;Decreased endurance  Assessment: Pt amb 100 ft with Rw and CGA. Limited by decreased endurance, balance, strength, and high anxety throughout.  Recommend continued PT after d/c to address deficits  Therapy Prognosis: Good  Activity Tolerance  Activity Tolerance: Patient limited by fatigue;Patient limited by endurance     Plan   Physcial Therapy Plan  General Plan:  (5-6x)  Current Treatment Recommendations: Home exercise program, Gait training, Functional mobility training, Transfer training, Balance training, Strengthening, ROM, Pain management, Patient/Caregiver education & training, 1612)  Mobility Inpatient CMS 0-100% Score: 50.57 (09/06/23 1612)  Mobility Inpatient CMS G-Code Modifier : CK (09/06/23 1612)        Goals  Short Term Goals  Time Frame for Short Term Goals: 14 visits  Short Term Goal 1: Supine to/from sit with SBA. Short Term Goal 2: Sit to/from stand with SBA. Short Term Goal 3: Ambulate 48' with walker with CGA.        Therapy Time   Individual Concurrent Group Co-treatment   Time In 1157         Time Out 1228         Minutes 31         Timed Code Treatment Minutes: [address] [place], [place]

## 2023-09-06 NOTE — PROGRESS NOTES
[facility]  Internal Medicine Teaching Residency Program  Inpatient Daily Progress Note  ______________________________________________________________________________    Patient: Alejandro Tierney  YOB: 1940   [id number]:[id number]    Acct: [de-identified]     Room: [address] [place] [address]  Admit date: 8/10/2023  Today's date: 09/06/23  Number of days in the hospital: 32    SUBJECTIVE   Admitting Diagnosis: Delirium  CC:Altered Mental Status     -Pt seen and examined at bedside. Chart & results reviewed. -Patient was lying comfortably in bed  -Patient is afebrile and hemodynamically stable  -Patient seems to be slightly anxious, as baseline  -Patient is still awaiting pre-CERT approval for SNF/[facility] placement, pre-cert started yesterday.  -patient worked with PT/OT yesterday. Review of Systems   Constitutional:  Positive for activity change. Negative for fatigue. HENT:  Negative for congestion and rhinorrhea. Eyes:  Negative for discharge and itching. Respiratory:  Negative for cough and shortness of breath. Cardiovascular:  Negative for chest pain. Gastrointestinal:  Negative for diarrhea, nausea and vomiting. Genitourinary:  Negative for flank pain. Croft catheter in place   Skin:  Negative for color change. Neurological:  Negative for facial asymmetry (Right-sided eyelid droop) and headaches. Psychiatric/Behavioral:  Positive for agitation. Negative for behavioral problems and confusion. BRIEF HISTORY     The patient is a 80 y.o. female with past medical history of generalized anxiety disorder, parkinsonism, colitis, acute urinary retention, OLIVA presented  with altered mental status. As per her son the patient is being disoriented for more than 24 hours in the nursing facility. As per the medical record she was admitted in the hospital on 7/2/2023 for the management of generalized anxiety disorder.   The patient had appearance. HENT:      Head: Normocephalic and atraumatic. Cardiovascular:      Rate and Rhythm: Normal rate and regular rhythm. Pulses: Normal pulses. Heart sounds: Normal heart sounds. Pulmonary:      Effort: Pulmonary effort is normal.      Breath sounds: Normal breath sounds. No wheezing or rales. Abdominal:      General: Abdomen is flat. There is no distension. Musculoskeletal:         General: Normal range of motion. Right lower leg: No edema. Left lower leg: No edema. Neurological:      Mental Status: She is alert and oriented to person, place, and time. Mental status is at baseline. Medications:  Scheduled Medications:    mirtazapine  15 mg Oral Nightly    clonazePAM  0.25 mg Oral BID    propranolol  20 mg Oral BID    atorvastatin  20 mg Oral Daily    sennosides-docusate sodium  2 tablet Oral Daily    busPIRone  15 mg Oral TID    DULoxetine  60 mg Oral BID    sodium chloride flush  5-40 mL IntraVENous 2 times per day    enoxaparin  40 mg SubCUTAneous Daily    QUEtiapine  25 mg Oral Nightly     Continuous Infusions:    dextrose      sodium chloride 10 mL/hr at 08/12/23 1458     PRN Medicationslabetalol, 10 mg, Q4H PRN  glucose, 4 tablet, PRN  dextrose bolus, 125 mL, PRN   Or  dextrose bolus, 250 mL, PRN  glucagon (rDNA), 1 mg, PRN  dextrose, , Continuous PRN  haloperidol lactate, 5 mg, Q6H PRN  sodium chloride flush, 5-40 mL, PRN  sodium chloride, , PRN  ondansetron, 4 mg, Q8H PRN   Or  ondansetron, 4 mg, Q6H PRN  polyethylene glycol, 17 g, Daily PRN  acetaminophen, 650 mg, Q6H PRN   Or  acetaminophen, 650 mg, Q6H PRN        Diagnostic Labs:  CBC:   Recent Labs     09/05/23  0745   WBC 5.2   RBC 3.43*   HGB 11.0*   HCT 33.6*   MCV 98.0   RDW 13.7        BMP:   Recent Labs     09/05/23  0745      K 3.5*      CO2 26   BUN 14   CREATININE 0.5     BNP: No results for input(s): BNP in the last 72 hours.   PT/INR: No results for input(s): PROTIME, INR in the

## 2023-09-06 NOTE — PLAN OF CARE
Bedside and Verbal shift change report given to CANDI Coy RN (oncoming nurse) by ROBI Askew RN (offgoing nurse). Report included the following information SBAR, Kardex, Intake/Output and MAR. Problem: Discharge Planning  Goal: Discharge to home or other facility with appropriate resources  Outcome: Progressing     Problem: Skin/Tissue Integrity  Goal: Absence of new skin breakdown  Description: 1. Monitor for areas of redness and/or skin breakdown  2. Assess vascular access sites hourly  3. Every 4-6 hours minimum:  Change oxygen saturation probe site  4. Every 4-6 hours:  If on nasal continuous positive airway pressure, respiratory therapy assess nares and determine need for appliance change or resting period. Outcome: Progressing     Problem: Safety - Adult  Goal: Free from fall injury  Outcome: Progressing     Problem: ABCDS Injury Assessment  Goal: Absence of physical injury  Outcome: Progressing     Problem: Confusion  Goal: Confusion, delirium, dementia, or psychosis is improved or at baseline  Description: INTERVENTIONS:  1. Assess for possible contributors to thought disturbance, including medications, impaired vision or hearing, underlying metabolic abnormalities, dehydration, psychiatric diagnoses, and notify attending LIP  2. [place] high risk fall precautions, as indicated  3. Provide frequent short contacts to provide reality reorientation, refocusing and direction  4. Decrease environmental stimuli, including noise as appropriate  5. Monitor and intervene to maintain adequate nutrition, hydration, elimination, sleep and activity  6. If unable to ensure safety without constant attention obtain sitter and review sitter guidelines with assigned personnel  7.  Initiate Psychosocial CNS and Spiritual Care consult, as indicated  Outcome: Progressing     Problem: Nutrition Deficit:  Goal: Optimize nutritional status  Outcome: Progressing     Problem: Pain  Goal: Verbalizes/displays adequate comfort level or baseline comfort level  Outcome: Progressing

## 2023-09-06 NOTE — PLAN OF CARE
Problem: Discharge Planning  Goal: Discharge to home or other facility with appropriate resources  9/6/2023 0103 by Elba Collet, RN  Outcome: Progressing     Problem: Skin/Tissue Integrity  Goal: Absence of new skin breakdown  Description: 1. Monitor for areas of redness and/or skin breakdown  2. Assess vascular access sites hourly  3. Every 4-6 hours minimum:  Change oxygen saturation probe site  4. Every 4-6 hours:  If on nasal continuous positive airway pressure, respiratory therapy assess nares and determine need for appliance change or resting period. 9/6/2023 0103 by Elba Collet, RN  Outcome: Progressing     Problem: Safety - Adult  Goal: Free from fall injury  9/6/2023 0103 by Elba Collet, RN  Outcome: Progressing     Problem: ABCDS Injury Assessment  Goal: Absence of physical injury  9/6/2023 0103 by Elba Collet, RN  Outcome: Progressing     Problem: Confusion  Goal: Confusion, delirium, dementia, or psychosis is improved or at baseline  Description: INTERVENTIONS:  1. Assess for possible contributors to thought disturbance, including medications, impaired vision or hearing, underlying metabolic abnormalities, dehydration, psychiatric diagnoses, and notify attending LIP  2. [place] high risk fall precautions, as indicated  3. Provide frequent short contacts to provide reality reorientation, refocusing and direction  4. Decrease environmental stimuli, including noise as appropriate  5. Monitor and intervene to maintain adequate nutrition, hydration, elimination, sleep and activity  6. If unable to ensure safety without constant attention obtain sitter and review sitter guidelines with assigned personnel  7.  Initiate Psychosocial CNS and Spiritual Care consult, as indicated  9/6/2023 0103 by Elba Collet, RN  Outcome: Progressing     Problem: Nutrition Deficit:  Goal: Optimize nutritional status  9/6/2023 0103 by Elba Collet, RN  Outcome: Progressing     Problem: Pain  Goal: Verbalizes/displays adequate comfort level or baseline comfort level  9/6/2023 0103 by Bertrand Lombard, RN  Outcome: Progressing

## 2023-09-06 NOTE — CARE COORDINATION
Transitional planning      Writer placed call to [facility], left vm on admissions line with request for call back.

## 2023-09-07 VITALS
WEIGHT: 113.6 LBS | HEART RATE: 95 BPM | TEMPERATURE: 98.6 F | SYSTOLIC BLOOD PRESSURE: 124 MMHG | BODY MASS INDEX: 19.39 KG/M2 | OXYGEN SATURATION: 99 % | RESPIRATION RATE: 20 BRPM | DIASTOLIC BLOOD PRESSURE: 88 MMHG | HEIGHT: 64 IN

## 2023-09-07 PROCEDURE — 99232 SBSQ HOSP IP/OBS MODERATE 35: CPT | Performed by: INTERNAL MEDICINE

## 2023-09-07 PROCEDURE — APPSS30 APP SPLIT SHARED TIME 16-30 MINUTES: Performed by: NURSE PRACTITIONER

## 2023-09-07 PROCEDURE — 6370000000 HC RX 637 (ALT 250 FOR IP): Performed by: INTERNAL MEDICINE

## 2023-09-07 PROCEDURE — 6360000002 HC RX W HCPCS

## 2023-09-07 PROCEDURE — 6370000000 HC RX 637 (ALT 250 FOR IP)

## 2023-09-07 RX ADMIN — CLONAZEPAM 0.25 MG: 0.5 TABLET ORAL at 19:56

## 2023-09-07 RX ADMIN — DULOXETINE HYDROCHLORIDE 60 MG: 30 CAPSULE, DELAYED RELEASE ORAL at 08:39

## 2023-09-07 RX ADMIN — PROPRANOLOL HYDROCHLORIDE 20 MG: 20 TABLET ORAL at 19:58

## 2023-09-07 RX ADMIN — ENOXAPARIN SODIUM 40 MG: 100 INJECTION SUBCUTANEOUS at 08:39

## 2023-09-07 RX ADMIN — BUSPIRONE HYDROCHLORIDE 15 MG: 15 TABLET ORAL at 19:57

## 2023-09-07 RX ADMIN — BUSPIRONE HYDROCHLORIDE 15 MG: 15 TABLET ORAL at 14:53

## 2023-09-07 RX ADMIN — DULOXETINE HYDROCHLORIDE 60 MG: 30 CAPSULE, DELAYED RELEASE ORAL at 19:58

## 2023-09-07 RX ADMIN — STANDARDIZED SENNA CONCENTRATE AND DOCUSATE SODIUM 2 TABLET: 8.6; 5 TABLET ORAL at 08:39

## 2023-09-07 RX ADMIN — BUSPIRONE HYDROCHLORIDE 15 MG: 15 TABLET ORAL at 08:39

## 2023-09-07 RX ADMIN — ATORVASTATIN CALCIUM 20 MG: 20 TABLET, FILM COATED ORAL at 08:39

## 2023-09-07 RX ADMIN — QUETIAPINE FUMARATE 25 MG: 25 TABLET ORAL at 19:58

## 2023-09-07 RX ADMIN — PROPRANOLOL HYDROCHLORIDE 20 MG: 20 TABLET ORAL at 08:40

## 2023-09-07 RX ADMIN — MIRTAZAPINE 15 MG: 15 TABLET, ORALLY DISINTEGRATING ORAL at 19:56

## 2023-09-07 RX ADMIN — CLONAZEPAM 0.25 MG: 0.5 TABLET ORAL at 08:40

## 2023-09-07 ASSESSMENT — PAIN SCALES - GENERAL: PAINLEVEL_OUTOF10: 0

## 2023-09-07 NOTE — CARE COORDINATION
[id number]  [address] Flow/Interdisciplinary Rounds Progress Note    Quality Flow Rounds held on September 7, 2023 at [facility] Attending:  Bedside Nurse, , and Nursing Unit Leadership      Anticipated Discharge Disposition:SNF    Readmission Risk              Risk of Unplanned Readmission:  24           Discussed patient goal for the day, patient clinical progression, and barriers to discharge.   The following Goal(s) of the Day/Commitment(s) have been identified:      Called jose elias with [facility] to f/u on precert left vm      07:36 called jose elias with [facility] left 2nd message  Return call still await precert      99:35 call from joana with [facility] have Ashu Seats Dr Dr Thalia Marshall to update  [facility] not available till tomorrow    [address]  @ 19:30   Call report to [phone]  Faxed orders to [facility]  Met with patient to update  Called son Shelli Curtis and daughter in law abel left 's  Call back from [address] [place] [address] to confirm receipt of message      Rey Do RN  September 7, 2023

## 2023-09-07 NOTE — PROGRESS NOTES
[facility] - Progress Note    Today's Date and Time: 9/7/2023, 9:33 AM    Impression :   Altered mentation-better  Recent hx MRSA UTI on 7/27/23-  Treated with Macrobid  Urinary retention  Hypokalemia  GERD  Anxiety  Parkinson's    Recommendations:   Monitor off antibiotics   D/C IV Vancomycin- No significant growth on urine culture from 8/10/23  Urology recommendations  Stable for D/C from ID perspective    Medical Decision Making/Summary/Discussion:9/7/2023       Infection Control Recommendations   Oil Trough Precautions  Contact Precautions for MRSA hx    Antimicrobial Stewardship Recommendations     Discontinuation of therapy  Coordination of Outpatient Care:   Estimated Length of IV antimicrobials:NA  Patient will need Midline Catheter Insertion: No  Patient will need PICC line Insertion:No  Patient will need: Home IV , [address] [facility],  SNF,  LTAC: No  Patient will need outpatient wound care: No    Chief complaint/reason for consultation:   MRSA UTI      History of Present Illness:   Derrick Stephenson is a 80y.o.-year-old  female who was initially admitted on 8/10/2023. Patient seen at the request of Dr. Cherry Pena:    This patient, who sees Dr. Angie Osei, with Urology, for urinary retention, and was treated for a MRSA UTI with Macrobid at the end of July 2023, presented to the ED from her nursing home with confusion x 24 hrs on 8/10/23. She has a history of anxiety that is treated with Klonopin. Her right pupil was noted to be dilated. A CT of her head was negative for any acute abnormalities. Labwork was mostly unremarkable other than hypokalemia and slightly elevated creatinine. The patient was given fluids and was administered a dose of IV Rocephin and was initiated on IV Vancomycin. Blood cultures have yielded no growth thus far and the urine culture shows no significant growth.      Urology was consulted and a CT abd/pelvis was in the last 72 hours. No results for input(s): RPR in the last 72 hours. No results for input(s): HIV in the last 72 hours. No results for input(s): BC in the last 72 hours. Lab Results   Component Value Date/Time    MUCUS 1+ 08/10/2023 11:15 AM    RBC 3.43 09/05/2023 07:45 AM    TRICHOMONAS NOT REPORTED 09/12/2015 04:48 PM    WBC 5.2 09/05/2023 07:45 AM    YEAST NOT REPORTED 09/12/2015 04:48 PM    TURBIDITY Clear 08/10/2023 11:15 AM     Lab Results   Component Value Date/Time    CREATININE 0.5 09/05/2023 07:45 AM    GLUCOSE 97 09/05/2023 07:45 AM       Medical Decision Making-Imaging:   CT HEAD WO CONTRAST    Result Date: 8/10/2023  EXAMINATION: CT OF THE HEAD WITHOUT CONTRAST  8/10/2023 11:35 am TECHNIQUE: CT of the head was performed without the administration of intravenous contrast. Automated exposure control, iterative reconstruction, and/or weight based adjustment of the mA/kV was utilized to reduce the radiation dose to as low as reasonably achievable. COMPARISON: None. HISTORY: ORDERING SYSTEM PROVIDED HISTORY: AMS TECHNOLOGIST PROVIDED HISTORY: AMS Decision Support Exception - unselect if not a suspected or confirmed emergency medical condition->Emergency Medical Condition (MA) Reason for Exam: ams FINDINGS: BRAIN/VENTRICLES: There is no acute intracranial hemorrhage, mass effect or midline shift. No abnormal extra-axial fluid collection. The gray-white differentiation is maintained without evidence of an acute infarct. There is no evidence of hydrocephalus. Unchanged enlarged frontal dural spaces, mild cortical volume loss, and scattered low-attenuation white matter abnormalities. ORBITS: The visualized portion of the orbits demonstrate no acute abnormality. Status post right cataract surgery. SINUSES: The visualized paranasal sinuses and mastoid air cells demonstrate no acute abnormality. SOFT TISSUES/SKULL:  No acute abnormality of the visualized skull or soft tissues.   Unchanged hyperostosis

## 2023-09-07 NOTE — PLAN OF CARE
Problem: Discharge Planning  Goal: Discharge to home or other facility with appropriate resources  9/7/2023 1719 by Mane Hughes RN  Outcome: Progressing  9/7/2023 0507 by Michael Squires RN  Outcome: Progressing     Problem: Skin/Tissue Integrity  Goal: Absence of new skin breakdown  Description: 1. Monitor for areas of redness and/or skin breakdown  2. Assess vascular access sites hourly  3. Every 4-6 hours minimum:  Change oxygen saturation probe site  4. Every 4-6 hours:  If on nasal continuous positive airway pressure, respiratory therapy assess nares and determine need for appliance change or resting period. 9/7/2023 1719 by Mane Hughes RN  Outcome: Progressing  9/7/2023 0507 by Michael Squires RN  Outcome: Progressing     Problem: Safety - Adult  Goal: Free from fall injury  9/7/2023 1719 by Mane Hughes RN  Outcome: Progressing  9/7/2023 0507 by Michael Squires RN  Outcome: Progressing     Problem: ABCDS Injury Assessment  Goal: Absence of physical injury  9/7/2023 1719 by Mane Hughes RN  Outcome: Progressing  9/7/2023 0507 by Michael Squires RN  Outcome: Progressing     Problem: Confusion  Goal: Confusion, delirium, dementia, or psychosis is improved or at baseline  Description: INTERVENTIONS:  1. Assess for possible contributors to thought disturbance, including medications, impaired vision or hearing, underlying metabolic abnormalities, dehydration, psychiatric diagnoses, and notify attending LIP  2. [place] high risk fall precautions, as indicated  3. Provide frequent short contacts to provide reality reorientation, refocusing and direction  4. Decrease environmental stimuli, including noise as appropriate  5. Monitor and intervene to maintain adequate nutrition, hydration, elimination, sleep and activity  6. If unable to ensure safety without constant attention obtain sitter and review sitter guidelines with assigned personnel  7.  Initiate Psychosocial CNS and Spiritual Care consult, as indicated  9/7/2023 1719 by Leeanne Shipman RN  Outcome: Progressing  9/7/2023 0507 by Karime Curiel RN  Outcome: Progressing     Problem: Nutrition Deficit:  Goal: Optimize nutritional status  9/7/2023 1719 by Leeanne Shipman RN  Outcome: Progressing  9/7/2023 0507 by Karime Curiel RN  Outcome: Progressing     Problem: Pain  Goal: Verbalizes/displays adequate comfort level or baseline comfort level  9/7/2023 1719 by Leeanne Shipman RN  Outcome: Progressing  9/7/2023 0507 by Karime Curiel RN  Outcome: Progressing

## 2023-09-07 NOTE — PROGRESS NOTES
[facility]  Internal Medicine Teaching Residency Program  Inpatient Daily Progress Note  ______________________________________________________________________________    Patient: Edilberto Lazo  YOB: 1940   GBE:[id number]    Acct: [de-identified]     Room: [address] [place] [address]  Admit date: 8/10/2023  Today's date: 09/07/23  Number of days in the hospital: 28    SUBJECTIVE   Admitting Diagnosis: Delirium  CC:Altered Mental Status     -Pt seen and examined at bedside. Chart & results reviewed. -Patient was lying comfortably in bed  -Patient is afebrile and hemodynamically stable  -Patient seems to be slightly anxious, as baseline  -Patient is still awaiting pre-CERT approval for SNF  -patient worked with PT/OT              Review of Systems   Constitutional:  Positive for activity change. Negative for fatigue. HENT:  Negative for congestion and rhinorrhea. Eyes:  Negative for discharge and itching. Respiratory:  Negative for cough and shortness of breath. Cardiovascular:  Negative for chest pain. Gastrointestinal:  Negative for diarrhea, nausea and vomiting. Genitourinary:  Negative for flank pain. Croft catheter in place   Skin:  Negative for color change. Neurological:  Negative for facial asymmetry (Right-sided eyelid droop) and headaches. Psychiatric/Behavioral:  Positive for agitation. Negative for behavioral problems and confusion. BRIEF HISTORY     The patient is a 80 y.o. female with past medical history of generalized anxiety disorder, parkinsonism, colitis, acute urinary retention, OLIVA presented  with altered mental status. As per her son the patient is being disoriented for more than 24 hours in the nursing facility. As per the medical record she was admitted in the hospital on 7/2/2023 for the management of generalized anxiety disorder.   The patient had repeated episodes of anxiety and tachycardia throughout her 40 Mg     PT/OT : on board, appreciate recs  Discharge planning: SNF placement pending, precert at [facility] started    Jarvis Kim M.D. Internal Medicine Resident PGY-1  [facility],  [place], [place].    9:19 AM 9/7/2023     Please note that part of this chart was generated using voice recognition dictation software. Although every effort was made to ensure the accuracy of this automated transcription, some errors in transcription may have occurred.

## 2023-09-07 NOTE — PLAN OF CARE
Problem: Discharge Planning  Goal: Discharge to home or other facility with appropriate resources  9/7/2023 0507 by Haile Bajwa RN  Outcome: Progressing  9/6/2023 1639 by Sheridan Jalloh RN  Outcome: Progressing     Problem: Skin/Tissue Integrity  Goal: Absence of new skin breakdown  Description: 1. Monitor for areas of redness and/or skin breakdown  2. Assess vascular access sites hourly  3. Every 4-6 hours minimum:  Change oxygen saturation probe site  4. Every 4-6 hours:  If on nasal continuous positive airway pressure, respiratory therapy assess nares and determine need for appliance change or resting period. 9/7/2023 0507 by Haile Bajwa RN  Outcome: Progressing  9/6/2023 1639 by Sheridan Jalloh RN  Outcome: Progressing     Problem: Safety - Adult  Goal: Free from fall injury  9/7/2023 0507 by Haile Bajwa RN  Outcome: Progressing  9/6/2023 1639 by Sheridan Jalloh RN  Outcome: Progressing     Problem: ABCDS Injury Assessment  Goal: Absence of physical injury  9/7/2023 0507 by Haile Bajwa RN  Outcome: Progressing  9/6/2023 1639 by Sheridan Jalloh RN  Outcome: Progressing     Problem: Confusion  Goal: Confusion, delirium, dementia, or psychosis is improved or at baseline  Description: INTERVENTIONS:  1. Assess for possible contributors to thought disturbance, including medications, impaired vision or hearing, underlying metabolic abnormalities, dehydration, psychiatric diagnoses, and notify attending LIP  2. [place] high risk fall precautions, as indicated  3. Provide frequent short contacts to provide reality reorientation, refocusing and direction  4. Decrease environmental stimuli, including noise as appropriate  5. Monitor and intervene to maintain adequate nutrition, hydration, elimination, sleep and activity  6. If unable to ensure safety without constant attention obtain sitter and review sitter guidelines with assigned personnel  7.  Initiate Psychosocial

## 2023-09-08 NOTE — PROGRESS NOTES
SBAR report called to [facility]. Patient left with transport team via gurney in no apparent distress. Confirmed that IV access and heart monitoring equipment were removed without incident prior to leaving. All belongings were accounted for upon exit.

## 2023-09-10 NOTE — DISCHARGE SUMMARY
[address]     Department of Internal Medicine - Staff Internal Medicine Teaching Service    INPATIENT DISCHARGE SUMMARY      Patient Identification:  Sade Fischer is a 80 y.o. female. :  1940  MRN: [id number]     Acct: [de-identified]   PCP: Reshma Cheng MD  Admit Date:  8/10/2023  Discharge date and time: 2023 10:45 AM   Attending Provider: No att. providers found                                      [facility] [address] Problem Lists:  Principal Problem:    Delirium  Active Problems:    OUSMANE (generalized anxiety disorder)    Urinary incontinence without sensory awareness    Altered mental status    Hypokalemia    Parkinson's disease ([address])    Confusion    Severe protein-calorie malnutrition (HCC)  Resolved Problems:    OLIVA (acute kidney injury) ([facility])    Dehydration    MRSA infection    Urinary retention    Catheter-associated urinary tract infection ([facility])    Acute metabolic encephalopathy      HOSPITAL STAY     Brief Inpatient course:   Sade Fischer is a 80 y.o. female who was admitted for the management of Delirium, presented to the emergency department with altered mental status. As per her son the patient is being disoriented for more than 24 hours in the nursing facility. As per the medical record she was admitted in the hospital on 2023 for the management of generalized anxiety disorder. The patient had repeated episodes of anxiety and tachycardia throughout her hospital stay and she was managed with Ativan and Seroquel as needed, later it was changed to Klonopin by psychiatrist.  During her hospital stay she had a colitis episode for which she was treated with antibiotics and was catheterized with the Croft for acute urinary retention and she was asked to follow-up with urology outpatient for [facility].   On 2023 patient presented to the urology office for evaluation of urinary retention, at that time as per urology note the within 5 to 7 days for continued care. Please feel free return to the hospital if your symptoms worsen or any new concerning symptoms develop. Follow-up with your primary care physician as needed for all other concerns.       Follow up labs: None  Follow up imaging: None    Note that over 30 minutes was spent in preparing discharge papers, discussing discharge with patient, medication review, etc.      Bonita Taylor MD, MD  Internal Medicine Resident, PGY-1  [address],  [place], [place].  9/10/2023, 9:14 AM

## 2023-09-12 ENCOUNTER — HOSPITAL ENCOUNTER (OUTPATIENT)
Age: 83
Setting detail: SPECIMEN
Discharge: HOME | End: 2023-09-12

## 2023-09-12 LAB
ALBUMIN SERPL-MCNC: 3.9 G/DL (ref 3.5–5.2)
ALBUMIN/GLOB SERPL: 1.5 {RATIO} (ref 1–2.5)
ALP SERPL-CCNC: 70 U/L (ref 35–104)
ALT SERPL-CCNC: 18 U/L (ref 5–33)
ANION GAP SERPL CALCULATED.3IONS-SCNC: 13 MMOL/L (ref 9–17)
AST SERPL-CCNC: 36 U/L
BILIRUB SERPL-MCNC: 0.4 MG/DL (ref 0.3–1.2)
BUN SERPL-MCNC: 12 MG/DL (ref 8–23)
CALCIUM SERPL-MCNC: 10 MG/DL (ref 8.6–10.4)
CHLORIDE SERPL-SCNC: 103 MMOL/L (ref 98–107)
CO2 SERPL-SCNC: 24 MMOL/L (ref 20–31)
CREAT SERPL-MCNC: 0.5 MG/DL (ref 0.5–0.9)
ERYTHROCYTE [DISTWIDTH] IN BLOOD BY AUTOMATED COUNT: 14.1 % (ref 11.8–14.4)
GFR SERPL CREATININE-BSD FRML MDRD: >60 ML/MIN/1.73M2
GLUCOSE SERPL-MCNC: 85 MG/DL (ref 70–99)
HCT VFR BLD AUTO: 38.8 % (ref 36.3–47.1)
HGB BLD-MCNC: 12.7 G/DL (ref 11.9–15.1)
MCH RBC QN AUTO: 31.4 PG (ref 25.2–33.5)
MCHC RBC AUTO-ENTMCNC: 32.7 G/DL (ref 28.4–34.8)
MCV RBC AUTO: 95.8 FL (ref 82.6–102.9)
NRBC BLD-RTO: 0 PER 100 WBC
PLATELET # BLD AUTO: 353 K/UL (ref 138–453)
PMV BLD AUTO: 10.5 FL (ref 8.1–13.5)
POTASSIUM SERPL-SCNC: 4.1 MMOL/L (ref 3.7–5.3)
PROT SERPL-MCNC: 6.5 G/DL (ref 6.4–8.3)
RBC # BLD AUTO: 4.05 M/UL (ref 3.95–5.11)
SODIUM SERPL-SCNC: 140 MMOL/L (ref 135–144)
WBC OTHER # BLD: 7 K/UL (ref 3.5–11.3)

## 2023-09-12 PROCEDURE — 85027 COMPLETE CBC AUTOMATED: CPT

## 2023-09-12 PROCEDURE — P9603 ONE-WAY ALLOW PRORATED MILES: HCPCS

## 2023-09-12 PROCEDURE — 36415 COLL VENOUS BLD VENIPUNCTURE: CPT

## 2023-09-12 PROCEDURE — 80053 COMPREHEN METABOLIC PANEL: CPT

## 2023-09-19 ENCOUNTER — HOSPITAL ENCOUNTER (OUTPATIENT)
Age: 83
Setting detail: SPECIMEN
Discharge: HOME | End: 2023-09-19

## 2023-09-19 LAB
ALBUMIN SERPL-MCNC: 4 G/DL (ref 3.5–5.2)
ALBUMIN/GLOB SERPL: 1.7 {RATIO} (ref 1–2.5)
ALP SERPL-CCNC: 66 U/L (ref 35–104)
ALT SERPL-CCNC: 13 U/L (ref 5–33)
ANION GAP SERPL CALCULATED.3IONS-SCNC: 9 MMOL/L (ref 9–17)
AST SERPL-CCNC: 15 U/L
BILIRUB SERPL-MCNC: 0.4 MG/DL (ref 0.3–1.2)
BUN SERPL-MCNC: 10 MG/DL (ref 8–23)
CALCIUM SERPL-MCNC: 9.8 MG/DL (ref 8.6–10.4)
CHLORIDE SERPL-SCNC: 104 MMOL/L (ref 98–107)
CO2 SERPL-SCNC: 27 MMOL/L (ref 20–31)
CREAT SERPL-MCNC: 0.5 MG/DL (ref 0.5–0.9)
ERYTHROCYTE [DISTWIDTH] IN BLOOD BY AUTOMATED COUNT: 14.3 % (ref 11.8–14.4)
GFR SERPL CREATININE-BSD FRML MDRD: >60 ML/MIN/1.73M2
GLUCOSE SERPL-MCNC: 99 MG/DL (ref 70–99)
HCT VFR BLD AUTO: 37.6 % (ref 36.3–47.1)
HGB BLD-MCNC: 12.3 G/DL (ref 11.9–15.1)
MCH RBC QN AUTO: 31.3 PG (ref 25.2–33.5)
MCHC RBC AUTO-ENTMCNC: 32.7 G/DL (ref 28.4–34.8)
MCV RBC AUTO: 95.7 FL (ref 82.6–102.9)
NRBC BLD-RTO: 0 PER 100 WBC
PLATELET # BLD AUTO: 320 K/UL (ref 138–453)
PMV BLD AUTO: 10 FL (ref 8.1–13.5)
POTASSIUM SERPL-SCNC: 3.3 MMOL/L (ref 3.7–5.3)
PROT SERPL-MCNC: 6.4 G/DL (ref 6.4–8.3)
RBC # BLD AUTO: 3.93 M/UL (ref 3.95–5.11)
SODIUM SERPL-SCNC: 140 MMOL/L (ref 135–144)
WBC OTHER # BLD: 8.1 K/UL (ref 3.5–11.3)

## 2023-09-19 PROCEDURE — P9603 ONE-WAY ALLOW PRORATED MILES: HCPCS

## 2023-09-19 PROCEDURE — 80053 COMPREHEN METABOLIC PANEL: CPT

## 2023-09-19 PROCEDURE — 85027 COMPLETE CBC AUTOMATED: CPT

## 2023-09-19 PROCEDURE — 36415 COLL VENOUS BLD VENIPUNCTURE: CPT

## 2023-09-20 ENCOUNTER — OFFICE VISIT (OUTPATIENT)
Dept: NEUROLOGY | Age: 83
End: 2023-09-20
Payer: MEDICARE

## 2023-09-20 VITALS
HEART RATE: 100 BPM | BODY MASS INDEX: 17.07 KG/M2 | HEIGHT: 64 IN | WEIGHT: 100 LBS | DIASTOLIC BLOOD PRESSURE: 64 MMHG | OXYGEN SATURATION: 93 % | SYSTOLIC BLOOD PRESSURE: 95 MMHG

## 2023-09-20 DIAGNOSIS — G21.19 DRUG-INDUCED PARKINSONISM (HCC): Primary | ICD-10-CM

## 2023-09-20 PROCEDURE — 99204 OFFICE O/P NEW MOD 45 MIN: CPT

## 2023-09-20 PROCEDURE — 1123F ACP DISCUSS/DSCN MKR DOCD: CPT

## 2023-09-20 ASSESSMENT — ENCOUNTER SYMPTOMS
CHOKING: 0
ABDOMINAL DISTENTION: 0
APNEA: 0
ABDOMINAL PAIN: 0

## 2023-09-20 NOTE — PROGRESS NOTES
[address]  [facility] [place] [address]  Dept: [phone]  Dept Fax: [phone]    NEUROLOGY NEW PATIENT NOTE       PATIENT NAME: Farida Lopez  PATIENT MRN: [id number]  PRIMARY CARE PHYSICIAN: WALLY Kamara MD    HPI:      Farida Lopez is a 80 y.o. female with PMH of generalized anxiety disorder, HLD, orthostatic hypotension who resides at nursing facility who presented today as follow up appointment after her resent hospitalization due to delirium/ agitation and tremor. Her symptoms were believed to be related to polypharmacy with multiple CNS acting medications including Atarax, Cymbalta, Zyprexa and Xanax. At this time she takes Buspirone, Cymbalta. On today's evaluation she is alert and oriented x3, appears to be extremely anxious with hard breathing, she tries to leave the room and is not cooperative with exam and with providing the history. Neurological exam is significant for moderate cogwheel rigidity in all muscle groups of upper extremities, wrist rest tremor that is non sustainable. Patient mostly ambulates with the wheelchair due to muscle weakness. Persistence of extrapyramidal symptoms after discontinuation of an offensive agents does not rule out the diagnosis of DIP since symptoms may persist up to 2 years.  The symmetrical presentation faring DIP diagnosis, however, primary PD can not be completely ruled out           PREVIOUS WORKUP:     Lab Results   Component Value Date    WBC 8.1 09/19/2023    HGB 12.3 09/19/2023    HCT 37.6 09/19/2023    MCV 95.7 09/19/2023     09/19/2023       Past Medical History:   Diagnosis Date    OLIVA (acute kidney injury) ([address]) 7/8/2023    Anxiety and depression     Depression     GERD (gastroesophageal reflux disease)     Weight loss         Past Surgical History:   Procedure Laterality Date    BREAST BIOPSY Right 2010    COCCYX REMOVAL  09/01/1950    COLONOSCOPY

## 2023-09-26 ENCOUNTER — HOSPITAL ENCOUNTER (OUTPATIENT)
Age: 83
Setting detail: SPECIMEN
Discharge: HOME | End: 2023-09-26

## 2023-09-26 LAB
ALBUMIN SERPL-MCNC: 4.3 G/DL (ref 3.5–5.2)
ALBUMIN/GLOB SERPL: 1.7 {RATIO} (ref 1–2.5)
ALP SERPL-CCNC: 71 U/L (ref 35–104)
ALT SERPL-CCNC: 11 U/L (ref 5–33)
ANION GAP SERPL CALCULATED.3IONS-SCNC: 12 MMOL/L (ref 9–17)
AST SERPL-CCNC: 16 U/L
BILIRUB SERPL-MCNC: 0.4 MG/DL (ref 0.3–1.2)
BUN SERPL-MCNC: 12 MG/DL (ref 8–23)
CALCIUM SERPL-MCNC: 10 MG/DL (ref 8.6–10.4)
CHLORIDE SERPL-SCNC: 98 MMOL/L (ref 98–107)
CO2 SERPL-SCNC: 27 MMOL/L (ref 20–31)
CREAT SERPL-MCNC: 0.5 MG/DL (ref 0.5–0.9)
ERYTHROCYTE [DISTWIDTH] IN BLOOD BY AUTOMATED COUNT: 14.1 % (ref 11.8–14.4)
GFR SERPL CREATININE-BSD FRML MDRD: >60 ML/MIN/1.73M2
GLUCOSE SERPL-MCNC: 88 MG/DL (ref 70–99)
HCT VFR BLD AUTO: 38.9 % (ref 36.3–47.1)
HGB BLD-MCNC: 12.6 G/DL (ref 11.9–15.1)
MCH RBC QN AUTO: 31.2 PG (ref 25.2–33.5)
MCHC RBC AUTO-ENTMCNC: 32.4 G/DL (ref 28.4–34.8)
MCV RBC AUTO: 96.3 FL (ref 82.6–102.9)
NRBC BLD-RTO: 0 PER 100 WBC
PLATELET # BLD AUTO: 324 K/UL (ref 138–453)
PMV BLD AUTO: 10.3 FL (ref 8.1–13.5)
POTASSIUM SERPL-SCNC: 3.1 MMOL/L (ref 3.7–5.3)
PROT SERPL-MCNC: 6.8 G/DL (ref 6.4–8.3)
RBC # BLD AUTO: 4.04 M/UL (ref 3.95–5.11)
SODIUM SERPL-SCNC: 137 MMOL/L (ref 135–144)
WBC OTHER # BLD: 8.5 K/UL (ref 3.5–11.3)

## 2023-09-26 PROCEDURE — 85027 COMPLETE CBC AUTOMATED: CPT

## 2023-09-26 PROCEDURE — 80053 COMPREHEN METABOLIC PANEL: CPT

## 2023-09-26 PROCEDURE — P9603 ONE-WAY ALLOW PRORATED MILES: HCPCS

## 2023-09-26 PROCEDURE — 36415 COLL VENOUS BLD VENIPUNCTURE: CPT

## 2023-10-03 ENCOUNTER — HOSPITAL ENCOUNTER (OUTPATIENT)
Age: 83
Setting detail: SPECIMEN
Discharge: HOME | End: 2023-10-03
Payer: MEDICARE

## 2023-10-03 LAB — POTASSIUM SERPL-SCNC: 3.8 MMOL/L (ref 3.7–5.3)

## 2023-10-03 PROCEDURE — P9603 ONE-WAY ALLOW PRORATED MILES: HCPCS

## 2023-10-03 PROCEDURE — 84132 ASSAY OF SERUM POTASSIUM: CPT

## 2023-10-03 PROCEDURE — 36415 COLL VENOUS BLD VENIPUNCTURE: CPT

## 2023-10-12 ENCOUNTER — OFFICE VISIT (OUTPATIENT)
Dept: UROLOGY | Age: 83
End: 2023-10-12
Payer: MEDICARE

## 2023-10-12 VITALS
WEIGHT: 100 LBS | BODY MASS INDEX: 17.07 KG/M2 | HEIGHT: 64 IN | HEART RATE: 100 BPM | DIASTOLIC BLOOD PRESSURE: 64 MMHG | SYSTOLIC BLOOD PRESSURE: 100 MMHG

## 2023-10-12 DIAGNOSIS — R33.9 RETENTION OF URINE: ICD-10-CM

## 2023-10-12 DIAGNOSIS — R35.0 FREQUENCY OF MICTURITION: Primary | ICD-10-CM

## 2023-10-12 DIAGNOSIS — N39.42 URINARY INCONTINENCE WITHOUT SENSORY AWARENESS: ICD-10-CM

## 2023-10-12 LAB
BILIRUBIN, POC: ABNORMAL
BLOOD URINE, POC: ABNORMAL
CLARITY, POC: CLEAR
COLOR, POC: YELLOW
GLUCOSE URINE, POC: ABNORMAL
KETONES, POC: ABNORMAL
LEUKOCYTE EST, POC: ABNORMAL
NITRITE, POC: ABNORMAL
PH, POC: 7
POST VOID RESIDUAL (PVR): 276 ML
PROTEIN, POC: ABNORMAL
SPECIFIC GRAVITY, POC: 1.01
UROBILINOGEN, POC: 0.2

## 2023-10-12 PROCEDURE — 51798 US URINE CAPACITY MEASURE: CPT | Performed by: SPECIALIST

## 2023-10-12 PROCEDURE — 99213 OFFICE O/P EST LOW 20 MIN: CPT | Performed by: SPECIALIST

## 2023-10-12 PROCEDURE — 81002 URINALYSIS NONAUTO W/O SCOPE: CPT | Performed by: SPECIALIST

## 2023-10-12 PROCEDURE — 1123F ACP DISCUSS/DSCN MKR DOCD: CPT | Performed by: SPECIALIST

## 2023-10-12 PROCEDURE — 99214 OFFICE O/P EST MOD 30 MIN: CPT | Performed by: SPECIALIST

## 2023-10-12 NOTE — PROGRESS NOTES
Christiano Knott MD, [address] [facility] Established Patient office note    Patient:  Arabella Leigh  YOB: 1940  Date: 10/12/23    HISTORY OF PRESENT ILLNESS:   The patient is a 80 y.o. female  Patient's urgency and frequency is worse and she is not emptying bladder. Her Postvoid Residual was 276 mL. She cannot perform intermittent straight catheterization and will need an indwelling lance catheter and this will be placed at her extended care facility. They were instructed to change the indwelling lance catheter monthly.     Today's AUA Symptom Score (QOL): 6 (6)    Summary of old records:   History of AUR, incomplete bladder emptyin23 Postvoid Residual = 74 mL; 10/12/23 PVR =276 mL; recommend indwelling lance catheter-to be placed by ECF and changed monthly  Dysuria: st cath urine for C&S, empiric Rx with Macrobid  Urinary incontinence wo sensory awareness  Right renal cyst on 23 CT stone, suggestion of internal calcifications (complex); 8/10/23 CT wo contrast     Additional History: none    Procedures Today: N/A    Urinalysis today:  Results for POC orders placed in visit on 10/12/23   POCT Urinalysis no Micro   Result Value Ref Range    Color, UA yellow     Clarity, UA clear     Glucose, UA POC neg     Bilirubin, UA neg     Ketones, UA neg     Spec Grav, UA 1.015     Blood, UA POC trace-intact     pH, UA 7.0     Protein, UA POC neg     Urobilinogen, UA 0.2     Leukocytes, UA neg     Nitrite, UA trace        Last BUN and creatinine:  Lab Results   Component Value Date    BUN 12 2023     Lab Results   Component Value Date    CREATININE 0.5 2023       Last CBC:  Lab Results   Component Value Date    WBC 8.5 2023    HGB 12.6 2023    HCT 38.9 2023    MCV 96.3 2023     2023       Additional Lab/Culture results: none    Imaging Reviewed during this Office Visit:   8/10/23 CT abdomen and pelvis without contrast   Kidneys are

## 2023-10-14 PROBLEM — R35.0 FREQUENCY OF MICTURITION: Status: ACTIVE | Noted: 2023-10-14

## 2023-10-18 ENCOUNTER — HOSPITAL ENCOUNTER (OUTPATIENT)
Age: 83
Setting detail: SPECIMEN
Discharge: HOME | End: 2023-10-18
Payer: MEDICARE

## 2023-10-18 LAB
ANION GAP SERPL CALCULATED.3IONS-SCNC: 9 MMOL/L (ref 9–17)
BUN SERPL-MCNC: 13 MG/DL (ref 8–23)
BUN/CREAT SERPL: 22 (ref 9–20)
CALCIUM SERPL-MCNC: 9.8 MG/DL (ref 8.6–10.4)
CHLORIDE SERPL-SCNC: 100 MMOL/L (ref 98–107)
CO2 SERPL-SCNC: 27 MMOL/L (ref 20–31)
CREAT SERPL-MCNC: 0.6 MG/DL (ref 0.5–0.9)
ERYTHROCYTE [DISTWIDTH] IN BLOOD BY AUTOMATED COUNT: 13.7 % (ref 11.8–14.4)
GFR SERPL CREATININE-BSD FRML MDRD: >60 ML/MIN/1.73M2
GLUCOSE SERPL-MCNC: 125 MG/DL (ref 70–99)
HCT VFR BLD AUTO: 37.3 % (ref 36.3–47.1)
HGB BLD-MCNC: 12 G/DL (ref 11.9–15.1)
MCH RBC QN AUTO: 31.5 PG (ref 25.2–33.5)
MCHC RBC AUTO-ENTMCNC: 32.2 G/DL (ref 28.4–34.8)
MCV RBC AUTO: 97.9 FL (ref 82.6–102.9)
NRBC BLD-RTO: 0 PER 100 WBC
PLATELET # BLD AUTO: 335 K/UL (ref 138–453)
PMV BLD AUTO: 10 FL (ref 8.1–13.5)
POTASSIUM SERPL-SCNC: 4 MMOL/L (ref 3.7–5.3)
RBC # BLD AUTO: 3.81 M/UL (ref 3.95–5.11)
SODIUM SERPL-SCNC: 136 MMOL/L (ref 135–144)
WBC OTHER # BLD: 8.6 K/UL (ref 3.5–11.3)

## 2023-10-18 PROCEDURE — P9603 ONE-WAY ALLOW PRORATED MILES: HCPCS

## 2023-10-18 PROCEDURE — 36415 COLL VENOUS BLD VENIPUNCTURE: CPT

## 2023-10-18 PROCEDURE — 85027 COMPLETE CBC AUTOMATED: CPT

## 2023-10-18 PROCEDURE — 80048 BASIC METABOLIC PNL TOTAL CA: CPT

## 2023-10-30 ENCOUNTER — TELEPHONE (OUTPATIENT)
Dept: FAMILY MEDICINE CLINIC | Age: 83
End: 2023-10-30

## 2023-10-30 NOTE — TELEPHONE ENCOUNTER
----- Message from Bonnielee Litten sent at 10/30/2023 11:51 AM EDT -----  Subject: Message to Provider    QUESTIONS  Information for Provider? Pt is calling in states she has a referral in   place with [facility]. Pt states she called to schedule an   appointment and they do not except Dora Wilson. Pt is requesting a new   referral. Please advise.   ---------------------------------------------------------------------------  --------------  Rebecca Gaffney INFO  [phone]; Do not leave any message, patient will call back for answer  ---------------------------------------------------------------------------  --------------  SCRIPT ANSWERS  Relationship to Patient?  Self

## 2023-10-30 NOTE — TELEPHONE ENCOUNTER
Called pts son Dewayne Schwartz, [place] informing him pt should call her insurance to find out who is in network with her insurance     Spoke with pt, informed her to call her insurance to find out who is in network, pt will call back with that info for  anew referral

## 2023-11-30 ENCOUNTER — HOSPITAL ENCOUNTER (OUTPATIENT)
Age: 83
Setting detail: SPECIMEN
Discharge: HOME | End: 2023-11-30

## 2023-12-01 ENCOUNTER — HOSPITAL ENCOUNTER (OUTPATIENT)
Age: 83
Setting detail: SPECIMEN
Discharge: HOME | End: 2023-12-01

## 2023-12-01 LAB
CHOLEST SERPL-MCNC: 143 MG/DL
CHOLESTEROL/HDL RATIO: 2.2
HDLC SERPL-MCNC: 64 MG/DL
LDLC SERPL CALC-MCNC: 68 MG/DL (ref 0–130)
TRIGL SERPL-MCNC: 56 MG/DL

## 2023-12-01 PROCEDURE — 36415 COLL VENOUS BLD VENIPUNCTURE: CPT

## 2023-12-01 PROCEDURE — P9603 ONE-WAY ALLOW PRORATED MILES: HCPCS

## 2023-12-01 PROCEDURE — 80061 LIPID PANEL: CPT

## 2024-01-19 ENCOUNTER — TELEPHONE (OUTPATIENT)
Dept: FAMILY MEDICINE CLINIC | Age: 84
End: 2024-01-19

## 2024-01-19 DIAGNOSIS — H26.9 CATARACT OF LEFT EYE, UNSPECIFIED CATARACT TYPE: Primary | ICD-10-CM

## 2024-01-19 NOTE — TELEPHONE ENCOUNTER
Patient contacted office asking for referral to Dr. Roche at [facility]    PH: [phone].    Attempted to get fax number from office, office was closed

## 2024-03-07 ENCOUNTER — HOSPITAL ENCOUNTER (OUTPATIENT)
Dept: PREADMISSION TESTING | Age: 84
Discharge: HOME | End: 2024-03-11

## 2024-03-07 VITALS — HEIGHT: 64 IN | BODY MASS INDEX: 17.07 KG/M2 | WEIGHT: 100 LBS

## 2024-03-07 RX ORDER — ALPRAZOLAM 0.25 MG/1
0.25 TABLET ORAL NIGHTLY PRN
COMMUNITY

## 2024-03-07 NOTE — PROGRESS NOTES
will be prepared for surgery.  A physical assessment will be performed by a nurse practitioner or house officer.  Your IV will be started and you will meet your anesthesiologist.    When you go to surgery, your family will be directed to the surgical waiting room, where the doctor should speak with them after your surgery.    After surgery, you will be taken to the recovery area.  When you are alert and stable, you will receive instructions and be prepared for discharge.     If you use a Bi-PAP or C-PAP machine, please bring it with you and leave it in the car in case it is needed in recovery room.    INSTRUCTIONS READ TO RAJAN, UNDERSTANDING VERBALIZED AND NO QUESTIONS AT THIS TIME.    LEFT EYE CATARACT EMULSIFICATION IOL IMPLANT- 03/14/2024      p

## 2024-03-13 ENCOUNTER — ANESTHESIA EVENT (OUTPATIENT)
Dept: OPERATING ROOM | Age: 84
End: 2024-03-13
Payer: COMMERCIAL

## 2024-03-13 NOTE — PRE-PROCEDURE INSTRUCTIONS
No answer, left message ?       yes                      Unable to leave message ?    When were you told to arrive at hospital ?  0700/0900    Do you have a  ?    Are you on any blood thinners ?                     If yes when did you stop taking ?    Do you have your prep Rx filled and instruction ?      Nothing to eat the day before , only clear liquids.    Are you experiencing any covid symptoms ?     Do you have any infections or rash we should be aware of ?      Do you have the Hibiclens soap to use the night before and the morning of surgery ?    Nothing to eat or drink after midnight, only a sip of water to take any medication instructed to take the night before.  Wear comfortable clothing, leave any valuables at home, remove any jewelry and body piercing .

## 2024-03-13 NOTE — DISCHARGE INSTRUCTIONS
1.  Remove the shield at 1:00 p.m. today and begin all the eye drops.    2.  Repeat the eye drops at 5:00 p.m. and before bedtime one drop per bottle, any order.  Wait 3-4 minutes between drops.  The drops are:    ANTIBIOTIC:   [x]   Vigamox   []  Tobramycin   []  Zymaxid     []  Gatifloxacin     STEROID:     [x]   Prednisolone Acetate        []   Durezol    ANTI-INFLAMMATORY     []    Nevenac    [x]   Ketorolac    []  Ilevro    []  Prolensa    []         3. Place one drop from each bottle by looking up, pull the lower lid down gently and let the drop fall in.    4. You may wipe the eyelid gently with a clean tissue or cotton.    5. Place only the shield over the eye when sleeping for (4) four days:  Fix with tape    6. In the morning remove the shield and start putting in the drops, one drop from each bottle.  Replace the shield or wear glasses during the day.    7. Please call Dr Roche if you have any problems:    Office [phone]    8. Continue all your previous medications.  You may use Aspirin, Tylenol, or Advil if needed.    9.  You have an appointment in the office:    10.  Please bring your drops into the office at your next visit.      Follow drop schedule and remember appointment tomorrow

## 2024-03-14 ENCOUNTER — ANESTHESIA (OUTPATIENT)
Dept: OPERATING ROOM | Age: 84
End: 2024-03-14
Payer: COMMERCIAL

## 2024-03-14 ENCOUNTER — HOSPITAL ENCOUNTER (OUTPATIENT)
Age: 84
Setting detail: OUTPATIENT SURGERY
Discharge: HOME | End: 2024-03-14
Attending: STUDENT IN AN ORGANIZED HEALTH CARE EDUCATION/TRAINING PROGRAM | Admitting: STUDENT IN AN ORGANIZED HEALTH CARE EDUCATION/TRAINING PROGRAM
Payer: COMMERCIAL

## 2024-03-14 VITALS
HEART RATE: 110 BPM | DIASTOLIC BLOOD PRESSURE: 48 MMHG | HEIGHT: 64 IN | SYSTOLIC BLOOD PRESSURE: 125 MMHG | OXYGEN SATURATION: 97 % | WEIGHT: 125 LBS | TEMPERATURE: 97 F | BODY MASS INDEX: 21.34 KG/M2 | RESPIRATION RATE: 22 BRPM

## 2024-03-14 PROCEDURE — 6360000002 HC RX W HCPCS: Performed by: STUDENT IN AN ORGANIZED HEALTH CARE EDUCATION/TRAINING PROGRAM

## 2024-03-14 PROCEDURE — 7100000010 HC PHASE II RECOVERY - FIRST 15 MIN: Performed by: STUDENT IN AN ORGANIZED HEALTH CARE EDUCATION/TRAINING PROGRAM

## 2024-03-14 PROCEDURE — V2632 POST CHMBR INTRAOCULAR LENS: HCPCS | Performed by: STUDENT IN AN ORGANIZED HEALTH CARE EDUCATION/TRAINING PROGRAM

## 2024-03-14 PROCEDURE — 7100000000 HC PACU RECOVERY - FIRST 15 MIN: Performed by: STUDENT IN AN ORGANIZED HEALTH CARE EDUCATION/TRAINING PROGRAM

## 2024-03-14 PROCEDURE — 3600000012 HC SURGERY LEVEL 2 ADDTL 15MIN: Performed by: STUDENT IN AN ORGANIZED HEALTH CARE EDUCATION/TRAINING PROGRAM

## 2024-03-14 PROCEDURE — 2580000003 HC RX 258: Performed by: ANESTHESIOLOGY

## 2024-03-14 PROCEDURE — 2500000003 HC RX 250 WO HCPCS: Performed by: NURSE ANESTHETIST, CERTIFIED REGISTERED

## 2024-03-14 PROCEDURE — 7100000030 HC ASPR PHASE II RECOVERY - FIRST 15 MIN: Performed by: STUDENT IN AN ORGANIZED HEALTH CARE EDUCATION/TRAINING PROGRAM

## 2024-03-14 PROCEDURE — 6370000000 HC RX 637 (ALT 250 FOR IP): Performed by: STUDENT IN AN ORGANIZED HEALTH CARE EDUCATION/TRAINING PROGRAM

## 2024-03-14 PROCEDURE — 2500000003 HC RX 250 WO HCPCS: Performed by: ANESTHESIOLOGY

## 2024-03-14 PROCEDURE — 3700000000 HC ANESTHESIA ATTENDED CARE: Performed by: STUDENT IN AN ORGANIZED HEALTH CARE EDUCATION/TRAINING PROGRAM

## 2024-03-14 PROCEDURE — 7100000031 HC ASPR PHASE II RECOVERY - ADDTL 15 MIN: Performed by: STUDENT IN AN ORGANIZED HEALTH CARE EDUCATION/TRAINING PROGRAM

## 2024-03-14 PROCEDURE — 7100000011 HC PHASE II RECOVERY - ADDTL 15 MIN: Performed by: STUDENT IN AN ORGANIZED HEALTH CARE EDUCATION/TRAINING PROGRAM

## 2024-03-14 PROCEDURE — 3600000002 HC SURGERY LEVEL 2 BASE: Performed by: STUDENT IN AN ORGANIZED HEALTH CARE EDUCATION/TRAINING PROGRAM

## 2024-03-14 PROCEDURE — 7100000001 HC PACU RECOVERY - ADDTL 15 MIN: Performed by: STUDENT IN AN ORGANIZED HEALTH CARE EDUCATION/TRAINING PROGRAM

## 2024-03-14 PROCEDURE — 2500000003 HC RX 250 WO HCPCS: Performed by: STUDENT IN AN ORGANIZED HEALTH CARE EDUCATION/TRAINING PROGRAM

## 2024-03-14 PROCEDURE — 2709999900 HC NON-CHARGEABLE SUPPLY: Performed by: STUDENT IN AN ORGANIZED HEALTH CARE EDUCATION/TRAINING PROGRAM

## 2024-03-14 PROCEDURE — 3700000001 HC ADD 15 MINUTES (ANESTHESIA): Performed by: STUDENT IN AN ORGANIZED HEALTH CARE EDUCATION/TRAINING PROGRAM

## 2024-03-14 PROCEDURE — 6360000002 HC RX W HCPCS: Performed by: NURSE ANESTHETIST, CERTIFIED REGISTERED

## 2024-03-14 DEVICE — STERILE UV AND BLUE LIGHT FILTERING ACRYLIC FOLDABLE ASPHERIC POSTERIOR CHAMBER INTRAOCULAR LENS
Type: IMPLANTABLE DEVICE | Site: EYE | Status: FUNCTIONAL
Brand: CLAREON

## 2024-03-14 RX ORDER — BALANCED SALT SOLUTION ENRICHED WITH BICARBONATE, DEXTROSE, AND GLUTATHIONE
KIT INTRAOCULAR PRN
Status: DISCONTINUED | OUTPATIENT
Start: 2024-03-14 | End: 2024-03-14 | Stop reason: ALTCHOICE

## 2024-03-14 RX ORDER — POTASSIUM CHLORIDE 750 MG/1
10 TABLET, FILM COATED, EXTENDED RELEASE ORAL DAILY
COMMUNITY
Start: 2024-01-08

## 2024-03-14 RX ORDER — FENTANYL CITRATE 0.05 MG/ML
25 INJECTION, SOLUTION INTRAMUSCULAR; INTRAVENOUS EVERY 5 MIN PRN
Status: DISCONTINUED | OUTPATIENT
Start: 2024-03-14 | End: 2024-03-14 | Stop reason: HOSPADM

## 2024-03-14 RX ORDER — PHENYLEPHRINE HYDROCHLORIDE 10 MG/ML
INJECTION INTRAVENOUS PRN
Status: DISCONTINUED | OUTPATIENT
Start: 2024-03-14 | End: 2024-03-14 | Stop reason: SDUPTHER

## 2024-03-14 RX ORDER — SODIUM CHLORIDE, SODIUM LACTATE, POTASSIUM CHLORIDE, CALCIUM CHLORIDE 600; 310; 30; 20 MG/100ML; MG/100ML; MG/100ML; MG/100ML
INJECTION, SOLUTION INTRAVENOUS CONTINUOUS
Status: DISCONTINUED | OUTPATIENT
Start: 2024-03-14 | End: 2024-03-14 | Stop reason: HOSPADM

## 2024-03-14 RX ORDER — PHENYLEPHRINE HYDROCHLORIDE 25 MG/ML
1 SOLUTION/ DROPS OPHTHALMIC SEE ADMIN INSTRUCTIONS
Status: COMPLETED | OUTPATIENT
Start: 2024-03-14 | End: 2024-03-14

## 2024-03-14 RX ORDER — PROPOFOL 10 MG/ML
INJECTION, EMULSION INTRAVENOUS PRN
Status: DISCONTINUED | OUTPATIENT
Start: 2024-03-14 | End: 2024-03-14 | Stop reason: SDUPTHER

## 2024-03-14 RX ORDER — LIDOCAINE HYDROCHLORIDE 10 MG/ML
INJECTION, SOLUTION EPIDURAL; INFILTRATION; INTRACAUDAL; PERINEURAL PRN
Status: DISCONTINUED | OUTPATIENT
Start: 2024-03-14 | End: 2024-03-14 | Stop reason: SDUPTHER

## 2024-03-14 RX ORDER — SODIUM CHLORIDE 0.9 % (FLUSH) 0.9 %
5-40 SYRINGE (ML) INJECTION EVERY 12 HOURS SCHEDULED
Status: DISCONTINUED | OUTPATIENT
Start: 2024-03-14 | End: 2024-03-14 | Stop reason: HOSPADM

## 2024-03-14 RX ORDER — SODIUM CHLORIDE 0.9 % (FLUSH) 0.9 %
5-40 SYRINGE (ML) INJECTION PRN
Status: DISCONTINUED | OUTPATIENT
Start: 2024-03-14 | End: 2024-03-14 | Stop reason: HOSPADM

## 2024-03-14 RX ORDER — SODIUM CHLORIDE 9 MG/ML
INJECTION, SOLUTION INTRAVENOUS PRN
Status: DISCONTINUED | OUTPATIENT
Start: 2024-03-14 | End: 2024-03-14 | Stop reason: HOSPADM

## 2024-03-14 RX ORDER — BUPIVACAINE HYDROCHLORIDE 5 MG/ML
0.2 INJECTION, SOLUTION EPIDURAL; INTRACAUDAL EVERY 5 MIN PRN
Status: COMPLETED | OUTPATIENT
Start: 2024-03-14 | End: 2024-03-14

## 2024-03-14 RX ORDER — KETOROLAC TROMETHAMINE 5 MG/ML
1 SOLUTION OPHTHALMIC SEE ADMIN INSTRUCTIONS
Status: COMPLETED | OUTPATIENT
Start: 2024-03-14 | End: 2024-03-14

## 2024-03-14 RX ORDER — ROCURONIUM BROMIDE 10 MG/ML
INJECTION, SOLUTION INTRAVENOUS PRN
Status: DISCONTINUED | OUTPATIENT
Start: 2024-03-14 | End: 2024-03-14 | Stop reason: SDUPTHER

## 2024-03-14 RX ORDER — TROPICAMIDE 10 MG/ML
1 SOLUTION/ DROPS OPHTHALMIC SEE ADMIN INSTRUCTIONS
Status: COMPLETED | OUTPATIENT
Start: 2024-03-14 | End: 2024-03-14

## 2024-03-14 RX ORDER — TETRACAINE HYDROCHLORIDE 5 MG/ML
1 SOLUTION OPHTHALMIC SEE ADMIN INSTRUCTIONS
Status: DISCONTINUED | OUTPATIENT
Start: 2024-03-14 | End: 2024-03-14 | Stop reason: HOSPADM

## 2024-03-14 RX ORDER — DIPHENHYDRAMINE HYDROCHLORIDE 50 MG/ML
12.5 INJECTION INTRAMUSCULAR; INTRAVENOUS
Status: DISCONTINUED | OUTPATIENT
Start: 2024-03-14 | End: 2024-03-14 | Stop reason: HOSPADM

## 2024-03-14 RX ORDER — LIDOCAINE HYDROCHLORIDE 10 MG/ML
1 INJECTION, SOLUTION EPIDURAL; INFILTRATION; INTRACAUDAL; PERINEURAL
Status: COMPLETED | OUTPATIENT
Start: 2024-03-14 | End: 2024-03-14

## 2024-03-14 RX ORDER — ONDANSETRON 2 MG/ML
INJECTION INTRAMUSCULAR; INTRAVENOUS PRN
Status: DISCONTINUED | OUTPATIENT
Start: 2024-03-14 | End: 2024-03-14 | Stop reason: SDUPTHER

## 2024-03-14 RX ORDER — ONDANSETRON 2 MG/ML
4 INJECTION INTRAMUSCULAR; INTRAVENOUS
Status: DISCONTINUED | OUTPATIENT
Start: 2024-03-14 | End: 2024-03-14 | Stop reason: HOSPADM

## 2024-03-14 RX ORDER — DEXAMETHASONE SODIUM PHOSPHATE 4 MG/ML
INJECTION, SOLUTION INTRA-ARTICULAR; INTRALESIONAL; INTRAMUSCULAR; INTRAVENOUS; SOFT TISSUE PRN
Status: DISCONTINUED | OUTPATIENT
Start: 2024-03-14 | End: 2024-03-14 | Stop reason: SDUPTHER

## 2024-03-14 RX ORDER — CIPROFLOXACIN HYDROCHLORIDE 3.5 MG/ML
1 SOLUTION/ DROPS TOPICAL SEE ADMIN INSTRUCTIONS
Status: COMPLETED | OUTPATIENT
Start: 2024-03-14 | End: 2024-03-14

## 2024-03-14 RX ORDER — FENTANYL CITRATE 0.05 MG/ML
50 INJECTION, SOLUTION INTRAMUSCULAR; INTRAVENOUS EVERY 5 MIN PRN
Status: DISCONTINUED | OUTPATIENT
Start: 2024-03-14 | End: 2024-03-14 | Stop reason: HOSPADM

## 2024-03-14 RX ORDER — NEOMYCIN SULFATE, POLYMYXIN B SULFATE, AND DEXAMETHASONE 3.5; 10000; 1 MG/G; [USP'U]/G; MG/G
OINTMENT OPHTHALMIC PRN
Status: DISCONTINUED | OUTPATIENT
Start: 2024-03-14 | End: 2024-03-14 | Stop reason: ALTCHOICE

## 2024-03-14 RX ORDER — CETIRIZINE HYDROCHLORIDE 5 MG/1
5 TABLET ORAL DAILY
COMMUNITY

## 2024-03-14 RX ORDER — CIPROFLOXACIN HYDROCHLORIDE 3.5 MG/ML
SOLUTION/ DROPS TOPICAL PRN
Status: DISCONTINUED | OUTPATIENT
Start: 2024-03-14 | End: 2024-03-14 | Stop reason: ALTCHOICE

## 2024-03-14 RX ADMIN — SODIUM CHLORIDE, POTASSIUM CHLORIDE, SODIUM LACTATE AND CALCIUM CHLORIDE: 600; 310; 30; 20 INJECTION, SOLUTION INTRAVENOUS at 07:58

## 2024-03-14 RX ADMIN — TROPICAMIDE 1 DROP: 10 SOLUTION/ DROPS OPHTHALMIC at 07:58

## 2024-03-14 RX ADMIN — PHENYLEPHRINE HYDROCHLORIDE 100 MCG: 10 INJECTION INTRAVENOUS at 10:06

## 2024-03-14 RX ADMIN — KETOROLAC TROMETHAMINE 1 DROP: 5 SOLUTION/ DROPS OPHTHALMIC at 08:06

## 2024-03-14 RX ADMIN — ONDANSETRON 4 MG: 2 INJECTION INTRAMUSCULAR; INTRAVENOUS at 09:32

## 2024-03-14 RX ADMIN — LIDOCAINE HYDROCHLORIDE 20 MG: 10 INJECTION, SOLUTION EPIDURAL; INFILTRATION; INTRACAUDAL; PERINEURAL at 09:12

## 2024-03-14 RX ADMIN — DEXAMETHASONE SODIUM PHOSPHATE 4 MG: 4 INJECTION INTRA-ARTICULAR; INTRALESIONAL; INTRAMUSCULAR; INTRAVENOUS; SOFT TISSUE at 09:32

## 2024-03-14 RX ADMIN — CIPROFLOXACIN 1 DROP: 3 SOLUTION OPHTHALMIC at 07:49

## 2024-03-14 RX ADMIN — PHENYLEPHRINE HYDROCHLORIDE 1 DROP: 25 SOLUTION/ DROPS OPHTHALMIC at 07:49

## 2024-03-14 RX ADMIN — CIPROFLOXACIN 1 DROP: 3 SOLUTION OPHTHALMIC at 08:06

## 2024-03-14 RX ADMIN — TROPICAMIDE 1 DROP: 10 SOLUTION/ DROPS OPHTHALMIC at 08:06

## 2024-03-14 RX ADMIN — PROPOFOL 100 MG: 10 INJECTION, EMULSION INTRAVENOUS at 09:12

## 2024-03-14 RX ADMIN — SUGAMMADEX 100 MG: 100 INJECTION, SOLUTION INTRAVENOUS at 10:17

## 2024-03-14 RX ADMIN — CIPROFLOXACIN 1 DROP: 3 SOLUTION OPHTHALMIC at 07:58

## 2024-03-14 RX ADMIN — LIDOCAINE HYDROCHLORIDE 1 ML: 10 INJECTION, SOLUTION EPIDURAL; INFILTRATION; INTRACAUDAL; PERINEURAL at 07:57

## 2024-03-14 RX ADMIN — KETOROLAC TROMETHAMINE 1 DROP: 5 SOLUTION/ DROPS OPHTHALMIC at 07:49

## 2024-03-14 RX ADMIN — PHENYLEPHRINE HYDROCHLORIDE 1 DROP: 25 SOLUTION/ DROPS OPHTHALMIC at 08:06

## 2024-03-14 RX ADMIN — PHENYLEPHRINE HYDROCHLORIDE 100 MCG: 10 INJECTION INTRAVENOUS at 09:57

## 2024-03-14 RX ADMIN — KETOROLAC TROMETHAMINE 1 DROP: 5 SOLUTION/ DROPS OPHTHALMIC at 07:59

## 2024-03-14 RX ADMIN — PROPOFOL 30 MG: 10 INJECTION, EMULSION INTRAVENOUS at 10:17

## 2024-03-14 RX ADMIN — PHENYLEPHRINE HYDROCHLORIDE 1 DROP: 25 SOLUTION/ DROPS OPHTHALMIC at 07:59

## 2024-03-14 RX ADMIN — Medication 1 MG: at 07:59

## 2024-03-14 RX ADMIN — ROCURONIUM BROMIDE 15 MG: 10 INJECTION, SOLUTION INTRAVENOUS at 09:19

## 2024-03-14 RX ADMIN — PHENYLEPHRINE HYDROCHLORIDE 100 MCG: 10 INJECTION INTRAVENOUS at 09:54

## 2024-03-14 RX ADMIN — Medication 1 MG: at 07:52

## 2024-03-14 RX ADMIN — TETRACAINE HYDROCHLORIDE 1 DROP: 5 SOLUTION OPHTHALMIC at 07:48

## 2024-03-14 RX ADMIN — TROPICAMIDE 1 DROP: 10 SOLUTION/ DROPS OPHTHALMIC at 07:49

## 2024-03-14 ASSESSMENT — PAIN - FUNCTIONAL ASSESSMENT
PAIN_FUNCTIONAL_ASSESSMENT: 0-10
PAIN_FUNCTIONAL_ASSESSMENT: 0-10
PAIN_FUNCTIONAL_ASSESSMENT: NONE - DENIES PAIN

## 2024-03-14 ASSESSMENT — PAIN DESCRIPTION - DESCRIPTORS: DESCRIPTORS: ACHING

## 2024-03-14 NOTE — PROGRESS NOTES
[id number] report called to Tete Tovar nurse carrying for patient. Reviewed Dr Roche discharge instructions.  Instructed to Call Dr Roche for any further questions, number in printed instruction sheet.

## 2024-03-14 NOTE — ANESTHESIA PRE PROCEDURE
results found for: \"PREGTESTUR\", \"PREGSERUM\", \"HCG\", \"HCGQUANT\"     ABGs: No results found for: \"PHART\", \"PO2ART\", \"SCB3XOP\", \"PUC6LOI\", \"BEART\", \"T4WLUXSY\"     Type & Screen (If Applicable):  No results found for: \"LABABO\", \"LABRH\"    Drug/Infectious Status (If Applicable):  No results found for: \"HIV\", \"HEPCAB\"    COVID-19 Screening (If Applicable):   Lab Results   Component Value Date/Time    COVID19 Not Detected 07/02/2023 10:20 AM    COVID19 Not Detected 03/02/2023 10:37 PM           Anesthesia Evaluation  Patient summary reviewed and Nursing notes reviewed   no history of anesthetic complications:   Airway: Mallampati: II  TM distance: >3 FB   Neck ROM: full  Mouth opening: > = 3 FB   Dental: normal exam         Pulmonary:normal exam  breath sounds clear to auscultation  (+)     sleep apnea:                                  Cardiovascular:    (+) hyperlipidemia        Rhythm: regular  Rate: normal           Beta Blocker:  Dose within 24 Hrs         Neuro/Psych:   (+) neuromuscular disease:, psychiatric history:depression/anxiety              ROS comment: Ataxia  Involuntary Tremors - pt denies Parkinson's  GI/Hepatic/Renal:   (+) GERD:, liver disease:, renal disease:          Endo/Other:                     Abdominal:             Vascular: negative vascular ROS.         Other Findings:         Anesthesia Plan      general     ASA 3     (LMA)  Induction: intravenous.    MIPS: Prophylactic antiemetics administered.  Anesthetic plan and risks discussed with patient.      Plan discussed with CRNA.                  Latoya García MD   3/14/2024

## 2024-03-14 NOTE — OP NOTE
Ana Luisa Herzog [id number] 83 y.o.     OPERATIVE NOTE     Preoperative Diagnosis: Cataract the left eye     Postoperative Diagnosis: Cataract the left eye      Procedure: Phacoemulsification with intraocular lens implantation, the left eye     Surgeon: Dougie Roche MD     Anesthesia: General anesthesia     Complications: none     Specimens: none     EBL: none     Indications for procedure:  The patient is a 83 y.o. year old with decreased vision, glare and halos around lights, and trouble with activities of daily living.  Examination revealed a visually significant cataract in the the left eye.  Risks, benefits, and alternatives to surgery were discussed with the patient and the patient elected to proceed with phacoemulsification with lens implantation.        Details of the procedure:  Following informed consent, the patient was taken to the operating room and placed in the supine position.  The eye was prepped and draped in the usual sterile fashion using aseptic technique for cataract surgery and a lid speculum was placed between the lids.  Several drops of topical .5% Marcaine were place on the eye. A side port incision was made in the inferior position.  A small amount of preservative-free epishugarcaine was placed in the eye.  The eye was filled with viscoelastic. A full thickness 3 step incision was created using a keratome blade. A continuous tear capsulorhexis was performed.  The lens was hydrodissected and hydrodilineated with balanced salt solution.   Phacoemulsification was performed in a divide and conquer technique.  Irrigation/aspiration was used to remove all cortical material from the capsular bag.  The eye was filled with viscoelastic and a foldable posterior chamber intraocular lens was injected into the capsular bag and rotated into position model SY60WF 16.0 diopter power.  Irrigation/aspiration was used to remove all excess viscoelastic.  The eye was pressurized and the wounds were checked for

## 2024-03-14 NOTE — H&P
Ana Luisa Herzog is a 83 y.o. year old who presents for elective outpatient ophthalmic surgery with Dougie Roche MD.  The patient complains of decreased vision, glare and halos around lights, and trouble with vision for activities of daily living.      Past Medical History:   Diagnosis Date    OLIVA (acute kidney injury) (HCC) 07/08/2023    Anxiety and depression     Depression     GERD (gastroesophageal reflux disease)     Sleep apnea     Weight loss        Past Surgical History:   Procedure Laterality Date    BREAST BIOPSY Right 2010    COCCYX REMOVAL  09/01/1950    COLONOSCOPY  01/01/2011    COLONOSCOPY  04/25/2014    ENDOSCOPY, COLON, DIAGNOSTIC      EYE SURGERY      injury to eye    SPINE SURGERY      tailbone removed as teenager    TONSILLECTOMY      UPPER GASTROINTESTINAL ENDOSCOPY  04/25/2014         Current Facility-Administered Medications:     sodium chloride flush 0.9 % injection 5-40 mL, 5-40 mL, IntraVENous, 2 times per day, Dougie Roche MD    sodium chloride flush 0.9 % injection 5-40 mL, 5-40 mL, IntraVENous, PRN, Dougie Roche MD    0.9 % sodium chloride infusion, , IntraVENous, PRN, Dougie Roche MD    tetracaine (TETRAVISC) 0.5 % ophthalmic solution 1 drop, 1 drop, Left Eye, See Admin Instructions, Dougie Roche MD, 1 drop at 03/14/24 0748    sodium chloride flush 0.9 % injection 5-40 mL, 5-40 mL, IntraVENous, 2 times per day, Guille Ibarra MD    sodium chloride flush 0.9 % injection 5-40 mL, 5-40 mL, IntraVENous, PRN, Guille Ibarra MD    0.9 % sodium chloride infusion, , IntraVENous, PRN, Guille Ibarra MD    lactated ringers IV soln infusion, , IntraVENous, Continuous, Guille Ibarra MD, Last Rate: 125 mL/hr at 03/14/24 0811, NoRateChange at 03/14/24 0811      No Known Allergies      PHYSICAL EXAMINATION    Vitals:    03/14/24 0728   BP: (!) 139/52   Pulse: 85   Resp: 20   Temp: 97.5 °F (36.4 °C)   SpO2: 99%       Gen: NAD  HEENT: Visually significant cataract   Pulm: CTA

## 2024-03-14 NOTE — ANESTHESIA POSTPROCEDURE EVALUATION
Department of Anesthesiology  Postprocedure Note    Patient: Ana Luisa Herzog  MRN: [id number]  YOB: 1940  Date of evaluation: 3/14/2024    Procedure Summary       Date: 03/14/24 Room / Location: [address]    Anesthesia Start: 0908 Anesthesia Stop: 1041    Procedure: EYE CATARACT EMULSIFICATION INTRAOCULAR LENS IMPLANT (Left: Eye) Diagnosis:       Nuclear sclerotic cataract of left eye      (Nuclear sclerotic cataract of left eye [H25.12])    Surgeons: Dougie Roche MD Responsible Provider: Latoya García MD    Anesthesia Type: general ASA Status: 3            Anesthesia Type: No value filed.    Mino Phase I: Mino Score: 10    Mino Phase II: Mino Score: 10    Anesthesia Post Evaluation    Comments: POST- ANESTHESIA EVALUATION       Pt Name: Ana Luisa Herzog  MRN: [id number]  YOB: 1940  Date of evaluation: 3/14/2024  Time:  11:25 AM      BP (!) 125/48   Pulse (!) 110 Comment: anxious! nervous  Temp 97 °F (36.1 °C) (Infrared)   Resp 22   Ht 1.626 m (5' 4\")   Wt 56.7 kg (125 lb)   SpO2 97%   BMI 21.46 kg/m²      Consciousness Level  Awake  Cardiopulmonary Status  Stable  Pain Adequately Treated YES  Nausea / Vomiting  NO  Adequate Hydration  YES  Anesthesia Related Complications NONE      Electronically signed by Latoya García MD on 3/14/2024 at 11:25 AM           No notable events documented.

## 2024-04-16 ENCOUNTER — HOSPITAL ENCOUNTER (OUTPATIENT)
Age: 84
Setting detail: SPECIMEN
Discharge: HOME | End: 2024-04-16
Payer: COMMERCIAL

## 2024-04-16 LAB
ANION GAP SERPL CALCULATED.3IONS-SCNC: 9 MMOL/L (ref 9–16)
BUN SERPL-MCNC: 11 MG/DL (ref 8–23)
CALCIUM SERPL-MCNC: 9.9 MG/DL (ref 8.6–10.4)
CHLORIDE SERPL-SCNC: 106 MMOL/L (ref 98–107)
CHOLEST SERPL-MCNC: 142 MG/DL (ref 0–199)
CHOLESTEROL/HDL RATIO: 3
CO2 SERPL-SCNC: 28 MMOL/L (ref 20–31)
CREAT SERPL-MCNC: 0.6 MG/DL (ref 0.5–0.9)
ERYTHROCYTE [DISTWIDTH] IN BLOOD BY AUTOMATED COUNT: 13.7 % (ref 11.8–14.4)
GFR SERPL CREATININE-BSD FRML MDRD: 88 ML/MIN/1.73M2
GLUCOSE SERPL-MCNC: 95 MG/DL (ref 74–99)
HCT VFR BLD AUTO: 39.5 % (ref 36.3–47.1)
HDLC SERPL-MCNC: 44 MG/DL
HGB BLD-MCNC: 12.7 G/DL (ref 11.9–15.1)
LDLC SERPL CALC-MCNC: 80 MG/DL (ref 0–100)
MCH RBC QN AUTO: 30.8 PG (ref 25.2–33.5)
MCHC RBC AUTO-ENTMCNC: 32.2 G/DL (ref 28.4–34.8)
MCV RBC AUTO: 95.6 FL (ref 82.6–102.9)
NRBC BLD-RTO: 0 PER 100 WBC
PLATELET # BLD AUTO: 303 K/UL (ref 138–453)
PMV BLD AUTO: 9.6 FL (ref 8.1–13.5)
POTASSIUM SERPL-SCNC: 4.5 MMOL/L (ref 3.7–5.3)
RBC # BLD AUTO: 4.13 M/UL (ref 3.95–5.11)
SODIUM SERPL-SCNC: 143 MMOL/L (ref 136–145)
TRIGL SERPL-MCNC: 91 MG/DL
VLDLC SERPL CALC-MCNC: 18 MG/DL
WBC OTHER # BLD: 7.1 K/UL (ref 3.5–11.3)

## 2024-04-16 PROCEDURE — P9603 ONE-WAY ALLOW PRORATED MILES: HCPCS

## 2024-04-16 PROCEDURE — 80048 BASIC METABOLIC PNL TOTAL CA: CPT

## 2024-04-16 PROCEDURE — 36415 COLL VENOUS BLD VENIPUNCTURE: CPT

## 2024-04-16 PROCEDURE — 80061 LIPID PANEL: CPT

## 2024-04-16 PROCEDURE — 85027 COMPLETE CBC AUTOMATED: CPT

## 2024-05-13 ENCOUNTER — OFFICE VISIT (OUTPATIENT)
Dept: ORTHOPEDIC SURGERY | Age: 84
End: 2024-05-13
Payer: MEDICARE

## 2024-05-13 VITALS — WEIGHT: 125 LBS | BODY MASS INDEX: 21.34 KG/M2 | OXYGEN SATURATION: 99 % | RESPIRATION RATE: 17 BRPM | HEIGHT: 64 IN

## 2024-05-13 DIAGNOSIS — M79.672 LEFT FOOT PAIN: Primary | ICD-10-CM

## 2024-05-13 DIAGNOSIS — M20.40 HAMMER TOE, ACQUIRED: ICD-10-CM

## 2024-05-13 DIAGNOSIS — M20.22 HALLUX RIGIDUS OF LEFT FOOT: Primary | ICD-10-CM

## 2024-05-13 DIAGNOSIS — M20.12 HALLUX VALGUS OF LEFT FOOT: ICD-10-CM

## 2024-05-13 PROCEDURE — 1123F ACP DISCUSS/DSCN MKR DOCD: CPT | Performed by: ORTHOPAEDIC SURGERY

## 2024-05-13 PROCEDURE — 99212 OFFICE O/P EST SF 10 MIN: CPT | Performed by: ORTHOPAEDIC SURGERY

## 2024-05-13 NOTE — PROGRESS NOTES
[facility]  [address] [place] [address]  Dept: [phone]    Ambulatory Orthopedic Consult      CHIEF COMPLAINT:    Chief Complaint   Patient presents with    Foot Pain     Left        HISTORY OF PRESENT ILLNESS:      The patient is a 83 y.o. female who is being seen for evaluation of the above, which began in 2020 atraumatically  . At today's visit, she is using no brace/assistive device.     History is obtained today from:   [x]  the patient     [x]  EMR     []  one family member/friend    []  multiple family members/friends    []  other:      At today's visit, she localizes her pain to her left greater than right bilateral forefoot.    INTERVAL HISTORY 3/30/2023:  She is seen again today in the office for follow up of a previous issue (as above). Since being seen last, the patient is doing about the same overall. At today's visit, she is not using a brace or assistive device.    History is obtained today from:   [x]  the patient     []  EMR     []  one family member/friend    []  multiple family members/friends    []  other:      Notably, a member of the office staff is present for the entire patient interaction, Yennifer Aragon RN.  Notably, the patient is extremely anxious throughout the entirety of the visit, it does seem as though she does potentially in the middle of a panic attack.    INTERVAL HISTORY 5/13/2024:  She is seen again today in the office for follow up of a previous issue (as above). Since being seen last, the patient is doing worse. At today's visit, she is not using a brace or assistive device.    History is obtained today from:   [x]  the patient     []  EMR     [x]  one family member/friend    []  multiple family members/friends    []  other:      At today's visit, she localizes her pain to the left second toe greater than bunionette.  The patient is here today with her son.      REVIEW OF

## 2024-05-28 ENCOUNTER — TELEPHONE (OUTPATIENT)
Dept: FAMILY MEDICINE CLINIC | Age: 84
End: 2024-05-28

## 2024-05-28 NOTE — TELEPHONE ENCOUNTER
----- Message from Carolin Perez sent at 5/28/2024 12:00 PM EDT -----  Regarding: ECC Message to Provider  ECC Message to Provider    Relationship to Patient: Self     Additional Information as per the patient she wanted to know that she has a new address that stated in her record a nursing home and she is Currently seeing another doctor a specialist jose Trejo and her insurance is [employer] .  --------------------------------------------------------------------------------------------------------------------------    Call Back Information: Do not leave any message, patient will call back for answer  Preferred Call Back Number: Phone [phone]

## 2024-06-07 ENCOUNTER — HOSPITAL ENCOUNTER (OUTPATIENT)
Age: 84
Setting detail: SPECIMEN
Discharge: HOME | End: 2024-06-07

## 2024-06-07 LAB — PHENOBARBITAL: <2.4 UG/ML (ref 10–30)

## 2024-06-07 PROCEDURE — 36415 COLL VENOUS BLD VENIPUNCTURE: CPT

## 2024-06-07 PROCEDURE — 80184 ASSAY OF PHENOBARBITAL: CPT

## 2024-07-10 ENCOUNTER — HOSPITAL ENCOUNTER (OUTPATIENT)
Age: 84
Setting detail: SPECIMEN
Discharge: HOME | End: 2024-07-10
Payer: MEDICARE

## 2024-07-10 LAB
BILIRUB UR QL STRIP: NEGATIVE
CLARITY UR: CLEAR
COLOR UR: YELLOW
EPI CELLS #/AREA URNS HPF: NORMAL /HPF (ref 0–5)
GLUCOSE UR STRIP-MCNC: NEGATIVE MG/DL
HGB UR QL STRIP.AUTO: ABNORMAL
KETONES UR STRIP-MCNC: NEGATIVE MG/DL
LEUKOCYTE ESTERASE UR QL STRIP: NEGATIVE
NITRITE UR QL STRIP: NEGATIVE
PH UR STRIP: 7 [PH] (ref 5–8)
PROT UR STRIP-MCNC: NEGATIVE MG/DL
RBC #/AREA URNS HPF: NORMAL /HPF (ref 0–2)
SP GR UR STRIP: 1.01 (ref 1–1.03)
UROBILINOGEN UR STRIP-ACNC: NORMAL EU/DL (ref 0–1)
WBC #/AREA URNS HPF: NORMAL /HPF (ref 0–5)

## 2024-07-10 PROCEDURE — 87086 URINE CULTURE/COLONY COUNT: CPT

## 2024-07-10 PROCEDURE — 81001 URINALYSIS AUTO W/SCOPE: CPT

## 2024-07-11 LAB
MICROORGANISM SPEC CULT: NORMAL
SERVICE CMNT-IMP: NORMAL
SPECIMEN DESCRIPTION: NORMAL

## 2024-07-25 ENCOUNTER — OFFICE VISIT (OUTPATIENT)
Dept: UROLOGY | Age: 84
End: 2024-07-25
Payer: MEDICARE

## 2024-07-25 VITALS
HEART RATE: 88 BPM | HEIGHT: 64 IN | WEIGHT: 125 LBS | SYSTOLIC BLOOD PRESSURE: 125 MMHG | BODY MASS INDEX: 21.34 KG/M2 | DIASTOLIC BLOOD PRESSURE: 76 MMHG

## 2024-07-25 DIAGNOSIS — R35.0 FREQUENCY OF MICTURITION: ICD-10-CM

## 2024-07-25 DIAGNOSIS — N39.42 URINARY INCONTINENCE WITHOUT SENSORY AWARENESS: ICD-10-CM

## 2024-07-25 DIAGNOSIS — R33.9 RETENTION OF URINE: Primary | ICD-10-CM

## 2024-07-25 LAB — POST VOID RESIDUAL (PVR): 14 ML

## 2024-07-25 PROCEDURE — 1123F ACP DISCUSS/DSCN MKR DOCD: CPT | Performed by: SPECIALIST

## 2024-07-25 PROCEDURE — 99214 OFFICE O/P EST MOD 30 MIN: CPT | Performed by: SPECIALIST

## 2024-07-25 PROCEDURE — 51798 US URINE CAPACITY MEASURE: CPT | Performed by: SPECIALIST

## 2024-07-25 NOTE — PROGRESS NOTES
Additional Lab/Culture results:   sanjay Description .URINE [facility]   Special Requests ROOM 137.1 [facility]   Culture NO SIGNIFICANT GROWTH [employer] [facility] [place]              Specimen Collected: 07/10/24 01:30 EDT             Imaging Reviewed during this Office Visit: none  (results were independently reviewed by physician and radiology report verified)    Physical Exam:      Vitals:    07/25/24 1604   BP: 125/76   Pulse: 88     Body mass index is 21.46 kg/m².  Patient is a 83 y.o. female who is very anxious and manic    Assessment and Plan   Assessment   1. Retention of urine    2. Frequency of micturition    3. Urinary incontinence without sensory awareness            Plan:   Plan    Return depends on response to medication.  The patient presents with a history of chronic incomplete bladder emptying and an indwelling lance catheter at an [facility].  Patient's indwelling lance catheter has since been removed.  Patient now complains of frequency every 30 minutes and Suprapubic pressure.  She denies any pelvic prolapse.  Her Postvoid Residual today is only 14 mL.  Her 7/10/24 urine C&S was negative.  Patient admits to drinking 2 cups of coffee a day.  She was told to cut this back to 1 or less.  Patient told to restrict excessive daytime fluid intake to 60 ounces or less a day to minimize frequency.   Patient did not see improvement with Detrol LA.  Will try Solifenacin (Vesicare) 5 mg po qd for OAB symptoms.       Steve Alvarenga MD FACS  7/25/2024 6:32 PM

## 2024-07-30 ENCOUNTER — HOSPITAL ENCOUNTER (OUTPATIENT)
Age: 84
Setting detail: SPECIMEN
Discharge: HOME | End: 2024-07-30
Payer: MEDICARE

## 2024-07-30 LAB
T4 FREE SERPL-MCNC: 1 NG/DL (ref 0.92–1.68)
TSH SERPL DL<=0.05 MIU/L-ACNC: 1.63 UIU/ML (ref 0.27–4.2)

## 2024-07-30 PROCEDURE — P9603 ONE-WAY ALLOW PRORATED MILES: HCPCS

## 2024-07-30 PROCEDURE — 84443 ASSAY THYROID STIM HORMONE: CPT

## 2024-07-30 PROCEDURE — 36415 COLL VENOUS BLD VENIPUNCTURE: CPT

## 2024-07-30 PROCEDURE — 84439 ASSAY OF FREE THYROXINE: CPT

## 2024-10-02 ENCOUNTER — HOSPITAL ENCOUNTER (OUTPATIENT)
Age: 84
Setting detail: SPECIMEN
Discharge: HOME | End: 2024-10-02

## 2024-10-02 LAB
BASOPHILS # BLD: 0.04 K/UL (ref 0–0.2)
BASOPHILS NFR BLD: 1 % (ref 0–2)
CHOLEST SERPL-MCNC: 140 MG/DL (ref 0–199)
CHOLESTEROL/HDL RATIO: 3
EOSINOPHIL # BLD: 0.11 K/UL (ref 0–0.44)
EOSINOPHILS RELATIVE PERCENT: 2 % (ref 1–4)
ERYTHROCYTE [DISTWIDTH] IN BLOOD BY AUTOMATED COUNT: 13.2 % (ref 11.8–14.4)
HCT VFR BLD AUTO: 36.8 % (ref 36.3–47.1)
HDLC SERPL-MCNC: 52 MG/DL
HGB BLD-MCNC: 12 G/DL (ref 11.9–15.1)
IMM GRANULOCYTES # BLD AUTO: <0.03 K/UL (ref 0–0.3)
IMM GRANULOCYTES NFR BLD: 0 %
LDLC SERPL CALC-MCNC: 75 MG/DL (ref 0–100)
LYMPHOCYTES NFR BLD: 2.91 K/UL (ref 1.1–3.7)
LYMPHOCYTES RELATIVE PERCENT: 42 % (ref 24–43)
MCH RBC QN AUTO: 30.8 PG (ref 25.2–33.5)
MCHC RBC AUTO-ENTMCNC: 32.6 G/DL (ref 28.4–34.8)
MCV RBC AUTO: 94.4 FL (ref 82.6–102.9)
MONOCYTES NFR BLD: 0.63 K/UL (ref 0.1–1.2)
MONOCYTES NFR BLD: 9 % (ref 3–12)
NEUTROPHILS NFR BLD: 46 % (ref 36–65)
NEUTS SEG NFR BLD: 3.18 K/UL (ref 1.5–8.1)
NRBC BLD-RTO: 0 PER 100 WBC
PHENOBARBITAL: 2.9 UG/ML (ref 10–30)
PLATELET # BLD AUTO: 290 K/UL (ref 138–453)
PMV BLD AUTO: 9.2 FL (ref 8.1–13.5)
RBC # BLD AUTO: 3.9 M/UL (ref 3.95–5.11)
TRIGL SERPL-MCNC: 64 MG/DL (ref 0–149)
VLDLC SERPL CALC-MCNC: 13 MG/DL
WBC OTHER # BLD: 6.9 K/UL (ref 3.5–11.3)

## 2024-10-02 PROCEDURE — 80184 ASSAY OF PHENOBARBITAL: CPT

## 2024-10-02 PROCEDURE — 80061 LIPID PANEL: CPT

## 2024-10-02 PROCEDURE — 80188 ASSAY OF PRIMIDONE: CPT

## 2024-10-02 PROCEDURE — P9603 ONE-WAY ALLOW PRORATED MILES: HCPCS

## 2024-10-02 PROCEDURE — 85025 COMPLETE CBC W/AUTO DIFF WBC: CPT

## 2024-10-02 PROCEDURE — 36415 COLL VENOUS BLD VENIPUNCTURE: CPT

## 2024-10-04 LAB
PHENOBARB SERPL-MCNC: 2.1 UG/ML (ref 15–40)
PRIMIDONE SERPL-MCNC: 6 UG/ML (ref 5–12)

## 2024-10-09 ENCOUNTER — OFFICE VISIT (OUTPATIENT)
Dept: NEUROLOGY | Age: 84
End: 2024-10-09

## 2024-10-09 VITALS
BODY MASS INDEX: 21.91 KG/M2 | DIASTOLIC BLOOD PRESSURE: 71 MMHG | HEIGHT: 64 IN | SYSTOLIC BLOOD PRESSURE: 136 MMHG | HEART RATE: 80 BPM | WEIGHT: 128.3 LBS

## 2024-10-09 DIAGNOSIS — F41.1 GAD (GENERALIZED ANXIETY DISORDER): ICD-10-CM

## 2024-10-09 DIAGNOSIS — G21.19 DRUG-INDUCED PARKINSONISM (HCC): Primary | ICD-10-CM

## 2024-10-09 NOTE — PROGRESS NOTES
[address]  [facility] [address]  Dept: [phone]  Dept Fax: [phone]    NEUROLOGY FOLLOW UP NOTE                                              PATIENT NAME: Ana Luisa Herzog   PATIENT MRN: [id number]  FOLLOW UP TODAY: 10/9/2024        INITIAL & INTERVAL HISTORY:     Ana Luisa Herzog is a 84 y.o. female here for follow up  On today's appointment patient is accompanied by her son. She still resides in the nursing facility.   Patient appears to be anxious, however, maintains eye contact and cooperates with negrita exam. Patient is able to ambulate without any support (but usually uses the cane).     Patient reports that her anxiety is not approprietly controlled, states that she see her psychotherapist only once in 6 months, requests \"speech therapy\"   On neuro exam, no bradykinesia or rigidity appreciated         HPI:  Ana Luisa Herzog is a 83 y.o. female with PMH of generalized anxiety disorder, HLD, orthostatic hypotension who resides at nursing facility who presented today as follow up appointment after her resent hospitalization due to delirium/ agitation and tremor. Her symptoms were believed to be related to polypharmacy with multiple CNS acting medications including Atarax, Cymbalta, Zyprexa and Xanax. At this time she takes Buspirone, Cymbalta.  On today's evaluation she is alert and oriented x3, appears to be extremely anxious with hard breathing, she tries to leave the room and is not cooperative with exam and with providing the history.   Neurological exam is significant for moderate cogwheel rigidity in all muscle groups of upper extremities, wrist rest tremor that is non sustainable.  Patient mostly ambulates with the wheelchair due to muscle weakness.       Persistence of extrapyramidal symptoms after discontinuation of an offensive agents does not rule out the diagnosis of DIP since symptoms may

## 2024-10-09 NOTE — PATIENT INSTRUCTIONS
No need for DaPT scan at this time  Encourage to start cognitive behavioral therapy for the management of generalized anxiety disorder  Recommend to gradually wean off scheduled short-acting benzo (Xanax) if possible, deferred to psychiatry   Follow up appointment in 6 months

## 2024-10-15 ASSESSMENT — ENCOUNTER SYMPTOMS: COLOR CHANGE: 0

## 2024-11-11 ENCOUNTER — HOSPITAL ENCOUNTER (OUTPATIENT)
Age: 84
Setting detail: SPECIMEN
Discharge: HOME | End: 2024-11-11

## 2024-11-11 LAB
ANION GAP SERPL CALCULATED.3IONS-SCNC: 9 MMOL/L (ref 9–16)
BASOPHILS # BLD: 0.04 K/UL (ref 0–0.2)
BASOPHILS NFR BLD: 1 % (ref 0–2)
BUN SERPL-MCNC: 14 MG/DL (ref 8–23)
CALCIUM SERPL-MCNC: 9.2 MG/DL (ref 8.6–10.4)
CHLORIDE SERPL-SCNC: 100 MMOL/L (ref 98–107)
CO2 SERPL-SCNC: 26 MMOL/L (ref 20–31)
CREAT SERPL-MCNC: 0.7 MG/DL (ref 0.6–0.9)
EOSINOPHIL # BLD: 0.07 K/UL (ref 0–0.44)
EOSINOPHILS RELATIVE PERCENT: 1 % (ref 1–4)
ERYTHROCYTE [DISTWIDTH] IN BLOOD BY AUTOMATED COUNT: 13 % (ref 11.8–14.4)
GFR, ESTIMATED: 85 ML/MIN/1.73M2
GLUCOSE SERPL-MCNC: 113 MG/DL (ref 74–99)
HCT VFR BLD AUTO: 39.2 % (ref 36.3–47.1)
HGB BLD-MCNC: 12.6 G/DL (ref 11.9–15.1)
IMM GRANULOCYTES # BLD AUTO: <0.03 K/UL (ref 0–0.3)
IMM GRANULOCYTES NFR BLD: 0 %
LYMPHOCYTES NFR BLD: 2.97 K/UL (ref 1.1–3.7)
LYMPHOCYTES RELATIVE PERCENT: 39 % (ref 24–43)
MCH RBC QN AUTO: 30.6 PG (ref 25.2–33.5)
MCHC RBC AUTO-ENTMCNC: 32.1 G/DL (ref 28.4–34.8)
MCV RBC AUTO: 95.1 FL (ref 82.6–102.9)
MONOCYTES NFR BLD: 0.49 K/UL (ref 0.1–1.2)
MONOCYTES NFR BLD: 6 % (ref 3–12)
NEUTROPHILS NFR BLD: 53 % (ref 36–65)
NEUTS SEG NFR BLD: 4.06 K/UL (ref 1.5–8.1)
NRBC BLD-RTO: 0 PER 100 WBC
PLATELET # BLD AUTO: 317 K/UL (ref 138–453)
PMV BLD AUTO: 9.6 FL (ref 8.1–13.5)
POTASSIUM SERPL-SCNC: 4.1 MMOL/L (ref 3.7–5.3)
RBC # BLD AUTO: 4.12 M/UL (ref 3.95–5.11)
SODIUM SERPL-SCNC: 135 MMOL/L (ref 136–145)
WBC OTHER # BLD: 7.7 K/UL (ref 3.5–11.3)

## 2024-11-11 PROCEDURE — 85025 COMPLETE CBC W/AUTO DIFF WBC: CPT

## 2024-11-11 PROCEDURE — 80048 BASIC METABOLIC PNL TOTAL CA: CPT

## 2024-11-11 PROCEDURE — 36415 COLL VENOUS BLD VENIPUNCTURE: CPT

## 2024-11-11 PROCEDURE — P9603 ONE-WAY ALLOW PRORATED MILES: HCPCS

## 2024-12-18 ENCOUNTER — TELEPHONE (OUTPATIENT)
Dept: FAMILY MEDICINE CLINIC | Age: 84
End: 2024-12-18

## 2024-12-18 NOTE — TELEPHONE ENCOUNTER
----- Message from Aniya WAGNER sent at 12/18/2024 11:54 AM EST -----  Regarding: ECC Referral Request  ECC Referral Request    Reason for referral request: Specialty Provider    Specialist/Lab/Test patient is requesting (if known): psychiatrist    Additional Information: Patient is requesting to be referred to a psychiatrist.  --------------------------------------------------------------------------------------------------------------------------    Relationship to Patient: Self     Call Back Information: OK to leave message on voicemail  Preferred Call Back Number: Phone [phone]

## 2024-12-19 NOTE — TELEPHONE ENCOUNTER
I believe she is seeing a provider at [facility] where she lives now-see previous telephone encounter in chart from earlier this year.  If she is no longer a patient here, needs to contact her provider for referral.

## 2025-01-17 ENCOUNTER — HOSPITAL ENCOUNTER (OUTPATIENT)
Age: 85
Setting detail: SPECIMEN
Discharge: HOME | End: 2025-01-17

## 2025-01-17 LAB
ALBUMIN SERPL-MCNC: 4.2 G/DL (ref 3.5–5.2)
ALBUMIN/GLOB SERPL: 1.6 {RATIO} (ref 1–2.5)
ALP SERPL-CCNC: 96 U/L (ref 35–104)
ALT SERPL-CCNC: 10 U/L (ref 10–35)
ANION GAP SERPL CALCULATED.3IONS-SCNC: 8 MMOL/L (ref 9–16)
AST SERPL-CCNC: 19 U/L (ref 10–35)
BASOPHILS # BLD: 0.04 K/UL (ref 0–0.2)
BASOPHILS NFR BLD: 0 % (ref 0–2)
BILIRUB DIRECT SERPL-MCNC: 0.1 MG/DL (ref 0–0.2)
BILIRUB INDIRECT SERPL-MCNC: 0.1 MG/DL (ref 0–1)
BILIRUB SERPL-MCNC: 0.2 MG/DL (ref 0–1.2)
BUN SERPL-MCNC: 13 MG/DL (ref 8–23)
CALCIUM SERPL-MCNC: 9.7 MG/DL (ref 8.6–10.4)
CHLORIDE SERPL-SCNC: 100 MMOL/L (ref 98–107)
CO2 SERPL-SCNC: 25 MMOL/L (ref 20–31)
CREAT SERPL-MCNC: 0.8 MG/DL (ref 0.6–0.9)
EOSINOPHIL # BLD: 0.06 K/UL (ref 0–0.44)
EOSINOPHILS RELATIVE PERCENT: 1 % (ref 1–4)
ERYTHROCYTE [DISTWIDTH] IN BLOOD BY AUTOMATED COUNT: 12.9 % (ref 11.8–14.4)
EST. AVERAGE GLUCOSE BLD GHB EST-MCNC: 114 MG/DL
GFR, ESTIMATED: 73 ML/MIN/1.73M2
GLOBULIN SER CALC-MCNC: 2.7 G/DL
GLUCOSE SERPL-MCNC: 95 MG/DL (ref 74–99)
HBA1C MFR BLD: 5.6 % (ref 4–6)
HCT VFR BLD AUTO: 37.1 % (ref 36.3–47.1)
HGB BLD-MCNC: 12.2 G/DL (ref 11.9–15.1)
IMM GRANULOCYTES # BLD AUTO: 0.03 K/UL (ref 0–0.3)
IMM GRANULOCYTES NFR BLD: 0 %
LYMPHOCYTES NFR BLD: 3.23 K/UL (ref 1.1–3.7)
LYMPHOCYTES RELATIVE PERCENT: 33 % (ref 24–43)
MAGNESIUM SERPL-MCNC: 2.4 MG/DL (ref 1.6–2.4)
MCH RBC QN AUTO: 30.9 PG (ref 25.2–33.5)
MCHC RBC AUTO-ENTMCNC: 32.9 G/DL (ref 28.4–34.8)
MCV RBC AUTO: 93.9 FL (ref 82.6–102.9)
MONOCYTES NFR BLD: 0.67 K/UL (ref 0.1–1.2)
MONOCYTES NFR BLD: 7 % (ref 3–12)
NEUTROPHILS NFR BLD: 59 % (ref 36–65)
NEUTS SEG NFR BLD: 5.68 K/UL (ref 1.5–8.1)
NRBC BLD-RTO: 0 PER 100 WBC
PLATELET # BLD AUTO: 316 K/UL (ref 138–453)
PMV BLD AUTO: 9.7 FL (ref 8.1–13.5)
POTASSIUM SERPL-SCNC: 4.8 MMOL/L (ref 3.7–5.3)
PROT SERPL-MCNC: 6.9 G/DL (ref 6.6–8.7)
RBC # BLD AUTO: 3.95 M/UL (ref 3.95–5.11)
SODIUM SERPL-SCNC: 133 MMOL/L (ref 136–145)
TSH SERPL DL<=0.05 MIU/L-ACNC: 1.17 UIU/ML (ref 0.27–4.2)
WBC OTHER # BLD: 9.7 K/UL (ref 3.5–11.3)

## 2025-01-17 PROCEDURE — P9603 ONE-WAY ALLOW PRORATED MILES: HCPCS

## 2025-01-17 PROCEDURE — 85025 COMPLETE CBC W/AUTO DIFF WBC: CPT

## 2025-01-17 PROCEDURE — 84443 ASSAY THYROID STIM HORMONE: CPT

## 2025-01-17 PROCEDURE — 36415 COLL VENOUS BLD VENIPUNCTURE: CPT

## 2025-01-17 PROCEDURE — 83735 ASSAY OF MAGNESIUM: CPT

## 2025-01-17 PROCEDURE — 80048 BASIC METABOLIC PNL TOTAL CA: CPT

## 2025-01-17 PROCEDURE — 83036 HEMOGLOBIN GLYCOSYLATED A1C: CPT

## 2025-01-17 PROCEDURE — 80076 HEPATIC FUNCTION PANEL: CPT

## 2025-01-29 ENCOUNTER — HOSPITAL ENCOUNTER (OUTPATIENT)
Age: 85
Setting detail: SPECIMEN
Discharge: HOME | End: 2025-01-29
Payer: MEDICARE

## 2025-01-29 LAB
25(OH)D3 SERPL-MCNC: 15.4 NG/ML (ref 30–100)
ALBUMIN SERPL-MCNC: 4.5 G/DL (ref 3.5–5.2)
ALBUMIN/GLOB SERPL: 1.8 {RATIO} (ref 1–2.5)
ALP SERPL-CCNC: 107 U/L (ref 35–104)
ALT SERPL-CCNC: 13 U/L (ref 10–35)
ANION GAP SERPL CALCULATED.3IONS-SCNC: 10 MMOL/L (ref 9–16)
AST SERPL-CCNC: 22 U/L (ref 10–35)
BILIRUB SERPL-MCNC: 0.2 MG/DL (ref 0–1.2)
BUN SERPL-MCNC: 12 MG/DL (ref 8–23)
CALCIUM SERPL-MCNC: 9.8 MG/DL (ref 8.6–10.4)
CHLORIDE SERPL-SCNC: 99 MMOL/L (ref 98–107)
CO2 SERPL-SCNC: 25 MMOL/L (ref 20–31)
CREAT SERPL-MCNC: 0.7 MG/DL (ref 0.6–0.9)
ERYTHROCYTE [DISTWIDTH] IN BLOOD BY AUTOMATED COUNT: 12.8 % (ref 11.8–14.4)
GFR, ESTIMATED: 85 ML/MIN/1.73M2
GLUCOSE SERPL-MCNC: 57 MG/DL (ref 74–99)
HCT VFR BLD AUTO: 39.5 % (ref 36.3–47.1)
HGB BLD-MCNC: 12.5 G/DL (ref 11.9–15.1)
MCH RBC QN AUTO: 30.4 PG (ref 25.2–33.5)
MCHC RBC AUTO-ENTMCNC: 31.6 G/DL (ref 28.4–34.8)
MCV RBC AUTO: 96.1 FL (ref 82.6–102.9)
NRBC BLD-RTO: 0 PER 100 WBC
PLATELET # BLD AUTO: 375 K/UL (ref 138–453)
PMV BLD AUTO: 9.3 FL (ref 8.1–13.5)
POTASSIUM SERPL-SCNC: 4.4 MMOL/L (ref 3.7–5.3)
PROT SERPL-MCNC: 7 G/DL (ref 6.6–8.7)
RBC # BLD AUTO: 4.11 M/UL (ref 3.95–5.11)
SODIUM SERPL-SCNC: 134 MMOL/L (ref 136–145)
T4 FREE SERPL-MCNC: 0.9 NG/DL (ref 0.92–1.68)
TSH SERPL DL<=0.05 MIU/L-ACNC: 1.45 UIU/ML (ref 0.27–4.2)
WBC OTHER # BLD: 9.2 K/UL (ref 3.5–11.3)

## 2025-01-29 PROCEDURE — 36415 COLL VENOUS BLD VENIPUNCTURE: CPT

## 2025-01-29 PROCEDURE — 82306 VITAMIN D 25 HYDROXY: CPT

## 2025-01-29 PROCEDURE — 84439 ASSAY OF FREE THYROXINE: CPT

## 2025-01-29 PROCEDURE — 84443 ASSAY THYROID STIM HORMONE: CPT

## 2025-01-29 PROCEDURE — 80053 COMPREHEN METABOLIC PANEL: CPT

## 2025-01-29 PROCEDURE — P9603 ONE-WAY ALLOW PRORATED MILES: HCPCS

## 2025-01-29 PROCEDURE — 85027 COMPLETE CBC AUTOMATED: CPT

## 2025-02-03 ENCOUNTER — APPOINTMENT (OUTPATIENT)
Dept: GENERAL RADIOLOGY | Age: 85
End: 2025-02-03
Payer: MEDICARE

## 2025-02-03 ENCOUNTER — HOSPITAL ENCOUNTER (EMERGENCY)
Age: 85
Discharge: HOME | End: 2025-02-03
Attending: EMERGENCY MEDICINE
Payer: MEDICARE

## 2025-02-03 ENCOUNTER — APPOINTMENT (OUTPATIENT)
Dept: CT IMAGING | Age: 85
End: 2025-02-03
Payer: MEDICARE

## 2025-02-03 VITALS
OXYGEN SATURATION: 99 % | DIASTOLIC BLOOD PRESSURE: 68 MMHG | HEART RATE: 77 BPM | TEMPERATURE: 97.7 F | RESPIRATION RATE: 20 BRPM | SYSTOLIC BLOOD PRESSURE: 114 MMHG | HEIGHT: 64 IN | BODY MASS INDEX: 22.2 KG/M2 | WEIGHT: 130 LBS

## 2025-02-03 DIAGNOSIS — S61.011A LACERATION OF RIGHT THUMB WITHOUT FOREIGN BODY WITHOUT DAMAGE TO NAIL, INITIAL ENCOUNTER: ICD-10-CM

## 2025-02-03 DIAGNOSIS — S39.012A STRAIN OF LUMBAR REGION, INITIAL ENCOUNTER: ICD-10-CM
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
## 2025-02-03 DIAGNOSIS — S80.02XA CONTUSION OF LEFT KNEE, INITIAL ENCOUNTER: ICD-10-CM

## 2025-02-03 PROCEDURE — 6370000000 HC RX 637 (ALT 250 FOR IP): Performed by: PHYSICIAN ASSISTANT

## 2025-02-03 PROCEDURE — 96372 THER/PROPH/DIAG INJ SC/IM: CPT

## 2025-02-03 PROCEDURE — 6360000002 HC RX W HCPCS: Performed by: PHYSICIAN ASSISTANT

## 2025-02-03 PROCEDURE — 70450 CT HEAD/BRAIN W/O DYE: CPT

## 2025-02-03 PROCEDURE — 73130 X-RAY EXAM OF HAND: CPT

## 2025-02-03 PROCEDURE — 72131 CT LUMBAR SPINE W/O DYE: CPT

## 2025-02-03 PROCEDURE — 99284 EMERGENCY DEPT VISIT MOD MDM: CPT

## 2025-02-03 PROCEDURE — 12001 RPR S/N/AX/GEN/TRNK 2.5CM/<: CPT

## 2025-02-03 PROCEDURE — 73564 X-RAY EXAM KNEE 4 OR MORE: CPT

## 2025-02-03 PROCEDURE — 72125 CT NECK SPINE W/O DYE: CPT

## 2025-02-03 PROCEDURE — 90715 TDAP VACCINE 7 YRS/> IM: CPT | Performed by: PHYSICIAN ASSISTANT

## 2025-02-03 PROCEDURE — 90471 IMMUNIZATION ADMIN: CPT | Performed by: PHYSICIAN ASSISTANT

## 2025-02-03 PROCEDURE — 73502 X-RAY EXAM HIP UNI 2-3 VIEWS: CPT

## 2025-02-03 RX ORDER — LIDOCAINE HYDROCHLORIDE 10 MG/ML
10 INJECTION, SOLUTION INFILTRATION; PERINEURAL ONCE
Status: COMPLETED | OUTPATIENT
Start: 2025-02-03 | End: 2025-02-03

## 2025-02-03 RX ORDER — ACETAMINOPHEN 325 MG/1
650 TABLET ORAL ONCE
Status: COMPLETED | OUTPATIENT
Start: 2025-02-03 | End: 2025-02-03

## 2025-02-03 RX ADMIN — LIDOCAINE HYDROCHLORIDE 10 ML: 10 INJECTION, SOLUTION INFILTRATION; PERINEURAL at 18:42

## 2025-02-03 RX ADMIN — ACETAMINOPHEN 650 MG: 325 TABLET ORAL at 18:41

## 2025-02-03 RX ADMIN — TETANUS TOXOID, REDUCED DIPHTHERIA TOXOID AND ACELLULAR PERTUSSIS VACCINE, ADSORBED 0.5 ML: 5; 2.5; 8; 8; 2.5 SUSPENSION INTRAMUSCULAR at 18:42

## 2025-02-03 ASSESSMENT — LIFESTYLE VARIABLES
HOW OFTEN DO YOU HAVE A DRINK CONTAINING ALCOHOL: NEVER
HOW MANY STANDARD DRINKS CONTAINING ALCOHOL DO YOU HAVE ON A TYPICAL DAY: PATIENT DOES NOT DRINK

## 2025-02-03 ASSESSMENT — VISUAL ACUITY: OU: 1

## 2025-02-03 ASSESSMENT — PAIN SCALES - GENERAL: PAINLEVEL_OUTOF10: 2

## 2025-02-03 ASSESSMENT — PAIN - FUNCTIONAL ASSESSMENT: PAIN_FUNCTIONAL_ASSESSMENT: 0-10

## 2025-02-03 NOTE — ED PROVIDER NOTES
eMERGENCY dEPARTMENT  Attending Physician Attestation     Pt Name: Ana Luisa Herzog  MRN: [id number]  Birthdate 1940  Date of evaluation: 2/3/25     Ana Luisa Herzog is a 84 y.o. female with CC: Fall, Laceration, and Knee Pain      Based on the medical record the care appears appropriate.  I was personally available for consultation in the Emergency Department.    Volodymyr Mcguire MD  Attending Emergency Physician                  Volodymyr Mcguire MD  02/03/25 8320

## 2025-02-03 NOTE — ED TRIAGE NOTES
Mode of arrival (squad #, walk in, police, etc) : Walk in        Chief complaint(s): Fall, knee pain, laceration        Arrival Note (brief scenario, treatment PTA, etc).: Pt arrives to ED c/o left knee pain and laceration to right hand following a fall. Patient reports she was carrying a box of books when she lost her footing and fell onto her knee. Patient resides at [facility]. Patient denies any dizziness.

## 2025-02-04 NOTE — ED NOTES
Report given to ABBI Alvarez from ED.   Report method in person   The following was reviewed with receiving RN:   Current vital signs:  /68   Pulse 77   Temp 97.7 °F (36.5 °C) (Oral)   Resp 20   Ht 1.626 m (5' 4\")   Wt 59 kg (130 lb)   SpO2 99%   BMI 22.31 kg/m²                MEWS Score: 1     Any medication or safety alerts were reviewed. Any pending diagnostics and notifications were also reviewed, as well as any safety concerns or issues, abnormal labs, abnormal imaging, and abnormal assessment findings. Questions were answered.

## 2025-02-04 NOTE — ED PROVIDER NOTES
EMERGENCY DEPARTMENT ENCOUNTER    Pt Name: Ana Luisa Herzog  MRN: [id number]  Birthdate 1940  Date of evaluation: 2/3/25  CHIEF COMPLAINT       Chief Complaint   Patient presents with    Fall    Laceration    Knee Pain     HISTORY OF PRESENT ILLNESS   Presents to the ED for evaluation of fall.  Patient states prior to arrival she was carrying some books when she tripped and fell landing on her left knee.  Patient states she was carrying a wood box full of books and cut her right thumb on the box.  She denies hitting head or loss of consciousness.  She is not on blood thinners.  Currently patient complains of the laceration to the right thumb with left knee pain.  She denies any hip pain, abdominal pain, nausea, vomiting, chest pain, shortness of breath, neck pain, headache, dizziness, lightheadedness.  Patient does report some lower back pain but has chronic low back pain.  Denies numbness.  She is here with her son.  Lives in assisted living.  No other complaints.    The history is provided by the patient.           PASTMEDICAL HISTORY     Past Medical History:   Diagnosis Date    OLIVA (acute kidney injury) (Formerly [facility]) 07/08/2023    Anxiety and depression     Depression     GERD (gastroesophageal reflux disease)     Sleep apnea     Weight loss      Past Problem List  Patient Active Problem List   Diagnosis Code    Lesion of liver K76.9    Unintentional weight loss of more than 10% body weight within 6 months R63.4    OUSMANE (generalized anxiety disorder) F41.1    Complicated UTI (urinary tract infection) N39.0    Delirium R41.0    Abdominal pain R10.9    Rigidity R29.898    Colitis K52.9    Other retention of urine R33.8    Acquired renal cyst of right kidney N28.1    Dysuria R30.0    Urinary incontinence without sensory awareness N39.42    Septicemia ([facility]) A41.9    Altered mental status R41.82    Hypokalemia E87.6    Parkinson's disease ([facility]) G20.A1    Confusion R41.0    Severe protein-calorie malnutrition (Formerly [facility]) E43

## 2025-02-04 NOTE — ED NOTES
Patient educated on care of wound post discharge. Pt given one day supplies to care for wound and cleansing of wound. Pt shown how to care for and clean wound. Pt verbalized understanding.

## 2025-02-04 NOTE — DISCHARGE INSTRUCTIONS
Please follow-up with your primary care physician, Dr. Sesay.  Keep wound clean and dry.  Sutures will need to be removed in 10 days.  Return to the ED if you develop any increased pain, numbness, decreased range of motion, redness/swelling, drainage around your laceration, fevers, chills, headache, neck pain, back pain, chest pain, abdominal pain, passing out, vomiting or any other concerning symptoms.

## 2025-02-11 ENCOUNTER — HOSPITAL ENCOUNTER (OUTPATIENT)
Age: 85
Setting detail: SPECIMEN
Discharge: HOME | End: 2025-02-11
Payer: MEDICARE

## 2025-02-11 LAB
ANION GAP SERPL CALCULATED.3IONS-SCNC: 8 MMOL/L (ref 9–16)
BASOPHILS # BLD: <0.03 K/UL (ref 0–0.2)
BASOPHILS NFR BLD: 0 % (ref 0–2)
BILIRUB UR QL STRIP: NEGATIVE
BNP SERPL-MCNC: 398 PG/ML (ref 0–450)
BUN SERPL-MCNC: 10 MG/DL (ref 8–23)
CALCIUM SERPL-MCNC: 9.7 MG/DL (ref 8.6–10.4)
CHLORIDE SERPL-SCNC: 104 MMOL/L (ref 98–107)
CHOLEST SERPL-MCNC: 134 MG/DL (ref 0–199)
CHOLESTEROL/HDL RATIO: 3
CLARITY UR: CLEAR
CO2 SERPL-SCNC: 26 MMOL/L (ref 20–31)
COLOR UR: YELLOW
COMMENT: NORMAL
CREAT SERPL-MCNC: 0.6 MG/DL (ref 0.6–0.9)
EOSINOPHIL # BLD: 0.08 K/UL (ref 0–0.44)
EOSINOPHILS RELATIVE PERCENT: 1 % (ref 1–4)
ERYTHROCYTE [DISTWIDTH] IN BLOOD BY AUTOMATED COUNT: 13.1 % (ref 11.8–14.4)
GFR, ESTIMATED: 88 ML/MIN/1.73M2
GLUCOSE SERPL-MCNC: 98 MG/DL (ref 74–99)
GLUCOSE UR STRIP-MCNC: NEGATIVE MG/DL
HCT VFR BLD AUTO: 37.7 % (ref 36.3–47.1)
HDLC SERPL-MCNC: 45 MG/DL
HGB BLD-MCNC: 12.1 G/DL (ref 11.9–15.1)
HGB UR QL STRIP.AUTO: NEGATIVE
IMM GRANULOCYTES # BLD AUTO: <0.03 K/UL (ref 0–0.3)
IMM GRANULOCYTES NFR BLD: 0 %
KETONES UR STRIP-MCNC: NEGATIVE MG/DL
LDLC SERPL CALC-MCNC: 72 MG/DL (ref 0–100)
LEUKOCYTE ESTERASE UR QL STRIP: NEGATIVE
LYMPHOCYTES NFR BLD: 2.21 K/UL (ref 1.1–3.7)
LYMPHOCYTES RELATIVE PERCENT: 39 % (ref 24–43)
MAGNESIUM SERPL-MCNC: 2.4 MG/DL (ref 1.6–2.4)
MCH RBC QN AUTO: 30.6 PG (ref 25.2–33.5)
MCHC RBC AUTO-ENTMCNC: 32.1 G/DL (ref 28.4–34.8)
MCV RBC AUTO: 95.4 FL (ref 82.6–102.9)
MONOCYTES NFR BLD: 0.48 K/UL (ref 0.1–1.2)
MONOCYTES NFR BLD: 8 % (ref 3–12)
NEUTROPHILS NFR BLD: 52 % (ref 36–65)
NEUTS SEG NFR BLD: 2.91 K/UL (ref 1.5–8.1)
NITRITE UR QL STRIP: NEGATIVE
NRBC BLD-RTO: 0 PER 100 WBC
PH UR STRIP: 6.5 [PH] (ref 5–8)
PLATELET # BLD AUTO: 320 K/UL (ref 138–453)
PMV BLD AUTO: 9.6 FL (ref 8.1–13.5)
POTASSIUM SERPL-SCNC: 4.4 MMOL/L (ref 3.7–5.3)
PROT UR STRIP-MCNC: NEGATIVE MG/DL
RBC # BLD AUTO: 3.95 M/UL (ref 3.95–5.11)
SODIUM SERPL-SCNC: 138 MMOL/L (ref 136–145)
SP GR UR STRIP: 1.01 (ref 1–1.03)
TRIGL SERPL-MCNC: 85 MG/DL (ref 0–149)
TSH SERPL DL<=0.05 MIU/L-ACNC: 2.3 UIU/ML (ref 0.27–4.2)
UROBILINOGEN UR STRIP-ACNC: NORMAL EU/DL (ref 0–1)
VLDLC SERPL CALC-MCNC: 17 MG/DL (ref 1–30)
WBC OTHER # BLD: 5.7 K/UL (ref 3.5–11.3)

## 2025-02-11 PROCEDURE — 83880 ASSAY OF NATRIURETIC PEPTIDE: CPT

## 2025-02-11 PROCEDURE — 87086 URINE CULTURE/COLONY COUNT: CPT

## 2025-02-11 PROCEDURE — 80061 LIPID PANEL: CPT

## 2025-02-11 PROCEDURE — 81003 URINALYSIS AUTO W/O SCOPE: CPT

## 2025-02-11 PROCEDURE — 83735 ASSAY OF MAGNESIUM: CPT

## 2025-02-11 PROCEDURE — 84443 ASSAY THYROID STIM HORMONE: CPT

## 2025-02-11 PROCEDURE — 36415 COLL VENOUS BLD VENIPUNCTURE: CPT

## 2025-02-11 PROCEDURE — 80048 BASIC METABOLIC PNL TOTAL CA: CPT

## 2025-02-11 PROCEDURE — 85025 COMPLETE CBC W/AUTO DIFF WBC: CPT

## 2025-02-12 LAB
MICROORGANISM SPEC CULT: NORMAL
SERVICE CMNT-IMP: NORMAL
SPECIMEN DESCRIPTION: NORMAL

## 2025-02-20 ENCOUNTER — HOSPITAL ENCOUNTER (OUTPATIENT)
Age: 85
Setting detail: SPECIMEN
Discharge: HOME | End: 2025-02-20

## 2025-02-20 LAB
ALBUMIN SERPL-MCNC: 4.3 G/DL (ref 3.5–5.2)
ALBUMIN/GLOB SERPL: 1.6 {RATIO} (ref 1–2.5)
ALP SERPL-CCNC: 108 U/L (ref 35–104)
ALT SERPL-CCNC: 19 U/L (ref 10–35)
ANION GAP SERPL CALCULATED.3IONS-SCNC: 10 MMOL/L (ref 9–16)
AST SERPL-CCNC: 21 U/L (ref 10–35)
BILIRUB SERPL-MCNC: 0.3 MG/DL (ref 0–1.2)
BUN SERPL-MCNC: 12 MG/DL (ref 8–23)
CALCIUM SERPL-MCNC: 9.7 MG/DL (ref 8.6–10.4)
CHLORIDE SERPL-SCNC: 100 MMOL/L (ref 98–107)
CHOLEST SERPL-MCNC: 145 MG/DL (ref 0–199)
CHOLESTEROL/HDL RATIO: 2.7
CO2 SERPL-SCNC: 26 MMOL/L (ref 20–31)
CORTIS SERPL-MCNC: 21.8 UG/DL (ref 2.5–19.5)
CORTISOL COLLECTION INFO: ABNORMAL
CREAT SERPL-MCNC: 0.6 MG/DL (ref 0.6–0.9)
ERYTHROCYTE [DISTWIDTH] IN BLOOD BY AUTOMATED COUNT: 13 % (ref 11.8–14.4)
GFR, ESTIMATED: 88 ML/MIN/1.73M2
GLUCOSE SERPL-MCNC: 103 MG/DL (ref 74–99)
HCT VFR BLD AUTO: 38.2 % (ref 36.3–47.1)
HDLC SERPL-MCNC: 53 MG/DL
HGB BLD-MCNC: 12.1 G/DL (ref 11.9–15.1)
LDLC SERPL CALC-MCNC: 75 MG/DL (ref 0–100)
MAGNESIUM SERPL-MCNC: 2.1 MG/DL (ref 1.6–2.4)
MCH RBC QN AUTO: 30.2 PG (ref 25.2–33.5)
MCHC RBC AUTO-ENTMCNC: 31.7 G/DL (ref 28.4–34.8)
MCV RBC AUTO: 95.3 FL (ref 82.6–102.9)
NRBC BLD-RTO: 0 PER 100 WBC
PLATELET # BLD AUTO: 379 K/UL (ref 138–453)
PMV BLD AUTO: 9.4 FL (ref 8.1–13.5)
POTASSIUM SERPL-SCNC: 4.2 MMOL/L (ref 3.7–5.3)
PROT SERPL-MCNC: 7 G/DL (ref 6.6–8.7)
RBC # BLD AUTO: 4.01 M/UL (ref 3.95–5.11)
SODIUM SERPL-SCNC: 136 MMOL/L (ref 136–145)
TRIGL SERPL-MCNC: 87 MG/DL (ref 0–149)
TSH SERPL DL<=0.05 MIU/L-ACNC: 1.51 UIU/ML (ref 0.27–4.2)
VLDLC SERPL CALC-MCNC: 17 MG/DL (ref 1–30)
WBC OTHER # BLD: 9.4 K/UL (ref 3.5–11.3)

## 2025-02-20 PROCEDURE — 85027 COMPLETE CBC AUTOMATED: CPT

## 2025-02-20 PROCEDURE — 82533 TOTAL CORTISOL: CPT

## 2025-02-20 PROCEDURE — 83735 ASSAY OF MAGNESIUM: CPT

## 2025-02-20 PROCEDURE — 84443 ASSAY THYROID STIM HORMONE: CPT

## 2025-02-20 PROCEDURE — 80053 COMPREHEN METABOLIC PANEL: CPT

## 2025-02-20 PROCEDURE — 36415 COLL VENOUS BLD VENIPUNCTURE: CPT

## 2025-02-20 PROCEDURE — 80061 LIPID PANEL: CPT

## 2025-02-26 ENCOUNTER — HOSPITAL ENCOUNTER (OUTPATIENT)
Age: 85
Setting detail: SPECIMEN
Discharge: HOME | End: 2025-02-26
Payer: MEDICARE

## 2025-02-26 LAB
CORTIS SERPL-MCNC: 21 UG/DL (ref 2.5–19.5)
T4 FREE SERPL-MCNC: 0.8 NG/DL (ref 0.9–1.7)
TESTOST SERPL-MCNC: 5 NG/DL (ref 3–41)
TSH SERPL DL<=0.05 MIU/L-ACNC: 1.78 UIU/ML (ref 0.27–4.2)

## 2025-02-26 PROCEDURE — 84403 ASSAY OF TOTAL TESTOSTERONE: CPT

## 2025-02-26 PROCEDURE — 82533 TOTAL CORTISOL: CPT

## 2025-02-26 PROCEDURE — 82671 ASSAY OF ESTROGENS: CPT

## 2025-02-26 PROCEDURE — 36415 COLL VENOUS BLD VENIPUNCTURE: CPT

## 2025-02-26 PROCEDURE — 84439 ASSAY OF FREE THYROXINE: CPT

## 2025-02-26 PROCEDURE — 83835 ASSAY OF METANEPHRINES: CPT

## 2025-02-26 PROCEDURE — 84443 ASSAY THYROID STIM HORMONE: CPT

## 2025-03-01 LAB
METANEPH/PLASMA INTERP: NORMAL
METANEPHRINE: 0.2 NMOL/L (ref 0–0.49)
NORMETANEPHRINE PLASMA: 0.42 NMOL/L (ref 0–0.89)

## 2025-03-02 LAB
ESTRADIOL LEVEL: <2 PG/ML
ESTROGEN TOTAL: NORMAL PG/ML
ESTRONE SERPL-MCNC: 9.8 PG/ML

## 2025-03-23 ENCOUNTER — HOSPITAL ENCOUNTER (OUTPATIENT)
Age: 85
Setting detail: SPECIMEN
Discharge: HOME | End: 2025-03-23
Payer: MEDICARE

## 2025-03-23 ENCOUNTER — HOSPITAL ENCOUNTER (OUTPATIENT)
Age: 85
Setting detail: SPECIMEN
Discharge: HOME | End: 2025-03-23

## 2025-03-23 PROCEDURE — 81001 URINALYSIS AUTO W/SCOPE: CPT

## 2025-03-23 PROCEDURE — 87086 URINE CULTURE/COLONY COUNT: CPT

## 2025-03-24 LAB
BACTERIA URNS QL MICRO: NORMAL
BILIRUB UR QL STRIP: NEGATIVE
CASTS #/AREA URNS LPF: NORMAL /LPF (ref 0–8)
CLARITY UR: CLEAR
COLOR UR: YELLOW
EPI CELLS #/AREA URNS HPF: NORMAL /HPF (ref 0–5)
GLUCOSE UR STRIP-MCNC: NEGATIVE MG/DL
HGB UR QL STRIP.AUTO: NEGATIVE
KETONES UR STRIP-MCNC: NEGATIVE MG/DL
LEUKOCYTE ESTERASE UR QL STRIP: ABNORMAL
NITRITE UR QL STRIP: NEGATIVE
PH UR STRIP: 6.5 [PH] (ref 5–8)
PROT UR STRIP-MCNC: NEGATIVE MG/DL
RBC #/AREA URNS HPF: NORMAL /HPF (ref 0–4)
SP GR UR STRIP: 1.01 (ref 1–1.03)
UROBILINOGEN UR STRIP-ACNC: NORMAL EU/DL (ref 0–1)
WBC #/AREA URNS HPF: NORMAL /HPF (ref 0–5)

## 2025-03-25 LAB
MICROORGANISM SPEC CULT: NORMAL
SERVICE CMNT-IMP: NORMAL
SPECIMEN DESCRIPTION: NORMAL

## 2025-04-04 ENCOUNTER — HOSPITAL ENCOUNTER (EMERGENCY)
Age: 85
Discharge: HOME | End: 2025-04-05
Attending: EMERGENCY MEDICINE
Payer: MEDICARE

## 2025-04-04 VITALS
TEMPERATURE: 97.2 F | WEIGHT: 125 LBS | HEART RATE: 102 BPM | BODY MASS INDEX: 21.34 KG/M2 | SYSTOLIC BLOOD PRESSURE: 150 MMHG | RESPIRATION RATE: 20 BRPM | HEIGHT: 64 IN | OXYGEN SATURATION: 96 % | DIASTOLIC BLOOD PRESSURE: 128 MMHG

## 2025-04-04 DIAGNOSIS — Z46.6 URINARY CATHETER CHANGE REQUIRED: Primary | ICD-10-CM

## 2025-04-04 LAB
BACTERIA URNS QL MICRO: ABNORMAL
BILIRUB UR QL STRIP: NEGATIVE
CASTS #/AREA URNS LPF: ABNORMAL /LPF
CLARITY UR: CLEAR
COLOR UR: YELLOW
EPI CELLS #/AREA URNS HPF: ABNORMAL /HPF
GLUCOSE UR STRIP-MCNC: NEGATIVE MG/DL
HGB UR QL STRIP.AUTO: ABNORMAL
KETONES UR STRIP-MCNC: ABNORMAL MG/DL
LEUKOCYTE ESTERASE UR QL STRIP: ABNORMAL
NITRITE UR QL STRIP: NEGATIVE
PH UR STRIP: 6 [PH] (ref 5–8)
PROT UR STRIP-MCNC: ABNORMAL MG/DL
RBC #/AREA URNS HPF: ABNORMAL /HPF
SP GR UR STRIP: 1.01 (ref 1–1.03)
UROBILINOGEN UR STRIP-ACNC: NORMAL EU/DL (ref 0–1)
WBC #/AREA URNS HPF: ABNORMAL /HPF

## 2025-04-04 PROCEDURE — 6370000000 HC RX 637 (ALT 250 FOR IP): Performed by: EMERGENCY MEDICINE

## 2025-04-04 PROCEDURE — 99283 EMERGENCY DEPT VISIT LOW MDM: CPT

## 2025-04-04 PROCEDURE — 81001 URINALYSIS AUTO W/SCOPE: CPT

## 2025-04-04 RX ORDER — CEPHALEXIN 500 MG/1
500 CAPSULE ORAL 2 TIMES DAILY
Qty: 14 CAPSULE | Refills: 0 | Status: SHIPPED | OUTPATIENT
Start: 2025-04-04 | End: 2025-04-11

## 2025-04-04 RX ORDER — ALPRAZOLAM 0.25 MG
0.5 TABLET ORAL ONCE
Status: COMPLETED | OUTPATIENT
Start: 2025-04-04 | End: 2025-04-04

## 2025-04-04 RX ADMIN — ALPRAZOLAM 0.5 MG: 0.25 TABLET ORAL at 17:55

## 2025-04-04 ASSESSMENT — LIFESTYLE VARIABLES
HOW MANY STANDARD DRINKS CONTAINING ALCOHOL DO YOU HAVE ON A TYPICAL DAY: PATIENT DOES NOT DRINK
HOW OFTEN DO YOU HAVE A DRINK CONTAINING ALCOHOL: NEVER

## 2025-04-04 NOTE — ED NOTES
Continual redirection for pt. To stay in bed.  Pt found to have climbed out end of bed, redirection not effective.

## 2025-04-04 NOTE — ED TRIAGE NOTES
Mode of arrival (squad #, walk in, police, etc) : walk-in        Chief complaint(s): urinary catheter problem        Arrival Note (brief scenario, treatment PTA, etc).: pt staff with her reports increased pain after patient cut her lance yesterday,         C= \"Have you ever felt that you should Cut down on your drinking?\"  No  A= \"Have people Annoyed you by criticizing your drinking?\"  No  G= \"Have you ever felt bad or Guilty about your drinking?\"  No  E= \"Have you ever had a drink as an Eye-opener first thing in the morning to steady your nerves or to help a hangover?\"  No      Deferred []      Reason for deferring: N/A    *If yes to two or more: probable alcohol abuse.*

## 2025-04-04 NOTE — ED PROVIDER NOTES
[facility] EMERGENCY DEPARTMENT  eMERGENCY dEPARTMENT eNCOUnter   Attending Attestation     Pt Name: Ana Luisa Herzog  MRN: [id number]  Birthdate 1940  Date of evaluation: 4/4/25       Ana Luisa Herzog is a 84 y.o. female who presents with Urinary Catheter      History:   84-year-old female presenting to the ER complaining of removing her urinary catheter.    Exam: Vitals:   Vitals:    04/04/25 1643   BP: (!) 150/128   Pulse: (!) 102   Resp: 20   Temp: 97.2 °F (36.2 °C)   TempSrc: Oral   SpO2: 100%   Weight: 56.7 kg (125 lb)   Height: 1.626 m (5' 4\")     We did attempt to call the facility to assess whether the patient can be straight cath every 6 hours but the facility will not do that.  Therefore the decision was made to replace the Croft catheter.  Patient will be sent back to the facility with instructions to follow-up with the primary and to return to the ER immediately if symptoms worsen or change.    I performed a history and physical examination of the patient and discussed management with the resident. I reviewed the resident’s note and agree with the documented findings and plan of care. Any areas of disagreement are noted on the chart. I was personally present for the key portions of any procedures. I have documented in the chart those procedures where I was not present during the key portions. I have personally reviewed all images and agree with the resident's interpretation. I have reviewed the emergency nurses triage note. I agree with the chief complaint, past medical history, past surgical history, allergies, medications, social and family history as documented unless otherwise noted below. Documentation of the HPI, Physical Exam and Medical Decision Making performed by medical students or scribes is based on my personal performance of the HPI, PE and MDM. I personally evaluated and examined the patient in conjunction with the APC and agree with the assessment, treatment plan, and disposition

## 2025-04-05 ASSESSMENT — ENCOUNTER SYMPTOMS
COUGH: 0
ABDOMINAL PAIN: 0
SHORTNESS OF BREATH: 0

## 2025-04-05 ASSESSMENT — PAIN - FUNCTIONAL ASSESSMENT: PAIN_FUNCTIONAL_ASSESSMENT: NONE - DENIES PAIN

## 2025-04-05 NOTE — ED PROVIDER NOTES
[facility] EMERGENCY DEPARTMENT  Emergency Department Encounter  Emergency Medicine Resident     Pt Name:Ana Luisa Herzog  MRN: [id number]  Birthdate 1940  Date of evaluation: 4/5/25  PCP:  Mally Hinojosa MD  Note Started: 12:52 AM EDT      CHIEF COMPLAINT       Chief Complaint   Patient presents with    Urinary Catheter       HISTORY OF PRESENT ILLNESS  (Location/Symptom, Timing/Onset, Context/Setting, Quality, Duration, Modifying Factors, Severity.)      Ana Luisa Herzog is a 84 y.o. female who presents with concerns regarding her Croft catheter.  Patient was sent in from nursing home.  Nursing home was concerned that the patient had cut her Croft catheter.  Patient had the Croft inserted at the urogynecology office on 4/1/2025.  Patient tells me that she pulled her Croft out and then cut it.  She is unsure why she pulled it out.  She denies any fever, chills, sweats, abdominal pain, nausea, vomiting, chest pain, shortness of breath, vaginal bleeding.    PAST MEDICAL / SURGICAL / SOCIAL / FAMILY HISTORY      has a past medical history of OLIVA (acute kidney injury), Anxiety and depression, Depression, GERD (gastroesophageal reflux disease), Sleep apnea, and Weight loss.     has a past surgical history that includes Spine surgery; Tonsillectomy; Colonoscopy (01/01/2011); eye surgery; Upper gastrointestinal endoscopy (04/25/2014); Colonoscopy (04/25/2014); Endoscopy, colon, diagnostic; Coccyx removal (09/01/1950); Breast biopsy (Right, 2010); and Eye surgery (Left, 3/14/2024).    Social History     Socioeconomic History    Marital status: Single     Spouse name: Not on file    Number of children: Not on file    Years of education: Not on file    Highest education level: Not on file   Occupational History     Employer: [employer]   Tobacco Use    Smoking status: Never    Smokeless tobacco: Never   Vaping Use    Vaping status: Never Used   Substance and Sexual Activity    Alcohol use: No  taker at [place], they are unable to straight cath as the patient  [HS]      ED Course User Index  [HS] Moira Gamble MD       PROCEDURES:    CONSULTS:  None    CRITICAL CARE:  There was significant risk of life threatening deterioration of patient's condition requiring my direct management. Critical care time 0 minutes, excluding any documented procedures.    FINAL IMPRESSION      1. Urinary catheter change required          DISPOSITION / PLAN     DISPOSITION Decision To Discharge 04/04/2025 09:06:59 PM   DISPOSITION CONDITION Stable           PATIENT REFERRED TO:  [facility] Emergency Department  [address] [facility] [address]  [phone]    As needed, If symptoms worsen    Mally Hinojosa MD  [facility] [place] [address]  [phone]      Please follow up      DISCHARGE MEDICATIONS:  New Prescriptions    CEPHALEXIN (KEFLEX) 500 MG CAPSULE    Take 1 capsule by mouth 2 times daily for 7 days       Moira Gamble MD  Emergency Medicine Resident    (Please note that portions of thisnote were completed with a voice recognition program.  Efforts were made to edit the dictations but occasionally words are mis-transcribed.)

## 2025-04-05 NOTE — DISCHARGE INSTRUCTIONS
You were seen in the ER today, a lance catheter was placed    PLEASE RETURN TO THE ED IMMEDIATELY for worsening symptoms, or if you develop any concerning symptoms such as: high fever not relieved by tylenol and/or motrin, chills, shortness of breath, chest pain, persistent nausea and/or vomiting, numbness, weakness or tingling in the arms or legs or change in color of the extremities, changes in mental status, persistent headache, blurry vision, inability to urinate, unable to follow up with your physician, or other any other  Care or concern.

## 2025-04-05 NOTE — ED NOTES
Writer called [facility] regarding the patient discharge status but with no answer. Writer left a message. Patient is already en route to the facility.

## 2025-04-23 ENCOUNTER — HOSPITAL ENCOUNTER (EMERGENCY)
Age: 85
Discharge: HOME | End: 2025-04-23
Attending: EMERGENCY MEDICINE
Payer: MEDICARE

## 2025-04-23 VITALS
SYSTOLIC BLOOD PRESSURE: 121 MMHG | OXYGEN SATURATION: 100 % | RESPIRATION RATE: 18 BRPM | HEART RATE: 71 BPM | TEMPERATURE: 97.9 F | DIASTOLIC BLOOD PRESSURE: 53 MMHG

## 2025-04-23 DIAGNOSIS — Z46.6 URINARY CATHETER CHANGE REQUIRED: Primary | ICD-10-CM

## 2025-04-23 LAB
BILIRUB UR QL STRIP: NEGATIVE
CLARITY UR: CLEAR
COLOR UR: YELLOW
COMMENT: ABNORMAL
GLUCOSE UR STRIP-MCNC: NEGATIVE MG/DL
HGB UR QL STRIP.AUTO: NEGATIVE
KETONES UR STRIP-MCNC: ABNORMAL MG/DL
LEUKOCYTE ESTERASE UR QL STRIP: NEGATIVE
NITRITE UR QL STRIP: NEGATIVE
PH UR STRIP: 5.5 [PH] (ref 5–8)
PROT UR STRIP-MCNC: NEGATIVE MG/DL
SP GR UR STRIP: 1.01 (ref 1–1.03)
UROBILINOGEN UR STRIP-ACNC: NORMAL EU/DL (ref 0–1)

## 2025-04-23 PROCEDURE — 99283 EMERGENCY DEPT VISIT LOW MDM: CPT

## 2025-04-23 PROCEDURE — 51798 US URINE CAPACITY MEASURE: CPT

## 2025-04-23 PROCEDURE — 81003 URINALYSIS AUTO W/O SCOPE: CPT

## 2025-04-23 PROCEDURE — 51702 INSERT TEMP BLADDER CATH: CPT

## 2025-04-23 NOTE — ED PROVIDER NOTES
eMERGENCY dEPARTMENT  Attending Physician Attestation     Pt Name: Ana Luisa Herzog  MRN: [id number]  Birthdate 1940  Date of evaluation: 4/23/25     Ana Luisa Herzog is a 84 y.o. female with CC: Urinary Retention (Pt from [place]. Pt has urinary retention and had indwelling catheter in. Pt pulled it out. Pt has dementia. Pt is here to have new on e placed. Pt has no complaints. )      Based on the medical record the care appears appropriate.  I was personally available for consultation in the Emergency Department.    Dougie Pierre DO  Attending Emergency Physician                  Dougie Pierre DO  04/23/25 1922

## 2025-04-23 NOTE — DISCHARGE INSTRUCTIONS
Please follow-up with your urogyn physician, family physician.  Recommend return to the ED if Croft catheter is not functioning properly, abdominal pain, nausea, vomiting, back pain, fevers or any other concerning symptoms.  Please do not pull out Croft catheter.

## 2025-04-23 NOTE — ED PROVIDER NOTES
EMERGENCY DEPARTMENT ENCOUNTER    Pt Name: Ana Luisa Herzog  MRN: [id number]  Birthdate 1940  Date of evaluation: 4/23/25  CHIEF COMPLAINT       Chief Complaint   Patient presents with    Urinary Retention     Pt from [place]. Pt has urinary retention and had indwelling catheter in. Pt pulled it out. Pt has dementia. Pt is here to have new on e placed. Pt has no complaints.      HISTORY OF PRESENT ILLNESS   Presents to the ED with aid for evaluation of lance catheter placement.  Pt has a hx of urinary retention and has an indwelling lance catheter.  Pt reportedly removed the lance catheter on 4/16.  She is going to a new facility tomorrow that requires it be replaced.  Pt is also supposed to have a suprapubic catheter placed in early May.  Pt does have dementia and has pulled out her lance catheter before.  Was seen in our ED on 4/4 for the same problem.     The history is provided by the patient and a caregiver. History limited by: dementia.           PASTMEDICAL HISTORY     Past Medical History:   Diagnosis Date    OLIVA (acute kidney injury) 07/08/2023    Anxiety and depression     Depression     GERD (gastroesophageal reflux disease)     Sleep apnea     Weight loss      Past Problem List  Patient Active Problem List   Diagnosis Code    Lesion of liver K76.9    Unintentional weight loss of more than 10% body weight within 6 months R63.4    OUSMANE (generalized anxiety disorder) F41.1    Complicated UTI (urinary tract infection) N39.0    Delirium R41.0    Abdominal pain R10.9    Rigidity R29.898    Colitis K52.9    Other retention of urine R33.8    Acquired renal cyst of right kidney N28.1    Dysuria R30.0    Urinary incontinence without sensory awareness N39.42    Septicemia (HCC) A41.9    Altered mental status R41.82    Hypokalemia E87.6    Parkinson's disease (HCC) G20.A1    Confusion R41.0    Severe protein-calorie malnutrition E43    Retention of urine R33.9    Frequency of micturition R35.0     SURGICAL HISTORY

## (undated) DEVICE — COVER,MAYO STAND,STERILE: Brand: MEDLINE

## (undated) DEVICE — SOLUTION PREP PAINT POV IOD FOR SKIN MUCOUS MEM

## (undated) DEVICE — SURGICAL PROCEDURE PACK LT EYE CUST ST CHARLES STRL LF DISP

## (undated) DEVICE — SHIELD EYE PLAS CATRCT PT CARE

## (undated) DEVICE — SOLUTION IRRIGATION BAL SALT SOLUTION 500 ML STRL BSS

## (undated) DEVICE — CANNULA OPHTH EYE HYDR DISECT

## (undated) DEVICE — PACK PROC FLD MGMT SYS CENTURION CUST

## (undated) DEVICE — TOWEL,OR,DSP,ST,WHITE,DLX,2/PK,40PK/CS: Brand: MEDLINE

## (undated) DEVICE — PREMIUM DRY TRAY LF: Brand: MEDLINE INDUSTRIES, INC.

## (undated) DEVICE — ALCON INTREPID 0.3MM POLYMER I/A COAXIAL CONICAL ANGLED: Brand: ALCON, INTREPID

## (undated) DEVICE — GOWN,AURORA,NONREINFORCED,LARGE: Brand: MEDLINE

## (undated) DEVICE — GLOVE SURG SZ 6 THK91MIL LTX FREE SYN POLYISOPRENE ANTI

## (undated) DEVICE — LABEL MED EYE STRL

## (undated) DEVICE — GAUZE,SPONGE,4"X4",16PLY,XRAY,STRL,LF: Brand: MEDLINE

## (undated) DEVICE — THE MONARCH® "D" CARTRIDGE IS A SINGLE-USE POLYPROPYLENE CARTRIDGE FOR POSTERIOR CHAMBER IOL DELIVERY: Brand: MONARCH® III

## (undated) DEVICE — CLEARCUT® SIDEPORT KNIFE DUAL BEVEL 1.0MM ANGLED: Brand: CLEARCUT®